# Patient Record
Sex: MALE | Race: WHITE | NOT HISPANIC OR LATINO | Employment: FULL TIME | URBAN - METROPOLITAN AREA
[De-identification: names, ages, dates, MRNs, and addresses within clinical notes are randomized per-mention and may not be internally consistent; named-entity substitution may affect disease eponyms.]

---

## 2019-04-25 ENCOUNTER — HOSPITAL ENCOUNTER (EMERGENCY)
Facility: CLINIC | Age: 54
Discharge: HOME | End: 2019-04-25
Attending: NURSE PRACTITIONER | Admitting: NURSE PRACTITIONER
Payer: COMMERCIAL

## 2019-04-25 VITALS
SYSTOLIC BLOOD PRESSURE: 154 MMHG | HEART RATE: 68 BPM | TEMPERATURE: 99.9 F | RESPIRATION RATE: 20 BRPM | OXYGEN SATURATION: 95 % | DIASTOLIC BLOOD PRESSURE: 82 MMHG

## 2019-04-25 DIAGNOSIS — J18.9 PNEUMONIA OF LEFT LOWER LOBE DUE TO INFECTIOUS ORGANISM: ICD-10-CM

## 2019-04-25 DIAGNOSIS — R11.0 NAUSEA: ICD-10-CM

## 2019-04-25 LAB
ANION GAP SERPL CALCULATED.3IONS-SCNC: 4 MMOL/L (ref 3–14)
BASOPHILS # BLD AUTO: 0 10E9/L (ref 0–0.2)
BASOPHILS NFR BLD AUTO: 0.2 %
BUN SERPL-MCNC: 18 MG/DL (ref 7–30)
CALCIUM SERPL-MCNC: 9.1 MG/DL (ref 8.5–10.1)
CHLORIDE SERPL-SCNC: 102 MMOL/L (ref 94–109)
CO2 BLDCOV-SCNC: 24 MMOL/L (ref 21–28)
CO2 SERPL-SCNC: 28 MMOL/L (ref 20–32)
CREAT SERPL-MCNC: 1.04 MG/DL (ref 0.66–1.25)
DIFFERENTIAL METHOD BLD: ABNORMAL
EOSINOPHIL # BLD AUTO: 0.1 10E9/L (ref 0–0.7)
EOSINOPHIL NFR BLD AUTO: 0.6 %
ERYTHROCYTE [DISTWIDTH] IN BLOOD BY AUTOMATED COUNT: 12.8 % (ref 10–15)
FLUAV+FLUBV AG SPEC QL: NEGATIVE
FLUAV+FLUBV AG SPEC QL: NEGATIVE
GFR SERPL CREATININE-BSD FRML MDRD: 82 ML/MIN/{1.73_M2}
GLUCOSE BLDC GLUCOMTR-MCNC: 124 MG/DL (ref 70–99)
GLUCOSE SERPL-MCNC: 125 MG/DL (ref 70–99)
HCT VFR BLD AUTO: 44.2 % (ref 40–53)
HGB BLD-MCNC: 15.1 G/DL (ref 13.3–17.7)
IMM GRANULOCYTES # BLD: 0 10E9/L (ref 0–0.4)
IMM GRANULOCYTES NFR BLD: 0.2 %
INTERPRETATION ECG - MUSE: NORMAL
LACTATE BLD-SCNC: 1.3 MMOL/L (ref 0.7–2.1)
LYMPHOCYTES # BLD AUTO: 1.2 10E9/L (ref 0.8–5.3)
LYMPHOCYTES NFR BLD AUTO: 9.9 %
MCH RBC QN AUTO: 31 PG (ref 26.5–33)
MCHC RBC AUTO-ENTMCNC: 34.2 G/DL (ref 31.5–36.5)
MCV RBC AUTO: 91 FL (ref 78–100)
MONOCYTES # BLD AUTO: 0.9 10E9/L (ref 0–1.3)
MONOCYTES NFR BLD AUTO: 7.2 %
NEUTROPHILS # BLD AUTO: 10.1 10E9/L (ref 1.6–8.3)
NEUTROPHILS NFR BLD AUTO: 81.9 %
NRBC # BLD AUTO: 0 10*3/UL
NRBC BLD AUTO-RTO: 0 /100
PCO2 BLDV: 40 MM HG (ref 40–50)
PH BLDV: 7.39 PH (ref 7.32–7.43)
PLATELET # BLD AUTO: 283 10E9/L (ref 150–450)
PO2 BLDV: 20 MM HG (ref 25–47)
POTASSIUM SERPL-SCNC: 4.7 MMOL/L (ref 3.4–5.3)
RBC # BLD AUTO: 4.87 10E12/L (ref 4.4–5.9)
SAO2 % BLDV FROM PO2: 32 %
SODIUM SERPL-SCNC: 134 MMOL/L (ref 133–144)
SPECIMEN SOURCE: NORMAL
TROPONIN I BLD-MCNC: 0.01 UG/L (ref 0–0.08)
WBC # BLD AUTO: 12.3 10E9/L (ref 4–11)

## 2019-04-25 PROCEDURE — 94640 AIRWAY INHALATION TREATMENT: CPT

## 2019-04-25 PROCEDURE — 87040 BLOOD CULTURE FOR BACTERIA: CPT | Performed by: EMERGENCY MEDICINE

## 2019-04-25 PROCEDURE — 85025 COMPLETE CBC W/AUTO DIFF WBC: CPT | Performed by: EMERGENCY MEDICINE

## 2019-04-25 PROCEDURE — 80048 BASIC METABOLIC PNL TOTAL CA: CPT | Performed by: EMERGENCY MEDICINE

## 2019-04-25 PROCEDURE — 25000132 ZZH RX MED GY IP 250 OP 250 PS 637: Performed by: NURSE PRACTITIONER

## 2019-04-25 PROCEDURE — 93005 ELECTROCARDIOGRAM TRACING: CPT

## 2019-04-25 PROCEDURE — 93005 ELECTROCARDIOGRAM TRACING: CPT | Mod: 76

## 2019-04-25 PROCEDURE — 25000125 ZZHC RX 250: Performed by: NURSE PRACTITIONER

## 2019-04-25 PROCEDURE — 82803 BLOOD GASES ANY COMBINATION: CPT

## 2019-04-25 PROCEDURE — 25000128 H RX IP 250 OP 636: Performed by: EMERGENCY MEDICINE

## 2019-04-25 PROCEDURE — 96361 HYDRATE IV INFUSION ADD-ON: CPT

## 2019-04-25 PROCEDURE — 00000146 ZZHCL STATISTIC GLUCOSE BY METER IP

## 2019-04-25 PROCEDURE — 25000128 H RX IP 250 OP 636: Performed by: NURSE PRACTITIONER

## 2019-04-25 PROCEDURE — 96376 TX/PRO/DX INJ SAME DRUG ADON: CPT

## 2019-04-25 PROCEDURE — 83605 ASSAY OF LACTIC ACID: CPT

## 2019-04-25 PROCEDURE — 99285 EMERGENCY DEPT VISIT HI MDM: CPT | Mod: 25

## 2019-04-25 PROCEDURE — 84484 ASSAY OF TROPONIN QUANT: CPT

## 2019-04-25 PROCEDURE — 96374 THER/PROPH/DIAG INJ IV PUSH: CPT

## 2019-04-25 PROCEDURE — 87804 INFLUENZA ASSAY W/OPTIC: CPT | Performed by: NURSE PRACTITIONER

## 2019-04-25 RX ORDER — ONDANSETRON 4 MG/1
4 TABLET, ORALLY DISINTEGRATING ORAL EVERY 8 HOURS PRN
Qty: 10 TABLET | Refills: 0 | Status: SHIPPED | OUTPATIENT
Start: 2019-04-25 | End: 2019-04-28

## 2019-04-25 RX ORDER — AZITHROMYCIN 250 MG/1
TABLET, FILM COATED ORAL
Qty: 6 TABLET | Refills: 0 | Status: SHIPPED | OUTPATIENT
Start: 2019-04-25 | End: 2024-05-25

## 2019-04-25 RX ORDER — PREDNISONE 20 MG/1
40 TABLET ORAL DAILY
Qty: 10 TABLET | Refills: 0 | Status: SHIPPED | OUTPATIENT
Start: 2019-04-25 | End: 2019-04-30

## 2019-04-25 RX ORDER — ALBUTEROL SULFATE 90 UG/1
2 AEROSOL, METERED RESPIRATORY (INHALATION) EVERY 4 HOURS PRN
Qty: 8 G | Refills: 0 | Status: SHIPPED | OUTPATIENT
Start: 2019-04-25 | End: 2024-05-25

## 2019-04-25 RX ORDER — AZITHROMYCIN 250 MG/1
500 TABLET, FILM COATED ORAL ONCE
Status: COMPLETED | OUTPATIENT
Start: 2019-04-25 | End: 2019-04-25

## 2019-04-25 RX ORDER — ONDANSETRON 2 MG/ML
4 INJECTION INTRAMUSCULAR; INTRAVENOUS ONCE
Status: COMPLETED | OUTPATIENT
Start: 2019-04-25 | End: 2019-04-25

## 2019-04-25 RX ORDER — IPRATROPIUM BROMIDE AND ALBUTEROL SULFATE 2.5; .5 MG/3ML; MG/3ML
3 SOLUTION RESPIRATORY (INHALATION) ONCE
Status: COMPLETED | OUTPATIENT
Start: 2019-04-25 | End: 2019-04-25

## 2019-04-25 RX ADMIN — IPRATROPIUM BROMIDE AND ALBUTEROL SULFATE 3 ML: .5; 3 SOLUTION RESPIRATORY (INHALATION) at 16:00

## 2019-04-25 RX ADMIN — IPRATROPIUM BROMIDE AND ALBUTEROL SULFATE 3 ML: .5; 3 SOLUTION RESPIRATORY (INHALATION) at 14:21

## 2019-04-25 RX ADMIN — ONDANSETRON 4 MG: 2 INJECTION INTRAMUSCULAR; INTRAVENOUS at 16:00

## 2019-04-25 RX ADMIN — SODIUM CHLORIDE 1000 ML: 9 INJECTION, SOLUTION INTRAVENOUS at 13:51

## 2019-04-25 RX ADMIN — ONDANSETRON 4 MG: 2 INJECTION INTRAMUSCULAR; INTRAVENOUS at 13:38

## 2019-04-25 RX ADMIN — AZITHROMYCIN 500 MG: 250 TABLET, FILM COATED ORAL at 14:21

## 2019-04-25 ASSESSMENT — ENCOUNTER SYMPTOMS
FEVER: 1
COUGH: 1
CONSTIPATION: 0
SHORTNESS OF BREATH: 1
DIARRHEA: 0
NAUSEA: 1
VOMITING: 1
ABDOMINAL PAIN: 1
DYSURIA: 0
WHEEZING: 0
STRIDOR: 0
FREQUENCY: 0

## 2019-04-25 NOTE — ED NOTES
"Pt c/o new \"heartburn\". NP notified. Repeat EKG obtained. Istat troponin run per provider's orders.   "

## 2019-04-25 NOTE — ED AVS SNAPSHOT
[facility] Emergency Department  [address]  [facility] [address]  Phone:  [phone]  Fax:  [phone]                                    Mark Milton Traaseth   MRN: [id number]    Department:  [facility] Emergency Department   Date of Visit:  4/25/2019           After Visit Summary Signature Page    I have received my discharge instructions, and my questions have been answered. I have discussed any challenges I see with this plan with the nurse or doctor.    ..........................................................................................................................................  Patient/Patient Representative Signature      ..........................................................................................................................................  Patient Representative Print Name and Relationship to Patient    ..................................................               ................................................  Date                                   Time    ..........................................................................................................................................  Reviewed by Signature/Title    ...................................................              ..............................................  Date                                               Time          22EPIC Rev 08/18

## 2019-04-25 NOTE — ED PROVIDER NOTES
History     Chief Complaint:  Shortness of Breath    HPI   Mark Milton Traaseth is a 54 year old male who presents with shortness of breath. The patient reports that he has had a cough and congestion for the past 4 days. This morning, he was driving to work when his symptoms acutely worsened. Thus, he presented to urgent care, where he had a measured fever of 102.6F, and had an episode of vomiting. He had a chest xray there, showing a possible bronchitis or left lower lobe pneumonia, as noted below. He was further referred to the ED for evaluation after he was given a dose of Tylenol and Zofran. Here in the ED, the patient ads he has no history of asthma. He does have some generalized abdominal pain, though denies any other symptoms or concerns.    XR Chest 2 views (4/25/19):   IMPRESSION:  1. Findings suspicious for left lower lobe bronchitis and/or infiltrate, as per radiology.     Allergies:  Codeine  Morphine    Medications:    The patient is currently on no regular medications.    Past Medical History:    The patient denies any significant past medical history.    Past Surgical History:    The patient does not have any pertinent past surgical history.    Family History:    No past pertinent family history.    Social History:  Negative for tobacco use.  Negative for alcohol use.  Negative for drug use.    Review of Systems   Constitutional: Positive for fever.   HENT: Positive for congestion.    Respiratory: Positive for cough and shortness of breath. Negative for wheezing and stridor.    Cardiovascular: Negative for chest pain.   Gastrointestinal: Positive for abdominal pain (generalized), nausea and vomiting. Negative for constipation and diarrhea.   Genitourinary: Negative for dysuria, frequency and urgency.   All other systems reviewed and are negative.      Physical Exam     Patient Vitals for the past 24 hrs:   BP Temp Pulse Heart Rate Resp SpO2   04/25/19 1530 154/82 -- 68 66 17 96 %   04/25/19 1515  156/73 -- 69 71 11 96 %   04/25/19 1500 160/86 -- 70 68 17 97 %   04/25/19 1445 158/83 -- 69 69 8 97 %   04/25/19 1430 155/80 -- 68 68 14 98 %   04/25/19 1415 156/79 -- 67 67 -- 96 %   04/25/19 1400 (!) 163/107 -- 69 68 17 93 %   04/25/19 1345 152/83 -- -- -- -- --   04/25/19 1318 (!) 140/92 99.9  F (37.7  C) 76 76 (!) 36 94 %     Physical Exam  General: Alert, Mild  discomfort, well kept  Eyes: PERRL, conjunctivae pink no scleral icterus or conjunctival injection  ENT:   Moist mucus membranes, posterior oropharynx clear without erythema or exudates, No lymphadenopathy, Normal voice  Resp:  Lungs clear to auscultation bilaterally, no crackles/rubs/wheezes. Tachypnea.  CV:  Normal rate and rhythm, no murmurs/rubs/gallops  GI:  Abdomen soft and non-distended.  Normoactive BS.  No tenderness, guarding or rebound, No masses  Skin:  Warm, dry.  No rashes or petechiae  Musculoskeletal: No peripheral edema or calf tenderness, Normal gross ROM   Neuro: Alert and oriented to person/place/time, normal sensation  Psychiatric: Normal affect, cooperative, good eye contact    Emergency Department Course   ECG:  Indication: Shortness of Breath  Time: 1350  Vent. Rate 72 bpm. VT interval 170. QRS duration 106. QT/QTc 398/435. P-R-T axis 41 -39 62.  Sinus rhythm with occasional PVCs. Left axis deviation. Abnormal ECG. Read time: 1350    Indication: Repeat  Time: 1615  Vent. Rate 70 bpm. VT interval 170. QRS duration 106. QT/QTc 414/447. P-R-T axis 40 -36 52.  Normal sinus rhythm. Left axis deviation. Minimal voltage criteria for LVH, may be normal variant. Abnormal ECG. Read time: 1619    Laboratory:  CBC: WBC: 12.3 (H), HGB: 15.1, PLT: 283  BMP: Glucose 125 (H), o/w WNL (Creatinine: 1.04)    1358 Glucose by meter: 124 (H)    1616 Troponin POCT: 0.01    Influenza A/B: negative    Venous Blood Gas  Lab 04/25/19  1404   PHV 7.39   PCO2V 40   Lactic Acid 1.3   PO2V 20*   HCO3V 24     Blood culture:  pending    Interventions:  1338 Zofran, 4 mg, IV injection  1351 NS 1L IV  1421 Azithromycin 500 mg PO   Duoneb 3 mL Nebulization   1600 Zofran, 4 mg, IV injection   Duoneb 3 mL Nebulization     Emergency Department Course:  Nursing notes and vitals reviewed. (1346) I performed an exam of the patient as documented above.     IV inserted. Medicine administered as documented above. Blood drawn. This was sent to the lab for further testing, results above.    EKG obtained in the ED, see results above.     (0350) I rechecked the patient and discussed the results of his workup thus far.     Findings and plan explained to the Patient. Patient discharged home with instructions regarding supportive care, medications, and reasons to return. The importance of close follow-up was reviewed. The patient was prescribed Azithromycin, albuterol, prednisone, and Zofran.    I personally reviewed the laboratory results with the Patient and answered all related questions prior to discharge.     Impression & Plan      Medical Decision Making:  Mark Milton Traaseth is a 54 year old male who presents today for evaluation of shortness of breath and upper respiratory symptoms.  He was seen at clinic prior to arrival where he had a chest x-ray that was concerning for bronchitis versus pneumonia.  Given that this is one-sided and patient has been febrile this most likely does not represent pneumonia.  His evaluation here shows a negative influenza, a negative troponin, nonacute EKG is, and minimally elevated white blood cell count.  Remainder of his laboratory studies are noncontributory.  He does appear to be improved and states that he feels somewhat improved after the above treatment.  He has not been hypoxic.  He does not require supplemental oxygen.  At this point there is no indication for further testing or imaging.  He is given his first dose of antibiotics here.  He was given a prescription for antibiotics and symptomatic medication  for at home.  At this point time he does appear to be safe and appropriate for outpatient management and follow-up and is discharged home.      Diagnosis:    ICD-10-CM    1. Pneumonia of left lower lobe due to infectious organism (H) J18.1 Blood culture ONE site   2. Nausea R11.0      Disposition:  discharged to home    Discharge Medications:     Medication List      Started    albuterol 108 (90 Base) MCG/ACT inhaler  Commonly known as:  PROAIR HFA  2 puffs, Inhalation, EVERY 4 HOURS PRN     azithromycin 250 MG tablet  Commonly known as:  ZITHROMAX  2 tabs day one, 1 tab days 2-5     ondansetron 4 MG ODT tab  Commonly known as:  ZOFRAN ODT  4 mg, Oral, EVERY 8 HOURS PRN     predniSONE 20 MG tablet  Commonly known as:  DELTASONE  40 mg, Oral, DAILY          Scribe Disclosure:  Winter SORIANO, am serving as a scribe on 4/25/2019 at 2:12 PM to personally document services performed by Jluis Haile APRN* based on my observations and the provider's statements to me.     Winter Ruth  4/25/2019   [facility] EMERGENCY DEPARTMENT       Jluis Haile APRN CNP  04/25/19 4775

## 2019-04-25 NOTE — ED TRIAGE NOTES
Pt presents with pneumonia from the clinic, pt states he began feeling ill today. Pt alert, oriented x3 ABCs intact

## 2019-04-26 LAB — INTERPRETATION ECG - MUSE: NORMAL

## 2019-05-01 LAB
BACTERIA SPEC CULT: NO GROWTH
Lab: NORMAL
SPECIMEN SOURCE: NORMAL

## 2021-12-08 ENCOUNTER — HOME INFUSION (PRE-WILLOW HOME INFUSION) (OUTPATIENT)
Dept: PHARMACY | Facility: CLINIC | Age: 56
End: 2021-12-08
Payer: COMMERCIAL

## 2021-12-10 NOTE — PROGRESS NOTES
This is a recent snapshot of the patient's [facility] medical record.  For current drug dose and complete information and questions, call [phone] or In Basket pool, fv home infusion ([id number])  CSN Number:  [id number]

## 2021-12-15 ENCOUNTER — HOME INFUSION (PRE-WILLOW HOME INFUSION) (OUTPATIENT)
Dept: PHARMACY | Facility: CLINIC | Age: 56
End: 2021-12-15
Payer: COMMERCIAL

## 2021-12-20 ENCOUNTER — HOME INFUSION (PRE-WILLOW HOME INFUSION) (OUTPATIENT)
Dept: PHARMACY | Facility: CLINIC | Age: 56
End: 2021-12-20
Payer: COMMERCIAL

## 2021-12-24 ENCOUNTER — HOME INFUSION (PRE-WILLOW HOME INFUSION) (OUTPATIENT)
Dept: PHARMACY | Facility: CLINIC | Age: 56
End: 2021-12-24
Payer: COMMERCIAL

## 2021-12-28 ENCOUNTER — HOME INFUSION (PRE-WILLOW HOME INFUSION) (OUTPATIENT)
Dept: PHARMACY | Facility: CLINIC | Age: 56
End: 2021-12-28
Payer: COMMERCIAL

## 2022-01-02 ENCOUNTER — HOME INFUSION (PRE-WILLOW HOME INFUSION) (OUTPATIENT)
Dept: PHARMACY | Facility: CLINIC | Age: 57
End: 2022-01-02
Payer: COMMERCIAL

## 2022-01-03 ENCOUNTER — HOME INFUSION (PRE-WILLOW HOME INFUSION) (OUTPATIENT)
Dept: PHARMACY | Facility: CLINIC | Age: 57
End: 2022-01-03
Payer: COMMERCIAL

## 2022-01-06 ENCOUNTER — HOME INFUSION (PRE-WILLOW HOME INFUSION) (OUTPATIENT)
Dept: PHARMACY | Facility: CLINIC | Age: 57
End: 2022-01-06
Payer: COMMERCIAL

## 2022-01-07 ENCOUNTER — HOME INFUSION (PRE-WILLOW HOME INFUSION) (OUTPATIENT)
Dept: PHARMACY | Facility: CLINIC | Age: 57
End: 2022-01-07
Payer: COMMERCIAL

## 2022-01-10 ENCOUNTER — HOME INFUSION (PRE-WILLOW HOME INFUSION) (OUTPATIENT)
Dept: PHARMACY | Facility: CLINIC | Age: 57
End: 2022-01-10
Payer: COMMERCIAL

## 2022-01-11 ENCOUNTER — HOME INFUSION (PRE-WILLOW HOME INFUSION) (OUTPATIENT)
Dept: PHARMACY | Facility: CLINIC | Age: 57
End: 2022-01-11
Payer: COMMERCIAL

## 2022-01-12 ENCOUNTER — HOME INFUSION (PRE-WILLOW HOME INFUSION) (OUTPATIENT)
Dept: PHARMACY | Facility: CLINIC | Age: 57
End: 2022-01-12
Payer: COMMERCIAL

## 2022-01-12 NOTE — PROGRESS NOTES
This is a recent snapshot of the patient's [facility] medical record.  For current drug dose and complete information and questions, call [phone] or In Basket pool, fv home infusion ([id number])  CSN Number:  [id number]

## 2022-02-10 NOTE — PROGRESS NOTES
This is a recent snapshot of the patient's [facility] medical record.  For current drug dose and complete information and questions, call [phone] or In Basket pool, fv home infusion ([id number])  CSN Number:  [id number]

## 2022-03-10 NOTE — PROGRESS NOTES
This is a recent snapshot of the patient's [facility] medical record.  For current drug dose and complete information and questions, call [phone] or In Basket pool, fv home infusion ([id number])  CSN Number:  [id number]

## 2022-03-22 NOTE — PROGRESS NOTES
This is a recent snapshot of the patient's [facility] medical record.  For current drug dose and complete information and questions, call [phone] or In Basket pool, fv home infusion ([id number])  CSN Number:  [id number]

## 2022-04-19 NOTE — PROGRESS NOTES
This is a recent snapshot of the patient's [facility] medical record.  For current drug dose and complete information and questions, call [phone] or In [facility] pool, fv home infusion ([id number])  CSN Number:  [id number]

## 2022-04-22 NOTE — PROGRESS NOTES
This is a recent snapshot of the patient's [facility] medical record.  For current drug dose and complete information and questions, call [phone] or In Basket pool, fv home infusion ([id number])  CSN Number:  [id number]

## 2022-04-29 NOTE — PROGRESS NOTES
This is a recent snapshot of the patient's [facility] medical record.  For current drug dose and complete information and questions, call [phone] or In Basket pool, fv home infusion ([phone])  CSN Number:  [id number]

## 2022-04-29 NOTE — PROGRESS NOTES
This is a recent snapshot of the patient's [facility] medical record.  For current drug dose and complete information and questions, call [phone] or In Basket pool, fv home infusion ([id number])  CSN Number:  [id number]

## 2022-05-05 NOTE — PROGRESS NOTES
This is a recent snapshot of the patient's [facility] medical record.  For current drug dose and complete information and questions, call [phone] or In Basket pool, fv home infusion ([id number])  CSN Number:  [id number]

## 2022-05-11 NOTE — PROGRESS NOTES
This is a recent snapshot of the patient's [facility] medical record.  For current drug dose and complete information and questions, call [phone] or In [facility] pool, fv home infusion ([phone])  CSN Number:  [id number]

## 2022-06-16 NOTE — PROGRESS NOTES
This is a recent snapshot of the patient's [facility] medical record.  For current drug dose and complete information and questions, call [phone] or In Basket pool, fv home infusion ([id number])  CSN Number:  [id number]

## 2022-06-21 NOTE — PROGRESS NOTES
This is a recent snapshot of the patient's [facility] medical record.  For current drug dose and complete information and questions, call [phone] or In Basket pool, fv home infusion ([id number])  CSN Number:  [id number]

## 2022-12-13 ENCOUNTER — HOME INFUSION (PRE-WILLOW HOME INFUSION) (OUTPATIENT)
Dept: PHARMACY | Facility: CLINIC | Age: 57
End: 2022-12-13
Payer: COMMERCIAL

## 2024-05-25 ENCOUNTER — HOSPITAL ENCOUNTER (INPATIENT)
Facility: CLINIC | Age: 59
LOS: 3 days | Discharge: HOME | DRG: 552 | End: 2024-05-30
Attending: EMERGENCY MEDICINE | Admitting: EMERGENCY MEDICINE
Payer: COMMERCIAL

## 2024-05-25 ENCOUNTER — APPOINTMENT (OUTPATIENT)
Dept: MRI IMAGING | Facility: CLINIC | Age: 59
DRG: 552 | End: 2024-05-25
Attending: EMERGENCY MEDICINE
Payer: COMMERCIAL

## 2024-05-25 DIAGNOSIS — N40.1 BENIGN PROSTATIC HYPERPLASIA WITH URINARY RETENTION: ICD-10-CM

## 2024-05-25 DIAGNOSIS — R33.8 BENIGN PROSTATIC HYPERPLASIA WITH URINARY RETENTION: ICD-10-CM

## 2024-05-25 DIAGNOSIS — E11.69 TYPE 2 DIABETES MELLITUS WITH OTHER SPECIFIED COMPLICATION, WITHOUT LONG-TERM CURRENT USE OF INSULIN (H): ICD-10-CM

## 2024-05-25 DIAGNOSIS — I10 BENIGN ESSENTIAL HYPERTENSION: Primary | ICD-10-CM

## 2024-05-25 DIAGNOSIS — M54.59 INTRACTABLE LOW BACK PAIN: ICD-10-CM

## 2024-05-25 LAB
ALBUMIN UR-MCNC: 70 MG/DL
ANION GAP SERPL CALCULATED.3IONS-SCNC: 10 MMOL/L (ref 7–15)
APPEARANCE UR: CLEAR
BASOPHILS # BLD AUTO: 0 10E3/UL (ref 0–0.2)
BASOPHILS NFR BLD AUTO: 0 %
BILIRUB UR QL STRIP: NEGATIVE
BUN SERPL-MCNC: 23.7 MG/DL (ref 6–20)
CALCIUM SERPL-MCNC: 9.2 MG/DL (ref 8.6–10)
CHLORIDE SERPL-SCNC: 106 MMOL/L (ref 98–107)
COLOR UR AUTO: ABNORMAL
CREAT SERPL-MCNC: 1.4 MG/DL (ref 0.67–1.17)
DEPRECATED HCO3 PLAS-SCNC: 23 MMOL/L (ref 22–29)
EGFRCR SERPLBLD CKD-EPI 2021: 59 ML/MIN/1.73M2
EOSINOPHIL # BLD AUTO: 0.2 10E3/UL (ref 0–0.7)
EOSINOPHIL NFR BLD AUTO: 2 %
ERYTHROCYTE [DISTWIDTH] IN BLOOD BY AUTOMATED COUNT: 14.3 % (ref 10–15)
GLUCOSE BLDC GLUCOMTR-MCNC: 205 MG/DL (ref 70–99)
GLUCOSE BLDC GLUCOMTR-MCNC: 215 MG/DL (ref 70–99)
GLUCOSE SERPL-MCNC: 182 MG/DL (ref 70–99)
GLUCOSE UR STRIP-MCNC: NEGATIVE MG/DL
HBA1C MFR BLD: 7.1 %
HCT VFR BLD AUTO: 43.8 % (ref 40–53)
HGB BLD-MCNC: 14.6 G/DL (ref 13.3–17.7)
HGB UR QL STRIP: NEGATIVE
IMM GRANULOCYTES # BLD: 0 10E3/UL
IMM GRANULOCYTES NFR BLD: 0 %
KETONES UR STRIP-MCNC: NEGATIVE MG/DL
LEUKOCYTE ESTERASE UR QL STRIP: NEGATIVE
LYMPHOCYTES # BLD AUTO: 2.1 10E3/UL (ref 0.8–5.3)
LYMPHOCYTES NFR BLD AUTO: 24 %
MCH RBC QN AUTO: 29 PG (ref 26.5–33)
MCHC RBC AUTO-ENTMCNC: 33.3 G/DL (ref 31.5–36.5)
MCV RBC AUTO: 87 FL (ref 78–100)
MONOCYTES # BLD AUTO: 0.6 10E3/UL (ref 0–1.3)
MONOCYTES NFR BLD AUTO: 7 %
NEUTROPHILS # BLD AUTO: 5.7 10E3/UL (ref 1.6–8.3)
NEUTROPHILS NFR BLD AUTO: 66 %
NITRATE UR QL: NEGATIVE
NRBC # BLD AUTO: 0 10E3/UL
NRBC BLD AUTO-RTO: 0 /100
PH UR STRIP: 7.5 [PH] (ref 5–7)
PLATELET # BLD AUTO: 349 10E3/UL (ref 150–450)
POTASSIUM SERPL-SCNC: 5.1 MMOL/L (ref 3.4–5.3)
RBC # BLD AUTO: 5.03 10E6/UL (ref 4.4–5.9)
RBC URINE: 1 /HPF
SODIUM SERPL-SCNC: 139 MMOL/L (ref 135–145)
SP GR UR STRIP: 1.01 (ref 1–1.03)
SQUAMOUS EPITHELIAL: 1 /HPF
UROBILINOGEN UR STRIP-MCNC: <2 MG/DL
WBC # BLD AUTO: 8.5 10E3/UL (ref 4–11)
WBC URINE: 1 /HPF

## 2024-05-25 PROCEDURE — 81001 URINALYSIS AUTO W/SCOPE: CPT | Performed by: EMERGENCY MEDICINE

## 2024-05-25 PROCEDURE — 99285 EMERGENCY DEPT VISIT HI MDM: CPT | Mod: 25

## 2024-05-25 PROCEDURE — 96376 TX/PRO/DX INJ SAME DRUG ADON: CPT

## 2024-05-25 PROCEDURE — 250N000012 HC RX MED GY IP 250 OP 636 PS 637: Performed by: EMERGENCY MEDICINE

## 2024-05-25 PROCEDURE — 250N000011 HC RX IP 250 OP 636: Performed by: EMERGENCY MEDICINE

## 2024-05-25 PROCEDURE — G0378 HOSPITAL OBSERVATION PER HR: HCPCS

## 2024-05-25 PROCEDURE — 72148 MRI LUMBAR SPINE W/O DYE: CPT

## 2024-05-25 PROCEDURE — 250N000013 HC RX MED GY IP 250 OP 250 PS 637: Performed by: INTERNAL MEDICINE

## 2024-05-25 PROCEDURE — 99207 PR NO BILLABLE SERVICE THIS VISIT: CPT | Performed by: EMERGENCY MEDICINE

## 2024-05-25 PROCEDURE — 250N000013 HC RX MED GY IP 250 OP 250 PS 637: Performed by: EMERGENCY MEDICINE

## 2024-05-25 PROCEDURE — 83036 HEMOGLOBIN GLYCOSYLATED A1C: CPT | Performed by: EMERGENCY MEDICINE

## 2024-05-25 PROCEDURE — 82962 GLUCOSE BLOOD TEST: CPT

## 2024-05-25 PROCEDURE — 85025 COMPLETE CBC W/AUTO DIFF WBC: CPT | Performed by: EMERGENCY MEDICINE

## 2024-05-25 PROCEDURE — 96375 TX/PRO/DX INJ NEW DRUG ADDON: CPT

## 2024-05-25 PROCEDURE — 80048 BASIC METABOLIC PNL TOTAL CA: CPT | Performed by: EMERGENCY MEDICINE

## 2024-05-25 PROCEDURE — 250N000012 HC RX MED GY IP 250 OP 636 PS 637: Performed by: INTERNAL MEDICINE

## 2024-05-25 PROCEDURE — 99222 1ST HOSP IP/OBS MODERATE 55: CPT | Performed by: EMERGENCY MEDICINE

## 2024-05-25 PROCEDURE — 96374 THER/PROPH/DIAG INJ IV PUSH: CPT

## 2024-05-25 PROCEDURE — 36415 COLL VENOUS BLD VENIPUNCTURE: CPT | Performed by: EMERGENCY MEDICINE

## 2024-05-25 RX ORDER — LOSARTAN POTASSIUM 100 MG/1
100 TABLET ORAL DAILY
Status: ON HOLD | COMMUNITY
End: 2024-05-30

## 2024-05-25 RX ORDER — HYDROXYZINE HYDROCHLORIDE 10 MG/1
10-20 TABLET, FILM COATED ORAL 2 TIMES DAILY PRN
Status: ON HOLD | COMMUNITY
End: 2024-07-11

## 2024-05-25 RX ORDER — HYDROXYZINE HYDROCHLORIDE 25 MG/1
25 TABLET, FILM COATED ORAL EVERY 4 HOURS PRN
Status: DISCONTINUED | OUTPATIENT
Start: 2024-05-25 | End: 2024-05-30 | Stop reason: HOSPADM

## 2024-05-25 RX ORDER — AMPICILLIN TRIHYDRATE 250 MG
600 CAPSULE ORAL 2 TIMES DAILY
COMMUNITY

## 2024-05-25 RX ORDER — ASPIRIN 81 MG/1
81 TABLET ORAL DAILY
Status: DISCONTINUED | OUTPATIENT
Start: 2024-05-26 | End: 2024-05-30 | Stop reason: HOSPADM

## 2024-05-25 RX ORDER — BUPROPION HYDROCHLORIDE 300 MG/1
300 TABLET ORAL EVERY MORNING
COMMUNITY

## 2024-05-25 RX ORDER — DEXTROSE MONOHYDRATE 25 G/50ML
25-50 INJECTION, SOLUTION INTRAVENOUS
Status: DISCONTINUED | OUTPATIENT
Start: 2024-05-25 | End: 2024-05-30 | Stop reason: HOSPADM

## 2024-05-25 RX ORDER — AMOXICILLIN 250 MG
1 CAPSULE ORAL 2 TIMES DAILY PRN
Status: DISCONTINUED | OUTPATIENT
Start: 2024-05-25 | End: 2024-05-30 | Stop reason: HOSPADM

## 2024-05-25 RX ORDER — LEVOTHYROXINE SODIUM 50 UG/1
50 TABLET ORAL DAILY
COMMUNITY

## 2024-05-25 RX ORDER — ONDANSETRON 2 MG/ML
4 INJECTION INTRAMUSCULAR; INTRAVENOUS EVERY 6 HOURS PRN
Status: DISCONTINUED | OUTPATIENT
Start: 2024-05-25 | End: 2024-05-30 | Stop reason: HOSPADM

## 2024-05-25 RX ORDER — NICOTINE POLACRILEX 4 MG
15-30 LOZENGE BUCCAL
Status: DISCONTINUED | OUTPATIENT
Start: 2024-05-25 | End: 2024-05-30 | Stop reason: HOSPADM

## 2024-05-25 RX ORDER — DIAZEPAM 10 MG/2ML
2.5 INJECTION, SOLUTION INTRAMUSCULAR; INTRAVENOUS 3 TIMES DAILY
Status: DISCONTINUED | OUTPATIENT
Start: 2024-05-25 | End: 2024-05-26

## 2024-05-25 RX ORDER — DIAZEPAM 10 MG/2ML
2.5 INJECTION, SOLUTION INTRAMUSCULAR; INTRAVENOUS ONCE
Status: COMPLETED | OUTPATIENT
Start: 2024-05-25 | End: 2024-05-25

## 2024-05-25 RX ORDER — HYDROMORPHONE HYDROCHLORIDE 1 MG/ML
0.5 INJECTION, SOLUTION INTRAMUSCULAR; INTRAVENOUS; SUBCUTANEOUS ONCE
Status: COMPLETED | OUTPATIENT
Start: 2024-05-25 | End: 2024-05-25

## 2024-05-25 RX ORDER — NALOXONE HYDROCHLORIDE 0.4 MG/ML
0.2 INJECTION, SOLUTION INTRAMUSCULAR; INTRAVENOUS; SUBCUTANEOUS
Status: DISCONTINUED | OUTPATIENT
Start: 2024-05-25 | End: 2024-05-30 | Stop reason: HOSPADM

## 2024-05-25 RX ORDER — ALLOPURINOL 100 MG/1
100 TABLET ORAL DAILY PRN
COMMUNITY

## 2024-05-25 RX ORDER — HYDRALAZINE HYDROCHLORIDE 100 MG/1
100 TABLET, FILM COATED ORAL 2 TIMES DAILY
Status: ON HOLD | COMMUNITY
End: 2024-05-30

## 2024-05-25 RX ORDER — ACETAMINOPHEN 325 MG/1
975 TABLET ORAL 3 TIMES DAILY
Status: DISCONTINUED | OUTPATIENT
Start: 2024-05-25 | End: 2024-05-27

## 2024-05-25 RX ORDER — VALACYCLOVIR HYDROCHLORIDE 1 G/1
1000 TABLET, FILM COATED ORAL 2 TIMES DAILY PRN
COMMUNITY

## 2024-05-25 RX ORDER — NITROGLYCERIN 0.4 MG/1
0.4 TABLET SUBLINGUAL EVERY 5 MIN PRN
COMMUNITY

## 2024-05-25 RX ORDER — NALOXONE HYDROCHLORIDE 0.4 MG/ML
0.4 INJECTION, SOLUTION INTRAMUSCULAR; INTRAVENOUS; SUBCUTANEOUS
Status: DISCONTINUED | OUTPATIENT
Start: 2024-05-25 | End: 2024-05-30 | Stop reason: HOSPADM

## 2024-05-25 RX ORDER — ASPIRIN 81 MG/1
81 TABLET ORAL DAILY
Status: ON HOLD | COMMUNITY
End: 2024-07-11

## 2024-05-25 RX ORDER — TORSEMIDE 20 MG/1
40 TABLET ORAL DAILY
COMMUNITY

## 2024-05-25 RX ORDER — SILDENAFIL CITRATE 20 MG/1
100 TABLET ORAL DAILY PRN
COMMUNITY

## 2024-05-25 RX ORDER — ONDANSETRON 4 MG/1
4 TABLET, ORALLY DISINTEGRATING ORAL EVERY 6 HOURS PRN
Status: DISCONTINUED | OUTPATIENT
Start: 2024-05-25 | End: 2024-05-30 | Stop reason: HOSPADM

## 2024-05-25 RX ORDER — ALLOPURINOL 100 MG/1
100 TABLET ORAL DAILY
Status: DISCONTINUED | OUTPATIENT
Start: 2024-05-25 | End: 2024-05-30 | Stop reason: HOSPADM

## 2024-05-25 RX ORDER — INSULIN LISPRO 100 [IU]/ML
0-12 INJECTION, SOLUTION INTRAVENOUS; SUBCUTANEOUS
COMMUNITY

## 2024-05-25 RX ORDER — PYRIDOXINE HCL (VITAMIN B6) 50 MG
50 TABLET ORAL DAILY
COMMUNITY

## 2024-05-25 RX ORDER — AMLODIPINE BESYLATE 10 MG/1
10 TABLET ORAL DAILY
Status: DISCONTINUED | OUTPATIENT
Start: 2024-05-26 | End: 2024-05-30 | Stop reason: HOSPADM

## 2024-05-25 RX ORDER — CARVEDILOL 25 MG/1
25 TABLET ORAL 2 TIMES DAILY WITH MEALS
Status: ON HOLD | COMMUNITY
End: 2024-05-30

## 2024-05-25 RX ORDER — ONDANSETRON 2 MG/ML
4 INJECTION INTRAMUSCULAR; INTRAVENOUS ONCE
Status: COMPLETED | OUTPATIENT
Start: 2024-05-25 | End: 2024-05-25

## 2024-05-25 RX ORDER — PANTOPRAZOLE SODIUM 40 MG/1
40 TABLET, DELAYED RELEASE ORAL DAILY
Status: DISCONTINUED | OUTPATIENT
Start: 2024-05-26 | End: 2024-05-30 | Stop reason: HOSPADM

## 2024-05-25 RX ORDER — HYDROMORPHONE HYDROCHLORIDE 1 MG/ML
.5-1 INJECTION, SOLUTION INTRAMUSCULAR; INTRAVENOUS; SUBCUTANEOUS
Status: DISCONTINUED | OUTPATIENT
Start: 2024-05-25 | End: 2024-05-28 | Stop reason: ALTCHOICE

## 2024-05-25 RX ORDER — DIPHENHYDRAMINE HCL 25 MG
25 CAPSULE ORAL EVERY 6 HOURS PRN
Status: DISCONTINUED | OUTPATIENT
Start: 2024-05-25 | End: 2024-05-30 | Stop reason: HOSPADM

## 2024-05-25 RX ORDER — DIPHENHYDRAMINE HYDROCHLORIDE 50 MG/ML
25 INJECTION INTRAMUSCULAR; INTRAVENOUS EVERY 6 HOURS PRN
Status: DISCONTINUED | OUTPATIENT
Start: 2024-05-25 | End: 2024-05-30 | Stop reason: HOSPADM

## 2024-05-25 RX ORDER — AMLODIPINE BESYLATE 10 MG/1
10 TABLET ORAL DAILY
COMMUNITY

## 2024-05-25 RX ORDER — DEXAMETHASONE SODIUM PHOSPHATE 4 MG/ML
4 INJECTION, SOLUTION INTRA-ARTICULAR; INTRALESIONAL; INTRAMUSCULAR; INTRAVENOUS; SOFT TISSUE EVERY 6 HOURS
Status: DISCONTINUED | OUTPATIENT
Start: 2024-05-25 | End: 2024-05-28

## 2024-05-25 RX ORDER — AMOXICILLIN 250 MG
2 CAPSULE ORAL 2 TIMES DAILY PRN
Status: DISCONTINUED | OUTPATIENT
Start: 2024-05-25 | End: 2024-05-30 | Stop reason: HOSPADM

## 2024-05-25 RX ORDER — DEXAMETHASONE SODIUM PHOSPHATE 10 MG/ML
8 INJECTION, SOLUTION INTRAMUSCULAR; INTRAVENOUS ONCE
Status: COMPLETED | OUTPATIENT
Start: 2024-05-25 | End: 2024-05-25

## 2024-05-25 RX ORDER — BUPROPION HYDROCHLORIDE 150 MG/1
300 TABLET ORAL EVERY MORNING
Status: DISCONTINUED | OUTPATIENT
Start: 2024-05-26 | End: 2024-05-30 | Stop reason: HOSPADM

## 2024-05-25 RX ORDER — CARVEDILOL 25 MG/1
25 TABLET ORAL DAILY
Status: DISCONTINUED | OUTPATIENT
Start: 2024-05-26 | End: 2024-05-26

## 2024-05-25 RX ORDER — LEVOTHYROXINE SODIUM 50 UG/1
50 TABLET ORAL DAILY
Status: DISCONTINUED | OUTPATIENT
Start: 2024-05-26 | End: 2024-05-30 | Stop reason: HOSPADM

## 2024-05-25 RX ORDER — HYDRALAZINE HYDROCHLORIDE 50 MG/1
100 TABLET, FILM COATED ORAL 2 TIMES DAILY
Status: DISCONTINUED | OUTPATIENT
Start: 2024-05-25 | End: 2024-05-27

## 2024-05-25 RX ORDER — LOSARTAN POTASSIUM 50 MG/1
50 TABLET ORAL DAILY
Status: DISCONTINUED | OUTPATIENT
Start: 2024-05-26 | End: 2024-05-30 | Stop reason: HOSPADM

## 2024-05-25 RX ORDER — CARVEDILOL 25 MG/1
25 TABLET ORAL 2 TIMES DAILY WITH MEALS
Status: DISCONTINUED | OUTPATIENT
Start: 2024-05-26 | End: 2024-05-25

## 2024-05-25 RX ADMIN — ALLOPURINOL 100 MG: 100 TABLET ORAL at 14:54

## 2024-05-25 RX ADMIN — DIAZEPAM 2.5 MG: 10 INJECTION, SOLUTION INTRAMUSCULAR; INTRAVENOUS at 13:39

## 2024-05-25 RX ADMIN — INSULIN GLARGINE 100 UNITS: 100 INJECTION, SOLUTION SUBCUTANEOUS at 23:48

## 2024-05-25 RX ADMIN — DIAZEPAM 2.5 MG: 5 INJECTION INTRAMUSCULAR; INTRAVENOUS at 09:17

## 2024-05-25 RX ADMIN — SENNOSIDES AND DOCUSATE SODIUM 1 TABLET: 50; 8.6 TABLET ORAL at 14:54

## 2024-05-25 RX ADMIN — ACETAMINOPHEN 975 MG: 325 TABLET ORAL at 13:40

## 2024-05-25 RX ADMIN — HYDROMORPHONE HYDROCHLORIDE 1 MG: 1 INJECTION, SOLUTION INTRAMUSCULAR; INTRAVENOUS; SUBCUTANEOUS at 09:15

## 2024-05-25 RX ADMIN — HYDROMORPHONE HYDROCHLORIDE 1 MG: 1 INJECTION, SOLUTION INTRAMUSCULAR; INTRAVENOUS; SUBCUTANEOUS at 10:51

## 2024-05-25 RX ADMIN — DEXAMETHASONE SODIUM PHOSPHATE 4 MG: 4 INJECTION, SOLUTION INTRAMUSCULAR; INTRAVENOUS at 23:48

## 2024-05-25 RX ADMIN — HYDROMORPHONE HYDROCHLORIDE 0.5 MG: 1 INJECTION, SOLUTION INTRAMUSCULAR; INTRAVENOUS; SUBCUTANEOUS at 13:39

## 2024-05-25 RX ADMIN — HYDROMORPHONE HYDROCHLORIDE 1 MG: 1 INJECTION, SOLUTION INTRAMUSCULAR; INTRAVENOUS; SUBCUTANEOUS at 15:04

## 2024-05-25 RX ADMIN — HYDRALAZINE HYDROCHLORIDE 100 MG: 50 TABLET, FILM COATED ORAL at 23:48

## 2024-05-25 RX ADMIN — INSULIN ASPART 2 UNITS: 100 INJECTION, SOLUTION INTRAVENOUS; SUBCUTANEOUS at 16:25

## 2024-05-25 RX ADMIN — DIPHENHYDRAMINE HYDROCHLORIDE 25 MG: 25 CAPSULE ORAL at 14:54

## 2024-05-25 RX ADMIN — ACETAMINOPHEN 975 MG: 325 TABLET ORAL at 20:24

## 2024-05-25 RX ADMIN — DIAZEPAM 2.5 MG: 10 INJECTION, SOLUTION INTRAMUSCULAR; INTRAVENOUS at 20:24

## 2024-05-25 RX ADMIN — DIPHENHYDRAMINE HYDROCHLORIDE 25 MG: 25 CAPSULE ORAL at 20:42

## 2024-05-25 RX ADMIN — HYDROMORPHONE HYDROCHLORIDE 1 MG: 1 INJECTION, SOLUTION INTRAMUSCULAR; INTRAVENOUS; SUBCUTANEOUS at 22:14

## 2024-05-25 RX ADMIN — DEXAMETHASONE SODIUM PHOSPHATE 4 MG: 4 INJECTION, SOLUTION INTRAMUSCULAR; INTRAVENOUS at 17:06

## 2024-05-25 RX ADMIN — DEXAMETHASONE SODIUM PHOSPHATE 8 MG: 10 INJECTION INTRAMUSCULAR; INTRAVENOUS at 12:11

## 2024-05-25 RX ADMIN — HYDROMORPHONE HYDROCHLORIDE 1 MG: 1 INJECTION, SOLUTION INTRAMUSCULAR; INTRAVENOUS; SUBCUTANEOUS at 19:10

## 2024-05-25 RX ADMIN — HYDROXYZINE HYDROCHLORIDE 25 MG: 25 TABLET ORAL at 17:51

## 2024-05-25 RX ADMIN — HYDROMORPHONE HYDROCHLORIDE 1 MG: 1 INJECTION, SOLUTION INTRAMUSCULAR; INTRAVENOUS; SUBCUTANEOUS at 17:06

## 2024-05-25 RX ADMIN — ONDANSETRON 4 MG: 2 INJECTION INTRAMUSCULAR; INTRAVENOUS at 09:12

## 2024-05-25 ASSESSMENT — ACTIVITIES OF DAILY LIVING (ADL)
ADLS_ACUITY_SCORE: 36
ADLS_ACUITY_SCORE: 37
ADLS_ACUITY_SCORE: 36
ADLS_ACUITY_SCORE: 37
ADLS_ACUITY_SCORE: 36
ADLS_ACUITY_SCORE: 35
ADLS_ACUITY_SCORE: 35
ADLS_ACUITY_SCORE: 36
ADLS_ACUITY_SCORE: 36
ADLS_ACUITY_SCORE: 35
ADLS_ACUITY_SCORE: 36
ADLS_ACUITY_SCORE: 36
ADLS_ACUITY_SCORE: 35

## 2024-05-25 ASSESSMENT — COLUMBIA-SUICIDE SEVERITY RATING SCALE - C-SSRS
6. HAVE YOU EVER DONE ANYTHING, STARTED TO DO ANYTHING, OR PREPARED TO DO ANYTHING TO END YOUR LIFE?: NO
2. HAVE YOU ACTUALLY HAD ANY THOUGHTS OF KILLING YOURSELF IN THE PAST MONTH?: NO
1. IN THE PAST MONTH, HAVE YOU WISHED YOU WERE DEAD OR WISHED YOU COULD GO TO SLEEP AND NOT WAKE UP?: NO

## 2024-05-25 NOTE — ED TRIAGE NOTES
"Pt presents to the ER via EMS from private home. Pt here with complaints of low back pain, L hip and leg pain. Pt unable to straighten his L leg. States feels like \"it might pop\". Pt unable to walk. Denies any trauma. Worsening numbness down L leg. Hx of L4 discectomy. Report similar pain to when that happened. EMS gave 100 mcg of Fentanyl with minimal relief.     Triage Assessment (Adult)       Row Name 05/25/24 0834          Triage Assessment    Airway WDL WDL        Respiratory WDL    Respiratory WDL WDL        Skin Circulation/Temperature WDL    Skin Circulation/Temperature WDL WDL        Cardiac WDL    Cardiac WDL WDL        Peripheral/Neurovascular WDL    Peripheral Neurovascular WDL WDL        Cognitive/Neuro/Behavioral WDL    Cognitive/Neuro/Behavioral WDL WDL                     "

## 2024-05-25 NOTE — ED PROVIDER NOTES
EMERGENCY DEPARTMENT ENCOUNTER      NAME: Mark Milton Traaseth  AGE: 59 year old male  YOB: 1965  MRN: [id number]  EVALUATION DATE & TIME: 2024  8:27 AM    PCP: True Nogueira    ED PROVIDER: Kevan Vivar M.D.      Chief Complaint   Patient presents with    Back Pain         FINAL IMPRESSION:  1.  Acute lumbar spine pain.  2.  Acute urinary retention.  3.  Intractable pain.  4.  Inability to stand or ambulate.    ED COURSE & MEDICAL DECISION MAKIN:50 AM.  I met with the patient to gather history and to perform my initial exam. We discussed plans for the ED course, including diagnostic testing and treatment. PPE worn: cloth mask.  Patient status post L4 discectomy a number of years ago.  Notes onset of pain since yesterday in the low back with primarily going down the left leg and some posterior right leg.  Does not feel like he is emptying his bladder appropriately.  Denies any other neurological deficits.  9:19 AM.  Bladder scan showed 882 cc.  Patient voided approximately 100 mL of urine.  Postvoid residual 776.  A Springer catheter was ordered.  11:10 AM Rechecked and updated the patient on lab and imaging results. Discussed further plan of care.  Pain improved,.  Unfortunately patient notes continued inability to stand or ambulate secondary to pain.  Plan to admit to [facility] inpatient for pain control.  [facility]less[facility] page admitting service.  Patient in agreement with this plan.  11:50 AM.  Hospitalist called back and agrees with the [facility] observation admission.  Decadron ordered.    Pertinent Labs & Imaging studies reviewed. (See chart for details)  59 year old male presents to the Emergency Department for evaluation of low back pain.    At the conclusion of the encounter I discussed the results of all of the tests and the disposition. The questions were answered. The patient or family acknowledged understanding and was agreeable with the care plan.              Medical Decision  Making  Obtained supplemental history:Supplemental history obtained?: No  Reviewed external records: External records reviewed?: Outpatient Record:  Both inpatient and outpatient computer records were reviewed  Care impacted by chronic illness:Diabetes and Hypertension  Care significantly affected by social determinants of health:N/A  Did you consider but not order tests?: Work up considered but not performed and documented in chart, if applicable  Did you interpret images independently?: Independent interpretation of ECG and images noted in documentation, when applicable.  Consultation discussion with other provider:Did you involve another provider (consultant, , pharmacy, etc.)?: I discussed the care with another health care provider, see documentation for details.  Admit.      MEDICATIONS GIVEN IN THE EMERGENCY:  Medications   diazepam (VALIUM) injection 2.5 mg (2.5 mg Intravenous $Given 5/25/24 0917)   ondansetron (ZOFRAN) injection 4 mg (4 mg Intravenous $Given 5/25/24 0912)   HYDROmorphone (DILAUDID) injection 1 mg (1 mg Intravenous $Given 5/25/24 1051)       NEW PRESCRIPTIONS STARTED AT TODAY'S ER VISIT  New Prescriptions    No medications on file          =================================================================    HPI    Patient information was obtained from: Patient     Use of : N/A      Mark Milton Traaseth is a 59 year old male with a pertinent history of T2DM, ht, and chronic kidney disease stage 2 presents to this ED  for evaluation of low back pain. Patient underwent L4 discectomy 20 years ago.  Patient notes onset yesterday of low back pain primarily going down the left leg.  Does not feel like he is emptying the bladder completely.    He does not identify any waxing or waning symptoms otherwise, exacerbating or alleviating features, associated symptoms except as mentioned. He denies any pain related complaints.    REVIEW OF SYSTEMS   Review of Systems patient complaining of low  back pain and neurological symptoms as discussed.    PAST MEDICAL HISTORY:  History reviewed. No pertinent past medical history.    PAST SURGICAL HISTORY:  History reviewed. No pertinent surgical history.        CURRENT MEDICATIONS:    albuterol (PROAIR HFA) 108 (90 Base) MCG/ACT inhaler  azithromycin (ZITHROMAX) 250 MG tablet        ALLERGIES:  Allergies   Allergen Reactions    Codeine Itching    Morphine Itching       FAMILY HISTORY:  History reviewed. No pertinent family history.    SOCIAL HISTORY:   Social History     Socioeconomic History    Marital status:      Spouse name: None    Number of children: None    Years of education: None    Highest education level: None     Social Determinants of Health     Financial Resource Strain: Low Risk  (6/7/2019)    Received from [facility]    Overall Financial Resource Strain (CARDIA)     Difficulty of Paying Living Expenses: Not very hard   Food Insecurity: No Food Insecurity (6/7/2019)    Received from [facility]    Hunger Vital Sign     Worried About Running Out of Food in the Last Year: Never true     Ran Out of Food in the Last Year: Never true   Transportation Needs: No Transportation Needs (6/7/2019)    Received from [facility]    PRAPARE - Transportation     Lack of Transportation (Medical): No     Lack of Transportation (Non-Medical): No   Physical Activity: Inactive (6/7/2019)    Received from [facility]    Exercise Vital Sign     Days of Exercise per Week: 0 days     Minutes of Exercise per Session: 0 min   Stress: Stress Concern Present (7/22/2019)    Received from [facility]    [employer] - Occupational Stress Questionnaire     Feeling of Stress : To some extent   Social Connections: Socially Integrated (7/22/2019)    Received from [facility]    Social Connection and Isolation Panel [NHANES]     Frequency of Communication with Friends and Family: Twice a week     Frequency of Social Gatherings with Friends and  "Family: Once a week     Attends Orthodox Services: More than 4 times per year     Active Member of Clubs or Organizations: Yes     Attends Club or Organization Meetings: More than 4 times per year     Marital Status:        VITALS:  BP (!) 155/70   Pulse 65   Temp 97.4  F (36.3  C) (Oral)   Resp 18   Ht 1.88 m (6' 2\")   Wt 134.7 kg (297 lb)   SpO2 95%   BMI 38.13 kg/m      PHYSICAL EXAM    Vital Signs:  BP (!) 155/70   Pulse 65   Temp 97.4  F (36.3  C) (Oral)   Resp 18   Ht 1.88 m (6' 2\")   Wt 134.7 kg (297 lb)   SpO2 95%   BMI 38.13 kg/m    General:  On entering the room he Peers to be in significant pain and discomfort.  Neck:  Neck supple with full range of motion and nontender.    Back:  Back and spine are nontender except tender in the lumbar spine and pain lateral to the left lumbar area and down the left leg.  Strength and sensation intact and normal in both legs.  No costovertebral angle tenderness.    HEENT:  Oropharynx clear with moist mucous membranes.  HEENT unremarkable.    Pulmonary:  Chest clear to auscultation without rhonchi rales or wheezing.    Cardiovascular:  Cardiac regular rate and rhythm without murmurs rubs or gallops.    Abdomen:  Abdomen soft nontender.  There is no rebound or guarding.    Muskuloskeletal:  He moves all 4 without any difficulty and has normal neurovascular exams.  Extremities without clubbing, cyanosis, or edema.  Legs and calves are nontender.    Neuro:  He is alert and oriented ×3 and moves all extremities symmetrically.    Psych:  Normal affect.    Skin:  Unremarkable and warm and dry.       LAB:  All pertinent labs reviewed and interpreted.  Labs Ordered and Resulted from Time of ED Arrival to Time of ED Departure   ROUTINE UA WITH MICROSCOPIC REFLEX TO CULTURE - Abnormal       Result Value    Color Urine Light Yellow      Appearance Urine Clear      Glucose Urine Negative      Bilirubin Urine Negative      Ketones Urine Negative      Specific " Gravity Urine 1.013      Blood Urine Negative      pH Urine 7.5 (*)     Protein Albumin Urine 70 (*)     Urobilinogen Urine <2.0      Nitrite Urine Negative      Leukocyte Esterase Urine Negative      RBC Urine 1      WBC Urine 1      Squamous Epithelials Urine 1     BASIC METABOLIC PANEL - Abnormal    Sodium 139      Potassium 5.1      Chloride 106      Carbon Dioxide (CO2) 23      Anion Gap 10      Urea Nitrogen 23.7 (*)     Creatinine 1.40 (*)     GFR Estimate 59 (*)     Calcium 9.2      Glucose 182 (*)    CBC WITH PLATELETS AND DIFFERENTIAL    WBC Count 8.5      RBC Count 5.03      Hemoglobin 14.6      Hematocrit 43.8      MCV 87      MCH 29.0      MCHC 33.3      RDW 14.3      Platelet Count 349      % Neutrophils 66      % Lymphocytes 24      % Monocytes 7      % Eosinophils 2      % Basophils 0      % Immature Granulocytes 0      NRBCs per 100 WBC 0      Absolute Neutrophils 5.7      Absolute Lymphocytes 2.1      Absolute Monocytes 0.6      Absolute Eosinophils 0.2      Absolute Basophils 0.0      Absolute Immature Granulocytes 0.0      Absolute NRBCs 0.0         RADIOLOGY:  Reviewed all pertinent imaging. Please see official radiology report.  Lumbar spine MRI w/o contrast   Preliminary Result   IMPRESSION:   1.  Unremarkable cord and cauda equina nerve roots.   2.  No high-grade spinal canal stenosis.   3.  At L3-L4, there is a left-sided extrusion which contributes to left lateral recess stenosis and impingement upon the descending left L4 nerve.   4.  At L4-L5, there is a central disc herniation without high-grade stenosis of the spinal canal or neural foramina. Prior right hemilaminotomy.   5.  At L5-S1, there is moderate bilateral neural foraminal stenosis.                       EKG:          PROCEDURES:         I,Mark Peraza, am serving as a scribe to document services personally performed by Dr. Vivar based on my observation and the provider's statements to me. I, Kevan Vivar MD attest that is  acting in a scribe capacity, has observed my performance of the services and has documented them in accordance with my direction.    Kevan Vivar M.D.  Emergency Medicine  [facility] EMERGENCY ROOM  [address] [facility] [id number] [phone]  Dept: [phone]       Kevan Vivar MD  05/25/24 1116       Kevan Vivar MD  05/25/24 1158

## 2024-05-25 NOTE — PROGRESS NOTES
Briefly assumed care of pt. Blood glucose was 146, pt declined sliding scale coverage.     Report given to LILLIANA Franco.    Kellie Gonzalez RN

## 2024-05-25 NOTE — PHARMACY-ADMISSION MEDICATION HISTORY
Pharmacist Admission Medication History    Admission medication history is complete. The information provided in this note is only as accurate as the sources available at the time of the update.    Information Source(s): Patient and CareEverywhere/SureScripts via in-person    Pertinent Information:     Patient only sometimes takes allopurinol depending on if his uric acid levels are elevated    Patient supposed to take carvedilol 25mg bid but the last week he's only been taking in the morning.  He was worried about side effects.    Hydralazine - patient supposed to take tid but only takes bid    Changes made to PTA medication list:  Added: All medications  Deleted: azithromycin, albuterol  Changed: None    Allergies reviewed with patient and updates made in EHR: yes    Medication History Completed By: Garima Black RPH 5/25/2024 12:59 PM    PTA Med List   Medication Sig Note Last Dose    allopurinol (ZYLOPRIM) 100 MG tablet Take 100 mg by mouth daily 5/25/2024: Only has been taking when uric acid comes back elevated ~ 1 weeks ago    amLODIPine (NORVASC) 10 MG tablet Take 10 mg by mouth daily  5/25/2024 at Am    aspirin 81 MG EC tablet Take 81 mg by mouth daily  5/25/2024 at AM    buPROPion (WELLBUTRIN XL) 300 MG 24 hr tablet Take 300 mg by mouth every morning  5/25/2024 at AM    carvedilol (COREG) 25 MG tablet Take 25 mg by mouth 2 times daily (with meals) 5/25/2024: Patient has only been taking in the morning the last week because he's concerned about side effects 5/25/2024 at AM    CHERRY CONCENTRATE PO Take 2 tablets by mouth 3 times daily  5/25/2024 at AM    CHROMIUM PICOLINATE PO Take 1 tablet by mouth 2 times daily (before meals)  5/25/2024 at AM    DANDELION PO Take 1 tablet by mouth daily  5/25/2024 at AM    hydrALAZINE (APRESOLINE) 100 MG tablet Take 100 mg by mouth 2 times daily  5/25/2024 at AM    hydrOXYzine HCl (ATARAX) 10 MG tablet Take 10-20 mg by mouth 2 times daily as needed for itching  Unknown     insulin glargine (LANTUS PEN) 100 UNIT/ML pen Inject 100 Units Subcutaneous at bedtime  5/24/2024    insulin lispro (HUMALOG KWIKPEN) 100 UNIT/ML (1 unit dial) KWIKPEN Inject 0-12 Units Subcutaneous 3 times daily (before meals) Hasn't been using much, but if BG>190 he takes 12 units 5/25/2024: Hasn't been using much since starting Tirzepatide  Past Month    L-GLUTAMINE PO Take 5 g by mouth 2 times daily  5/25/2024 at AM    Levomefolic Acid (5-MTHF) 1 MG CAPS Take 1-7 mg by mouth daily  5/25/2024 at AM    levothyroxine (SYNTHROID/LEVOTHROID) 50 MCG tablet Take 50 mcg by mouth daily  5/25/2024 at AM    losartan (COZAAR) 100 MG tablet Take 100 mg by mouth daily  5/25/2024 at AM    MAGNESIUM GLYCINATE PO Take 2 capsules by mouth 2 times daily  5/25/2024 at AM    nitroGLYcerin (NITROSTAT) 0.4 MG sublingual tablet Place 0.4 mg under the tongue every 5 minutes as needed for chest pain For chest pain place 1 tablet under the tongue every 5 minutes for 3 doses. If symptoms persist 5 minutes after 1st dose call 911.  Unknown    omeprazole (PRILOSEC) 20 MG DR capsule Take 20 mg by mouth daily  5/25/2024 at AM    pyridOXINE (VITAMIN B-6) 50 MG tablet Take 50 mg by mouth daily  5/25/2024 at AM    red yeast rice 600 MG CAPS Take 600 mg by mouth 2 times daily  5/25/2024 at AM    sildenafil (REVATIO) 20 MG tablet Take 100 mg by mouth daily as needed  Unknown    tirzepatide (MOUNJARO) 10 MG/0.5ML pen Inject 10 mg Subcutaneous every 7 days  5/24/2024    torsemide (DEMADEX) 20 MG tablet Take 40 mg by mouth daily  5/25/2024 at AM    valACYclovir (VALTREX) 1000 mg tablet Take 1,000 mg by mouth 2 times daily as needed X 1 day for cold sores      vitamin B complex with vitamin C (VITAMIN  B COMPLEX) tablet Take 1 tablet by mouth daily  5/25/2024 at AM

## 2024-05-25 NOTE — PROGRESS NOTES
Brief Neurosurgery Note    Consult received. Per chart review, patient is a 59M with an acute onset back pain with left leg pain which begun 1 day ago. No injuries or trauma noted. Per documentation, no overt weakness noted on exam. Is experiencing urinary retention requiring Springer catheter placement.     No significant pathology noted on lumbar MRI to explain urinary retention.    Lumbar MRI:  IMPRESSION:  1.  Unremarkable cord and cauda equina nerve roots.  2.  No high-grade spinal canal stenosis.  3.  At L3-L4, there is a left-sided extrusion which contributes to left lateral recess stenosis and impingement upon the descending left L4 nerve.  4.  At L4-L5, there is a central disc herniation without high-grade stenosis of the spinal canal or neural foramina. Prior right hemilaminotomy.  5.  At L5-S1, there is moderate bilateral neural foraminal stenosis.    Discussed with Dr. Lind    Recommendations:  -Agree with pain management as per primary service  -No plans for neurosurgical intervention at this time  -Consider urology consult for urinary retention.  -Could consider left L3-4 transformainal JUAN if symptoms persist  -Neurosurgery will see in consultation tomorrow    Ana Paula Melton [facility] Neurosurgery  [address] [place] [id number]  Tel [phone]  Fax [phone]  Text page via [facility] Paging/Directory

## 2024-05-25 NOTE — ED NOTES
Bladder scanned pt for 882 mLs. Pt felt the urge to urinate. Stood and used urinal but only able to void 100 mLs. Post void bladder scan was 776 mLs. Updated MD and anderson ordered for acute retention.

## 2024-05-25 NOTE — PROGRESS NOTES
"PRIMARY DIAGNOSIS: ACUTE PAIN  OUTPATIENT/OBSERVATION GOALS TO BE MET BEFORE DISCHARGE:  1. Pain Status: Improved but still requiring IV narcotics. Continues to report intermittent severe left hip to groin \"stabbing\" pain. Utilizing IV Dilaudid to manage. Also on scheduled Decadron and Valium.     2. Return to near baseline physical activity: No    3. Cleared for discharge by consultants (if involved): No - Neurosurgery eval tomorrow (5/26).     Discharge Planner Nurse   Safe discharge environment identified: No  Barriers to discharge: Yes       Entered by: Contreras Alfaro RN 05/25/2024 6:27 PM     Please review provider order for any additional goals.   Nurse to notify provider when observation goals have been met and patient is ready for discharge.  "

## 2024-05-25 NOTE — H&P
[facility] MEDICINE ADMISSION HISTORY AND PHYSICAL     Assessment & Plan        59 year old into [facility] on 5/25/2024 after presenting to the ER with acute back pain and left leg pain.   Pt states symptoms began 1 day ago.  He denies  Bowel, bladder issues or trauma. He travels a lot.  He has been on his feet all day for the last 6 days.    PMHx includes CAD, HTN, diabetes mellitus type 2, remote history of lumbar surgery.  He works in the food industry.    Diagnostics in the ER included a lumbar MRI.  Results show significant  Djd in the lumbar spine along iwht a L3-4 left sided herniated disc.  He has left  Lateral recess stenosis and nerve impingement.    He states he had lumbar surgery many years ago in [place].    On my interview in the ER at 1230 he is lying on his right side on the  Stretcher in pain.  He has received dilaudid, dexamethasone along with  Valium.  He feels mild relief.      Pt will be admitted for refractury acute low back pain and left leg radiculopathy.  Clinical goals are pain relief, tolerating ambulation and a NS consult for further  Input.  Pt understands diagnosis and agrees with admission.          Problem list:     Acute low back pain with left leg radiculopathy: dilaudid (iv and oral ), valium   Scheduled today; dexamethasone burst; NS consult; PT evaluation   Tomorrow if pain is controlled    2.  HTN: losartan at 1/2 dose, continue norvasc , hydralazine  3.  CAD: history of stents; on baby aspirin daily  4.  DM2, insulin dependent: resume home insulin dosing of lantus at night and bolus   Insulin in daytime; will review need for additional adjunctive meds  5.  Dvt prevention:  scd      Chief Complaint  Back and left leg pain       Past Medical History     HTN, CAD, DM2,     Surgical History   History reviewed. No pertinent surgical history.  Family History    Reviewed, and History reviewed. No pertinent family history.     Social History         , works in food industry; no tobacco    Allergies     Allergies   Allergen Reactions     Codeine Itching     Morphine Itching     Prior to Admission Medications      Prior to Admission Medications   Prescriptions Last Dose Informant Patient Reported? Taking?   albuterol (PROAIR HFA) 108 (90 Base) MCG/ACT inhaler   No No   Sig: Inhale 2 puffs into the lungs every 4 hours as needed for shortness of breath / dyspnea   azithromycin (ZITHROMAX) 250 MG tablet   No No   Si tabs day one, 1 tab days 2-5      Facility-Administered Medications: None      Review of Systems     A 12 point comprehensive review of systems was negative except as noted above in HPI.    PHYSICAL EXAMINATION       Vitals      Temp:  [97.4  F (36.3  C)] 97.4  F (36.3  C)  Pulse:  [65-66] 65  Resp:  [18] 18  BP: (155-213)/(70-96) 155/70  SpO2:  [88 %-97 %] 95 %    Examination     GENERAL:  Alert, no moderate distress   HEAD:  Normocephalic, without obvious abnormality, atraumatic   EYES:  PERRL, conjunctiva/corneas clear, no scleral icterus, EOM's intact   NOSE: Nares normal, septum midline, mucosa normal, no drainage   THROAT: Lips, mucosa, and tongue normal; teeth and gums normal, mouth moist   NECK: Supple, symmetrical, trachea midline   BACK:   Symmetric, no curvature, ROM normal   LUNGS:   Clear to auscultation bilaterally, no rales, rhonchi, or wheezing, symmetric chest rise on inhalation, respirations unlabored   CHEST WALL:  No tenderness or deformity   HEART:  Regular rate and rhythm, S1 and S2 normal, no murmur, rub, or gallop    ABDOMEN:   Soft, non-tender, bowel sounds active all four quadrants, no masses, no organomegaly, no rebound or guarding   EXTREMITIES: No edema   SKIN: Dry to touch, no exanthems in the visualized areas   NEURO: Alert, nonfocal; not gaited   PSYCH: Cooperative, behavior is appropriate      Pertinent Lab         Pertinent Radiology     Radiology Results:   Recent Results (from the past 24 hour(s))   Lumbar  spine MRI w/o contrast    Narrative    EXAM: MR LUMBAR SPINE WITHOUT CONTRAST  LOCATION: [facility]  DATE: 05/25/2024    INDICATION: Low back pain, going down legs, incomplete bladder emptying.  COMPARISON: CT 07/22/2023.  TECHNIQUE: Routine Lumbar Spine MRI without IV contrast.    FINDINGS:   NOMENCLATURE: Five lumbar-type vertebral bodies.    ALIGNMENT: Straightening of the normal lumbar lordosis. Mild L5-S1 retrolisthesis.    BONES: There is no evidence of a marrow-replacing process. There is no evidence of abnormal bony edema. Vertebral body heights are unremarkable. The visualized portions of the sacrum and amos appear intact.    DISCS: Moderate lower lumbar spondylosis described in further detail below.    SPINAL CORD: The conus terminates at L1. No signal abnormalities of the imaged cord or cauda equina nerve roots are demonstrated.    SPINAL CANAL: No high-grade stenosis.    PARASPINAL SOFT TISSUES: Unremarkable.    ABDOMINAL CAVITY: No acute abnormality demonstrated within technique limitations.    SIGNIFICANT FINDINGS BY LEVEL:    T12-L1: Normal disc height and signal. No herniation. Normal facets. No spinal canal or neural foraminal stenosis.     L1-L2: Normal disc height and signal. No herniation. Normal facets. No spinal canal or neural foraminal stenosis.    L2-L3: Normal disc height and signal. No herniation. Normal facets. No spinal canal or neural foraminal stenosis.     L3-L4: Mild intervertebral disc height loss. Disc desiccation. Shallow bulge. Inferiorly directed central/left paracentral extrusion. Mild facet arthropathy. Mild spinal canal stenosis. Moderate left lateral recess stenosis with impingement upon the   descending left L4 nerve root. Mild bilateral neural foraminal stenosis.    L4-L5: Moderate-to-advanced intervertebral disc height loss. Disc desiccation. Broad-based bulge and interbody spurring. Central disc protrusion. Mild facet arthropathy. Mild spinal  canal stenosis. Mild right and mild left neural foraminal stenosis. Mild   facet arthropathy. Prior right hemilaminotomy.    L5-S1: Moderate-to-advanced intervertebral disc height loss. Disc desiccation. Broad-based disc-osteophyte complex. Mild right-sided facet arthropathy. No significant spinal canal stenosis. Mild right lateral recess stenosis. Moderate bilateral neural   foraminal stenosis.      Impression    IMPRESSION:  1.  Unremarkable cord and cauda equina nerve roots.  2.  No high-grade spinal canal stenosis.  3.  At L3-L4, there is a left-sided extrusion which contributes to left lateral recess stenosis and impingement upon the descending left L4 nerve.  4.  At L4-L5, there is a central disc herniation without high-grade stenosis of the spinal canal or neural foramina. Prior right hemilaminotomy.  5.  At L5-S1, there is moderate bilateral neural foraminal stenosis.         Advance Care Planning        Shyann Elizondo MD  [facility]   Phone: #[phone]

## 2024-05-26 ENCOUNTER — APPOINTMENT (OUTPATIENT)
Dept: PHYSICAL THERAPY | Facility: CLINIC | Age: 59
DRG: 552 | End: 2024-05-26
Attending: EMERGENCY MEDICINE
Payer: COMMERCIAL

## 2024-05-26 LAB
ANION GAP SERPL CALCULATED.3IONS-SCNC: 16 MMOL/L (ref 7–15)
BUN SERPL-MCNC: 33.8 MG/DL (ref 6–20)
CALCIUM SERPL-MCNC: 9.6 MG/DL (ref 8.6–10)
CHLORIDE SERPL-SCNC: 100 MMOL/L (ref 98–107)
CREAT SERPL-MCNC: 1.45 MG/DL (ref 0.67–1.17)
DEPRECATED HCO3 PLAS-SCNC: 17 MMOL/L (ref 22–29)
EGFRCR SERPLBLD CKD-EPI 2021: 56 ML/MIN/1.73M2
GLUCOSE BLDC GLUCOMTR-MCNC: 146 MG/DL (ref 70–99)
GLUCOSE BLDC GLUCOMTR-MCNC: 207 MG/DL (ref 70–99)
GLUCOSE BLDC GLUCOMTR-MCNC: 220 MG/DL (ref 70–99)
GLUCOSE BLDC GLUCOMTR-MCNC: 250 MG/DL (ref 70–99)
GLUCOSE BLDC GLUCOMTR-MCNC: 290 MG/DL (ref 70–99)
GLUCOSE BLDC GLUCOMTR-MCNC: 358 MG/DL (ref 70–99)
GLUCOSE SERPL-MCNC: 269 MG/DL (ref 70–99)
POTASSIUM SERPL-SCNC: 5.4 MMOL/L (ref 3.4–5.3)
SODIUM SERPL-SCNC: 133 MMOL/L (ref 135–145)

## 2024-05-26 PROCEDURE — 99203 OFFICE O/P NEW LOW 30 MIN: CPT | Performed by: NURSE PRACTITIONER

## 2024-05-26 PROCEDURE — 96375 TX/PRO/DX INJ NEW DRUG ADDON: CPT

## 2024-05-26 PROCEDURE — 250N000011 HC RX IP 250 OP 636: Performed by: EMERGENCY MEDICINE

## 2024-05-26 PROCEDURE — G0378 HOSPITAL OBSERVATION PER HR: HCPCS

## 2024-05-26 PROCEDURE — 258N000003 HC RX IP 258 OP 636: Performed by: EMERGENCY MEDICINE

## 2024-05-26 PROCEDURE — 82962 GLUCOSE BLOOD TEST: CPT

## 2024-05-26 PROCEDURE — 250N000013 HC RX MED GY IP 250 OP 250 PS 637: Performed by: EMERGENCY MEDICINE

## 2024-05-26 PROCEDURE — 97110 THERAPEUTIC EXERCISES: CPT | Mod: GP

## 2024-05-26 PROCEDURE — 99233 SBSQ HOSP IP/OBS HIGH 50: CPT | Performed by: EMERGENCY MEDICINE

## 2024-05-26 PROCEDURE — 80048 BASIC METABOLIC PNL TOTAL CA: CPT | Performed by: EMERGENCY MEDICINE

## 2024-05-26 PROCEDURE — 96376 TX/PRO/DX INJ SAME DRUG ADON: CPT

## 2024-05-26 PROCEDURE — 97530 THERAPEUTIC ACTIVITIES: CPT | Mod: GP

## 2024-05-26 PROCEDURE — 36415 COLL VENOUS BLD VENIPUNCTURE: CPT | Performed by: EMERGENCY MEDICINE

## 2024-05-26 PROCEDURE — 97161 PT EVAL LOW COMPLEX 20 MIN: CPT | Mod: GP

## 2024-05-26 PROCEDURE — 250N000013 HC RX MED GY IP 250 OP 250 PS 637: Performed by: INTERNAL MEDICINE

## 2024-05-26 RX ORDER — CARVEDILOL 25 MG/1
25 TABLET ORAL 2 TIMES DAILY WITH MEALS
Status: DISCONTINUED | OUTPATIENT
Start: 2024-05-26 | End: 2024-05-30 | Stop reason: HOSPADM

## 2024-05-26 RX ORDER — HYDRALAZINE HYDROCHLORIDE 20 MG/ML
10 INJECTION INTRAMUSCULAR; INTRAVENOUS EVERY 6 HOURS PRN
Status: DISCONTINUED | OUTPATIENT
Start: 2024-05-26 | End: 2024-05-30 | Stop reason: HOSPADM

## 2024-05-26 RX ORDER — SODIUM CHLORIDE 9 MG/ML
INJECTION, SOLUTION INTRAVENOUS CONTINUOUS
Status: DISCONTINUED | OUTPATIENT
Start: 2024-05-26 | End: 2024-05-27

## 2024-05-26 RX ORDER — TAMSULOSIN HYDROCHLORIDE 0.4 MG/1
0.4 CAPSULE ORAL DAILY
Status: DISCONTINUED | OUTPATIENT
Start: 2024-05-26 | End: 2024-05-30 | Stop reason: HOSPADM

## 2024-05-26 RX ORDER — GABAPENTIN 300 MG/1
300 CAPSULE ORAL AT BEDTIME
Status: DISCONTINUED | OUTPATIENT
Start: 2024-05-26 | End: 2024-05-27

## 2024-05-26 RX ADMIN — HYDROMORPHONE HYDROCHLORIDE 1 MG: 1 INJECTION, SOLUTION INTRAMUSCULAR; INTRAVENOUS; SUBCUTANEOUS at 03:02

## 2024-05-26 RX ADMIN — DIAZEPAM 2.5 MG: 5 TABLET ORAL at 13:41

## 2024-05-26 RX ADMIN — DIAZEPAM 2.5 MG: 5 TABLET ORAL at 20:46

## 2024-05-26 RX ADMIN — LEVOTHYROXINE SODIUM 50 MCG: 0.05 TABLET ORAL at 07:36

## 2024-05-26 RX ADMIN — HYDROMORPHONE HYDROCHLORIDE 1 MG: 1 INJECTION, SOLUTION INTRAMUSCULAR; INTRAVENOUS; SUBCUTANEOUS at 21:52

## 2024-05-26 RX ADMIN — CARVEDILOL 25 MG: 25 TABLET, FILM COATED ORAL at 17:14

## 2024-05-26 RX ADMIN — DIAZEPAM 2.5 MG: 10 INJECTION, SOLUTION INTRAMUSCULAR; INTRAVENOUS at 08:36

## 2024-05-26 RX ADMIN — HYDROMORPHONE HYDROCHLORIDE 1 MG: 2 TABLET ORAL at 02:04

## 2024-05-26 RX ADMIN — HYDROMORPHONE HYDROCHLORIDE 1 MG: 1 INJECTION, SOLUTION INTRAMUSCULAR; INTRAVENOUS; SUBCUTANEOUS at 12:43

## 2024-05-26 RX ADMIN — HYDROMORPHONE HYDROCHLORIDE 3 MG: 2 TABLET ORAL at 09:41

## 2024-05-26 RX ADMIN — SENNOSIDES AND DOCUSATE SODIUM 2 TABLET: 50; 8.6 TABLET ORAL at 03:25

## 2024-05-26 RX ADMIN — HYDROMORPHONE HYDROCHLORIDE 1 MG: 1 INJECTION, SOLUTION INTRAMUSCULAR; INTRAVENOUS; SUBCUTANEOUS at 07:36

## 2024-05-26 RX ADMIN — DEXAMETHASONE SODIUM PHOSPHATE 4 MG: 4 INJECTION, SOLUTION INTRAMUSCULAR; INTRAVENOUS at 05:37

## 2024-05-26 RX ADMIN — INSULIN GLARGINE 100 UNITS: 100 INJECTION, SOLUTION SUBCUTANEOUS at 20:55

## 2024-05-26 RX ADMIN — LOSARTAN POTASSIUM 50 MG: 50 TABLET, FILM COATED ORAL at 08:36

## 2024-05-26 RX ADMIN — TAMSULOSIN HYDROCHLORIDE 0.4 MG: 0.4 CAPSULE ORAL at 13:41

## 2024-05-26 RX ADMIN — SODIUM CHLORIDE: 9 INJECTION, SOLUTION INTRAVENOUS at 20:50

## 2024-05-26 RX ADMIN — HYDROMORPHONE HYDROCHLORIDE 1 MG: 1 INJECTION, SOLUTION INTRAMUSCULAR; INTRAVENOUS; SUBCUTANEOUS at 17:12

## 2024-05-26 RX ADMIN — HYDROMORPHONE HYDROCHLORIDE 3 MG: 2 TABLET ORAL at 15:07

## 2024-05-26 RX ADMIN — HYDRALAZINE HYDROCHLORIDE 100 MG: 50 TABLET, FILM COATED ORAL at 22:18

## 2024-05-26 RX ADMIN — HYDRALAZINE HYDROCHLORIDE 10 MG: 20 INJECTION INTRAMUSCULAR; INTRAVENOUS at 15:14

## 2024-05-26 RX ADMIN — HYDROXYZINE HYDROCHLORIDE 25 MG: 25 TABLET ORAL at 15:07

## 2024-05-26 RX ADMIN — ACETAMINOPHEN 975 MG: 325 TABLET ORAL at 20:46

## 2024-05-26 RX ADMIN — PANTOPRAZOLE SODIUM 40 MG: 40 TABLET, DELAYED RELEASE ORAL at 07:36

## 2024-05-26 RX ADMIN — DIPHENHYDRAMINE HYDROCHLORIDE 25 MG: 50 INJECTION, SOLUTION INTRAMUSCULAR; INTRAVENOUS at 09:41

## 2024-05-26 RX ADMIN — AMLODIPINE BESYLATE 10 MG: 10 TABLET ORAL at 08:36

## 2024-05-26 RX ADMIN — ASPIRIN 81 MG: 81 TABLET, COATED ORAL at 12:25

## 2024-05-26 RX ADMIN — DIPHENHYDRAMINE HYDROCHLORIDE 25 MG: 50 INJECTION, SOLUTION INTRAMUSCULAR; INTRAVENOUS at 03:22

## 2024-05-26 RX ADMIN — DIPHENHYDRAMINE HYDROCHLORIDE 25 MG: 50 INJECTION, SOLUTION INTRAMUSCULAR; INTRAVENOUS at 21:52

## 2024-05-26 RX ADMIN — BUPROPION HYDROCHLORIDE 300 MG: 150 TABLET, EXTENDED RELEASE ORAL at 07:36

## 2024-05-26 RX ADMIN — GABAPENTIN 300 MG: 300 CAPSULE ORAL at 20:46

## 2024-05-26 RX ADMIN — ACETAMINOPHEN 975 MG: 325 TABLET ORAL at 13:41

## 2024-05-26 RX ADMIN — CARVEDILOL 25 MG: 25 TABLET, FILM COATED ORAL at 08:36

## 2024-05-26 RX ADMIN — DEXAMETHASONE SODIUM PHOSPHATE 4 MG: 4 INJECTION, SOLUTION INTRAMUSCULAR; INTRAVENOUS at 17:12

## 2024-05-26 RX ADMIN — ACETAMINOPHEN 975 MG: 325 TABLET ORAL at 07:35

## 2024-05-26 RX ADMIN — SODIUM CHLORIDE: 9 INJECTION, SOLUTION INTRAVENOUS at 11:25

## 2024-05-26 RX ADMIN — HYDRALAZINE HYDROCHLORIDE 100 MG: 50 TABLET, FILM COATED ORAL at 08:44

## 2024-05-26 RX ADMIN — ALLOPURINOL 100 MG: 100 TABLET ORAL at 07:36

## 2024-05-26 RX ADMIN — HYDROMORPHONE HYDROCHLORIDE 3 MG: 2 TABLET ORAL at 20:46

## 2024-05-26 RX ADMIN — HYDROMORPHONE HYDROCHLORIDE 1 MG: 1 INJECTION, SOLUTION INTRAMUSCULAR; INTRAVENOUS; SUBCUTANEOUS at 10:41

## 2024-05-26 RX ADMIN — DEXAMETHASONE SODIUM PHOSPHATE 4 MG: 4 INJECTION, SOLUTION INTRAMUSCULAR; INTRAVENOUS at 11:27

## 2024-05-26 RX ADMIN — HYDROXYZINE HYDROCHLORIDE 25 MG: 25 TABLET ORAL at 08:36

## 2024-05-26 RX ADMIN — HYDROXYZINE HYDROCHLORIDE 25 MG: 25 TABLET ORAL at 04:37

## 2024-05-26 ASSESSMENT — ACTIVITIES OF DAILY LIVING (ADL)
ADLS_ACUITY_SCORE: 36
ADLS_ACUITY_SCORE: 36
ADLS_ACUITY_SCORE: 35
ADLS_ACUITY_SCORE: 35
ADLS_ACUITY_SCORE: 36
ADLS_ACUITY_SCORE: 35
ADLS_ACUITY_SCORE: 36
ADLS_ACUITY_SCORE: 36
ADLS_ACUITY_SCORE: 35
ADLS_ACUITY_SCORE: 36
ADLS_ACUITY_SCORE: 35
ADLS_ACUITY_SCORE: 36
ADLS_ACUITY_SCORE: 35
ADLS_ACUITY_SCORE: 35
ADLS_ACUITY_SCORE: 36
ADLS_ACUITY_SCORE: 35
ADLS_ACUITY_SCORE: 35
ADLS_ACUITY_SCORE: 36

## 2024-05-26 NOTE — PROGRESS NOTES
PRIMARY DIAGNOSIS: ACUTE PAIN  OUTPATIENT/OBSERVATION GOALS TO BE MET BEFORE DISCHARGE:  1. Pain Status: No improvement noted. Consider adjustment in pain regimen.    2. Return to near baseline physical activity: No    3. Cleared for discharge by consultants (if involved): No    Discharge Planner Nurse   Safe discharge environment identified: No  Barriers to discharge: Yes       Entered by: Contreras Alfaro RN 05/26/2024 2:40 PM     Please review provider order for any additional goals.   Nurse to notify provider when observation goals have been met and patient is ready for discharge.

## 2024-05-26 NOTE — PROGRESS NOTES
PRIMARY DIAGNOSIS: ACUTE PAIN  OUTPATIENT/OBSERVATION GOALS TO BE MET BEFORE DISCHARGE:  1. Pain Status: Improved but still requiring IV narcotics.    2. Return to near baseline physical activity: No    3. Cleared for discharge by consultants (if involved): No    Discharge Planner Nurse   Safe discharge environment identified: Yes  Barriers to discharge: Yes        Entered by: Contreras Alfaro RN 05/26/2024 5:29 PM     Please review provider order for any additional goals.   Nurse to notify provider when observation goals have been met and patient is ready for discharge.

## 2024-05-26 NOTE — CONSULTS
"[facility]    Neurosurgery Consultation     Date of Admission:  5/25/2024  Date of Consult (When I saw the patient): 05/26/24    Assessment & Plan   Mark Milton Traaseth is a 59 year old male who was admitted on 5/25/2024. I was asked to see the patient for \"acute severe back pain with left leg radiculopathy, nerve impingement.\"    Active Problems:    Intractable low back pain    Assessment: left-sided disc extrusion which contributes to left lateral recess stenosis and impingement upon the descending left L4 nerve     Plan:   -No plans for neurosurgical intervention at this time  -Left L3-4 transforamainal JUAN ordered with IR, likely not able to complete until Tuesday at the earliest  -Contact IR regarding ASA  -Continue pain management per primary service  -Appreciate assistance from hospitalist regarding blood sugars on steroids  -Therapy as tolerated  -Urinary retention likely related to inability to stand vs structural issue, consider voiding trial when able to stand  -Neurosurgery will continue to chart check and re-evaluate after JUAN  -Please contact neurosurgery with questions or concerns    I have discussed the following assessment and plan with Dr Lind who is in agreement with initial plan and will follow up with further consultation recommendations.    Ana Paula Melton, JENA  [facility] Neurosurgery  [place] [address]  Tel [phone]  Text page via [employer] Paging/Directory    Code Status    Full Code    Reason for Consult   Reason for consult: I was asked by Dr. Elizondo to evaluate this patient for \"acute severe back pain with left leg radiculopathy, nerve impingement.\"    Primary Care Physician   True Nogueira    Chief Complaint   Back and leg pain    History is obtained from the patient, family, and EMR.     History of Present Illness   Mark Milton Traaseth is a 59 year old male with a history of " prior L4-5 decompression 20 years ago who presents with a 2 day history of back and leg pain. He states he was doing a lot of walking/standing in the 6 days prior to admission. No acute injury noted. He describes midline low back pain which radiates to the left lateral hip and left anterolateral thigh to the knee and intermittently to the ankle/foot. He has baseline peripheral neuropathy with worsened numbness in the left foot/ankle. No overt weakness. Unable to put weight on his leg due to severe pain.     Past Medical History   I have reviewed this patient's medical history and updated it with pertinent information if needed.   History reviewed. No pertinent past medical history.    Past Surgical History   I have reviewed this patient's surgical history and updated it with pertinent information if needed.  History reviewed. No pertinent surgical history.    Prior to Admission Medications   Prior to Admission Medications   Prescriptions Last Dose Informant Patient Reported? Taking?   CHERRY CONCENTRATE PO 5/25/2024 at AM  Yes Yes   Sig: Take 2 tablets by mouth 3 times daily   CHROMIUM PICOLINATE PO 5/25/2024 at AM  Yes Yes   Sig: Take 1 tablet by mouth 2 times daily (before meals)   DANDELION PO 5/25/2024 at AM  Yes Yes   Sig: Take 1 tablet by mouth daily   L-GLUTAMINE PO 5/25/2024 at AM  Yes Yes   Sig: Take 5 g by mouth 2 times daily   Levomefolic Acid (5-MTHF) 1 MG CAPS 5/25/2024 at AM  Yes Yes   Sig: Take 1-7 mg by mouth daily   MAGNESIUM GLYCINATE PO 5/25/2024 at AM  Yes Yes   Sig: Take 2 capsules by mouth 2 times daily   allopurinol (ZYLOPRIM) 100 MG tablet ~ 1 weeks ago  Yes Yes   Sig: Take 100 mg by mouth daily   amLODIPine (NORVASC) 10 MG tablet 5/25/2024 at Am  Yes Yes   Sig: Take 10 mg by mouth daily   aspirin 81 MG EC tablet 5/25/2024 at AM  Yes Yes   Sig: Take 81 mg by mouth daily   buPROPion (WELLBUTRIN XL) 300 MG 24 hr tablet 5/25/2024 at AM  Yes Yes   Sig: Take 300 mg by mouth every morning    carvedilol (COREG) 25 MG tablet 5/25/2024 at AM  Yes Yes   Sig: Take 25 mg by mouth 2 times daily (with meals)   hydrALAZINE (APRESOLINE) 100 MG tablet 5/25/2024 at AM  Yes Yes   Sig: Take 100 mg by mouth 2 times daily   hydrOXYzine HCl (ATARAX) 10 MG tablet Unknown  Yes Yes   Sig: Take 10-20 mg by mouth 2 times daily as needed for itching   insulin glargine (LANTUS PEN) 100 UNIT/ML pen 5/24/2024  Yes Yes   Sig: Inject 100 Units Subcutaneous at bedtime   insulin lispro (HUMALOG KWIKPEN) 100 UNIT/ML (1 unit dial) KWIKPEN Past Month  Yes Yes   Sig: Inject 0-12 Units Subcutaneous 3 times daily (before meals) Hasn't been using much, but if BG>190 he takes 12 units   levothyroxine (SYNTHROID/LEVOTHROID) 50 MCG tablet 5/25/2024 at AM  Yes Yes   Sig: Take 50 mcg by mouth daily   losartan (COZAAR) 100 MG tablet 5/25/2024 at AM  Yes Yes   Sig: Take 100 mg by mouth daily   nitroGLYcerin (NITROSTAT) 0.4 MG sublingual tablet Unknown  Yes Yes   Sig: Place 0.4 mg under the tongue every 5 minutes as needed for chest pain For chest pain place 1 tablet under the tongue every 5 minutes for 3 doses. If symptoms persist 5 minutes after 1st dose call 911.   omeprazole (PRILOSEC) 20 MG DR capsule 5/25/2024 at AM  Yes Yes   Sig: Take 20 mg by mouth daily   pyridOXINE (VITAMIN B-6) 50 MG tablet 5/25/2024 at AM  Yes Yes   Sig: Take 50 mg by mouth daily   red yeast rice 600 MG CAPS 5/25/2024 at AM  Yes Yes   Sig: Take 600 mg by mouth 2 times daily   sildenafil (REVATIO) 20 MG tablet Unknown  Yes Yes   Sig: Take 100 mg by mouth daily as needed   tirzepatide (MOUNJARO) 10 MG/0.5ML pen 5/24/2024  Yes Yes   Sig: Inject 10 mg Subcutaneous every 7 days   torsemide (DEMADEX) 20 MG tablet 5/25/2024 at AM  Yes Yes   Sig: Take 40 mg by mouth daily   valACYclovir (VALTREX) 1000 mg tablet   Yes Yes   Sig: Take 1,000 mg by mouth 2 times daily as needed X 1 day for cold sores   vitamin B complex with vitamin C (VITAMIN  B COMPLEX) tablet 5/25/2024 at AM  " Yes Yes   Sig: Take 1 tablet by mouth daily      Facility-Administered Medications: None     Allergies   Allergies   Allergen Reactions    Codeine Itching    Hydromorphone Itching     Can take with benadryl    Morphine Itching     Can take with benadryl       Social History   I have reviewed this patient's social history and updated it with pertinent information if needed. Mark Milton Traaseth      Family History   I have reviewed this patient's family history and updated it with pertinent information if needed.   History reviewed. No pertinent family history.    Review of Systems   10 point ROS negative except noted above.    Physical Exam   Temp: 97.6  F (36.4  C) Temp src: Oral BP: (!) 175/81 Pulse: 77   Resp: 20 SpO2: 93 % O2 Device: None (Room air)    Vital Signs with Ranges  Temp:  [97.6  F (36.4  C)-97.7  F (36.5  C)] 97.6  F (36.4  C)  Pulse:  [68-87] 77  Resp:  [18-20] 20  BP: (118-189)/(72-91) 175/81  SpO2:  [93 %-94 %] 93 %  288 lbs 4.8 oz     , Blood pressure (!) 175/81, pulse 77, temperature 97.6  F (36.4  C), temperature source Oral, resp. rate 20, height 1.88 m (6' 2\"), weight 130.8 kg (288 lb 4.8 oz), SpO2 93%.  288 lbs 4.8 oz    NEUROLOGICAL EXAMINATION:   Mental status:  Alert and Oriented x 3, speech is fluent.  Motor:     Hip Flexor:                Right: 5/5  Left:  5/5  Hip Adductor:             Right:  5/5  Left:  5/5  Hip Abductor:             Right:  5/5  Left:  5/5  Gastroc Soleus:        Right:  5/5  Left:  5/5  Tib/Ant:                      Right:  5/5  Left:  5/5  EHL:                     Right:  5/5  Left:  5/5  Sensation:  decreased LLE  Reflexes:   Negative Babinski.  Negative Clonus.      Lumbar examination reveals no tenderness of the spine or paraspinous muscles.  Straight leg raise is minimally positive on the left.       Data     CBC RESULTS:   Recent Labs   Lab Test 05/25/24  0940   WBC 8.5   RBC 5.03   HGB 14.6   HCT 43.8   MCV 87   MCH 29.0   MCHC 33.3   RDW 14.3    " "    Basic Metabolic Panel:  Lab Results   Component Value Date     05/26/2024     04/25/2019      Lab Results   Component Value Date    POTASSIUM 5.4 05/26/2024    POTASSIUM 4.7 04/25/2019     Lab Results   Component Value Date    CHLORIDE 100 05/26/2024    CHLORIDE 102 04/25/2019     Lab Results   Component Value Date    BELA 9.6 05/26/2024    BELA 9.1 04/25/2019     Lab Results   Component Value Date    CO2 17 05/26/2024    CO2 28 04/25/2019     Lab Results   Component Value Date    BUN 33.8 05/26/2024    BUN 18 04/25/2019     Lab Results   Component Value Date    CR 1.45 05/26/2024    CR 1.04 04/25/2019     Lab Results   Component Value Date     05/26/2024     04/25/2019     INR:  No results found for: \"INR\"      "

## 2024-05-26 NOTE — PROGRESS NOTES
[facility] MEDICINE PROGRESS NOTE      Summary: 59 year old male into [facility] on 5/25/2024 after presenting for acute  Low back pain with left leg radiculopathy.  MRI imaging on admission showed a L3-4  Disc pathology causing recess stenosis and impingement on the left L4 nerve root.  Pt had no loss of function though was in a severe amount of pain causing admission.    Hospital course is marked by clinical support for his acute back and left leg pain.  He  Was given dexamethasone, iv and oral narcotics and valium. He has some urinary  Retention though has a history of BPH.  (No evidence on imaging of cauda equina).  A anderson was placed.    On interview this morning he looks more comfortable.  The blood pressure elevated.  He has an NUSRAT.  I dont have pain to consult on the weekend. Per my discussion with  Pharmacy I will schedule the dilaudid and add gabapentin tonight in hopes the iv  Dosing decreases.  If he does not achieve sufficient relief an epidural steroid  Injection will be considered.        Hospital day number 1    Problem list:     Acute low back pain with left leg radiculopathy: due to left L4 nerve compression from  Recess stenosis; continue with narcotics, no nsaids due to NUSRAT; scheduled tylenol,  Valium; no additional steroids today due to long half life; discussed with NS   HTN: norvasc 10 mg, hydralazine 100 mg twice daily, losartan 100 mg daily  DM2, insulin dependent:  continue home insulin (LA and SA)  CAD, history of stents: coreg at proper dosing of twice daily; baby aspirin for life  JANAK: pt does not use cpap  Urinary retention: anderson in place; add flomax  NUSRAT:  baseline 1.25 in 2023; now at 1.40; hold losartan, ivf, CTM  Dvt prevention:  scd        Shyann Elizondo MD  [facility] Medicine  [facility]  Phone: #[phone]  Securely message with the Vocera Web Console (learn more here)  Text page via [employer]      Interval History/Subjective:  my pain is not well controlled       Physical Exam/Objective:  Temp:  [97.4  F (36.3  C)-97.7  F (36.5  C)] 97.6  F (36.4  C)  Pulse:  [64-87] 87  Resp:  [18-20] 20  BP: (118-213)/(58-96) 118/91  SpO2:  [88 %-97 %] 93 %  Body mass index is 37.02 kg/m .    GENERAL:  Appears more comfortable than yesterday   HEAD:  Normocephalic, without obvious abnormality, atraumatic   EYES:  PERRL, conjunctiva/corneas clear, no scleral icterus, EOM's intact   NOSE: Nares normal, septum midline, mucosa normal, no drainage   THROAT: Lips, mucosa, and tongue normal; teeth and gums normal, mouth moist   NECK: Supple, symmetrical, trachea midline   BACK:   Symmetric, no curvature, ROM normal   LUNGS:   Clear to auscultation bilaterally, no rales, rhonchi, or wheezing, symmetric chest rise on inhalation, respirations unlabored   CHEST WALL:  No tenderness or deformity   HEART:  Regular rate and rhythm, S1 and S2 normal, no murmur, rub, or gallop    ABDOMEN:   Soft, non-tender, bowel sounds active all four quadrants, no masses, no organomegaly, no rebound or guarding   EXTREMITIES: Extremities normal, atraumatic, no cyanosis or edema    SKIN: Dry to touch, no exanthems in the visualized areas   NEURO: Alert, oriented x3, moves all four extremities freely; not gaited   PSYCH: Cooperative, behavior is appropriate      Data reviewed today: I personally reviewed all new medications, labs, imaging/diagnostics reports over the past 24 hours. Pertinent findings include:    Imaging:   Recent Results (from the past 24 hour(s))   Lumbar spine MRI w/o contrast    Narrative    EXAM: MR LUMBAR SPINE WITHOUT CONTRAST  LOCATION: [facility]  DATE: 05/25/2024    INDICATION: Low back pain, going down legs, incomplete bladder emptying.  COMPARISON: CT 07/22/2023.  TECHNIQUE: Routine Lumbar Spine MRI without IV contrast.    FINDINGS:   NOMENCLATURE: Five lumbar-type vertebral bodies.    ALIGNMENT:  Straightening of the normal lumbar lordosis. Mild L5-S1 retrolisthesis.    BONES: There is no evidence of a marrow-replacing process. There is no evidence of abnormal bony edema. Vertebral body heights are unremarkable. The visualized portions of the sacrum and amos appear intact.    DISCS: Moderate lower lumbar spondylosis described in further detail below.    SPINAL CORD: The conus terminates at L1. No signal abnormalities of the imaged cord or cauda equina nerve roots are demonstrated.    SPINAL CANAL: No high-grade stenosis.    PARASPINAL SOFT TISSUES: Unremarkable.    ABDOMINAL CAVITY: No acute abnormality demonstrated within technique limitations.    SIGNIFICANT FINDINGS BY LEVEL:    T12-L1: Normal disc height and signal. No herniation. Normal facets. No spinal canal or neural foraminal stenosis.     L1-L2: Normal disc height and signal. No herniation. Normal facets. No spinal canal or neural foraminal stenosis.    L2-L3: Normal disc height and signal. No herniation. Normal facets. No spinal canal or neural foraminal stenosis.     L3-L4: Mild intervertebral disc height loss. Disc desiccation. Shallow bulge. Inferiorly directed central/left paracentral extrusion. Mild facet arthropathy. Mild spinal canal stenosis. Moderate left lateral recess stenosis with impingement upon the   descending left L4 nerve root. Mild bilateral neural foraminal stenosis.    L4-L5: Moderate-to-advanced intervertebral disc height loss. Disc desiccation. Broad-based bulge and interbody spurring. Central disc protrusion. Mild facet arthropathy. Mild spinal canal stenosis. Mild right and mild left neural foraminal stenosis. Mild   facet arthropathy. Prior right hemilaminotomy.    L5-S1: Moderate-to-advanced intervertebral disc height loss. Disc desiccation. Broad-based disc-osteophyte complex. Mild right-sided facet arthropathy. No significant spinal canal stenosis. Mild right lateral recess stenosis. Moderate bilateral neural    foraminal stenosis.      Impression    IMPRESSION:  1.  Unremarkable cord and cauda equina nerve roots.  2.  No high-grade spinal canal stenosis.  3.  At L3-L4, there is a left-sided extrusion which contributes to left lateral recess stenosis and impingement upon the descending left L4 nerve.  4.  At L4-L5, there is a central disc herniation without high-grade stenosis of the spinal canal or neural foramina. Prior right hemilaminotomy.  5.  At L5-S1, there is moderate bilateral neural foraminal stenosis.           Labs:  Most Recent 3 BMP's:  Recent Labs   Lab Test 05/26/24  1142 05/26/24  0830 05/26/24  0554 05/25/24  1424 05/25/24  0940 04/25/19  1339   0000   NA  --   --  133*  --  139 134  --    POTASSIUM  --   --  5.4*  --  5.1 4.7  --    CHLORIDE  --   --  100  --  106 102  --    CO2  --   --  17*  --  23 28  --    BUN  --   --  33.8*  --  23.7* 18  --    CR  --   --  1.45*  --  1.40* 1.04  --    ANIONGAP  --   --  16*  --  10 4  --    BELA  --   --  9.6  --  9.2 9.1  --    * 207* 269*   < > 182* 125*   < >    < > = values in this interval not displayed.       Medications:   Personally Reviewed.  Medications   Current Facility-Administered Medications   Medication Dose Route Frequency Provider Last Rate Last Admin     Current Facility-Administered Medications   Medication Dose Route Frequency Provider Last Rate Last Admin    acetaminophen (TYLENOL) tablet 975 mg  975 mg Oral TID Shyann Elizondo MD   975 mg at 05/25/24 2024    allopurinol (ZYLOPRIM) tablet 100 mg  100 mg Oral Daily Shyann Elizondo MD   100 mg at 05/25/24 1454    amLODIPine (NORVASC) tablet 10 mg  10 mg Oral Daily Abran Gaspar MD        aspirin EC tablet 81 mg  81 mg Oral Daily Shyann Elizondo MD        buPROPion (WELLBUTRIN XL) 24 hr tablet 300 mg  300 mg Oral QAM Abran Gaspar MD        carvedilol (COREG) tablet 25 mg  25 mg Oral BID Shyann Elizondo MD        dexAMETHasone (DECADRON) injection 4 mg  4 mg  Intravenous Q6H Shyann Elizondo MD   4 mg at 05/26/24 0537    diazepam (VALIUM) injection 2.5 mg  2.5 mg Intravenous TID Shyann Elizondo MD   2.5 mg at 05/25/24 2024    hydrALAZINE (APRESOLINE) tablet 100 mg  100 mg Oral BID Abran Gaspar MD   100 mg at 05/25/24 2348    insulin aspart (NovoLOG) injection (RAPID ACTING)  1-10 Units Subcutaneous TID AC Shyann Elizondo MD        insulin aspart (NovoLOG) injection (RAPID ACTING)  1-7 Units Subcutaneous At Bedtime Shyann Elizondo MD        insulin glargine (LANTUS PEN) injection 100 Units  100 Units Subcutaneous At Bedtime Abran Gaspar MD   100 Units at 05/25/24 2348    levothyroxine (SYNTHROID/LEVOTHROID) tablet 50 mcg  50 mcg Oral Daily Shyann Elizondo MD        losartan (COZAAR) tablet 50 mg  50 mg Oral Daily Shyann Elizondo MD        pantoprazole (PROTONIX) EC tablet 40 mg  40 mg Oral Daily Shyann Elizondo MD

## 2024-05-26 NOTE — PROGRESS NOTES
05/26/24 1100   Appointment Info   Signing Clinician's Name / Credentials (PT) Uziel Vela PT   Living Environment   People in Home spouse   Current Living Arrangements house   Home Accessibility stairs within home;stairs to enter home  (4 stairs to enter, no railing, has pillars on both sides to allow for bracing)   Number of Stairs, Main Entrance greater than 10 stairs   Stair Railings, Main Entrance none   Number of Stairs, Within Home, Primary greater than 10 stairs   Transportation Anticipated family or friend will provide   Living Environment Comments 16 steps up and down; can live on 1st floor if needed. grandkids 3 (7,5,3)  (has two walkers, two w/c's, foot orthotic,)   Self-Care   Usual Activity Tolerance good   Current Activity Tolerance poor   Regular Exercise No   Equipment Currently Used at Home none   Fall history within last six months no   Activity/Exercise/Self-Care Comment wears orthotic for R LE; has bruising on RLE, seems healed, nursing aware. encouraged daily skin checks.   General Information   Onset of Illness/Injury or Date of Surgery 05/25/24   Referring Physician Shyann Elizondo MD   Patient/Family Therapy Goals Statement (PT) To move without pain   Pertinent History of Current Problem (include personal factors and/or comorbidities that impact the POC) acute low back pain   Existing Precautions/Restrictions no known precautions/restrictions   Cognition   Affect/Mental Status (Cognition) WFL   Pain Assessment   Patient Currently in Pain Yes, see Vital Sign flowsheet   Integumentary/Edema   Integumentary/Edema other (describe)  (R foot has bruising on plantar aspect)   Range of Motion (ROM)   Range of Motion ROM deficits secondary to pain   Strength (Manual Muscle Testing)   Strength (Manual Muscle Testing) Deficits observed during functional mobility   Bed Mobility   Bed Mobility rolling left;rolling right;sidelying-sit   Bed Mobility Limitations decreased ability to use legs for  bridging/pushing   Impairments Contributing to Impaired Bed Mobility pain   Assistive Device (Bed Mobility) bed rails   Transfers   Transfers sit-stand transfer   Maintains Weight-bearing Status (Transfers) able to maintain   Transfer Safety Concerns Noted decreased sequencing ability;other (see comments)  (prefers bent forward posture to accomodate back pain)   Impairments Contributing to Impaired Transfers pain   Sit-Stand Transfer   Sit-Stand [place] (Transfers) contact guard;verbal cues;1 person assist   Assistive Device (Sit-Stand Transfers) walker, front-wheeled   Comment, (Sit-Stand Transfer) verbal cues for hand placement   Gait/Stairs (Locomotion)   [place] Level (Gait) unable to assess  (only able to transfer from bed to chair)   Assistive Device (Gait) walker, front-wheeled   Balance   Balance other (describe)   Balance Comments R transmetatarsal amputation  (pt wears orthotic)   Sensory Examination   Sensory Perception other (describe)   Sensory Perception Comments BLE neuropathy   Clinical Impression   Criteria for Skilled Therapeutic Intervention Yes, treatment indicated   PT Diagnosis (PT) impaired functional mobility   Influenced by the following impairments pain, decreased ROM   Functional limitations due to impairments transfers, stairs, gait   Clinical Presentation (PT Evaluation Complexity) stable   Clinical Presentation Rationale patient presents as medically diagnosed   Clinical Decision Making (Complexity) low complexity   Planned Therapy Interventions (PT) gait training;home exercise program;ROM (range of motion);stair training;transfer training   Risk & Benefits of therapy have been explained patient;spouse/significant other   PT Total Evaluation Time   PT Eval, Low Complexity Minutes (59934) 15   Physical Therapy Goals   PT Frequency Daily   PT Predicted Duration/Target Date for Goal Attainment 05/29/24   PT Goals Transfers;Gait;Stairs   PT: Transfers Modified independent;Sit  to/from stand;Assistive device   PT: Gait Modified independent;Assistive device;Greater than 200 feet   PT: Stairs Modified independent;Assistive device;Rail on right;Greater than 10 stairs   Interventions   Interventions Quick Adds Therapeutic Activity;Therapeutic Procedure   Therapeutic Procedure/Exercise   Ther. Procedure: strength, endurance, ROM, flexibillity Minutes (96535) 15   Symptoms Noted During/After Treatment fatigue   Treatment Detail/Skilled Intervention supine ther ex: glute squeeze x 10, quad set x 10, heel slides x 5 BLE (LLE weaker), RLE SLR x 5, unable to SLR on L, hip ABD/ADD BLE (shorter ROM for LLE). Verbal cues for breathing throughout, matching breathing with movement. Tactile cues for technique. Education on acute low back pain and staying within pain free ROM until able to move further; encouraged to keep mobilizing with staff as much as possible.   Therapeutic Activity   Therapeutic Activities: dynamic activities to improve functional performance Minutes (12705) 10   Symptoms Noted During/After Treatment Increased pain   Treatment Detail/Skilled Intervention log roll to left side of bed, supervision A, verbal cues for hand placement and breathing as needed, min A to complete trunk upright. sit to stand from EOB x 2, bed raised to limit pain, using FWW, CGA, verbal cues for hand placement. Stand pivot transfer with FWW, verbal cues throughout for AD mgmt and foot placement. Patient able to tolerate sitting up in chair for ~5 min before requesting to transfer back to bed. sit to stand from bedside chair using FWW, CGA, verbal cues for hand and foot placement. Pt positioned to comfort using pillows.   PT Discharge Planning   PT Plan LE ROM, supine/sitting ther ex as able, progress transfers, progress gait, stairs   PT Discharge Recommendation (DC Rec) home with outpatient physical therapy   PT Rationale for DC Rec patient below baseline mobility, has supportive spouse and family to assist as  needed.   PT Brief overview of current status patient unable to ambulate safely due to high pain, transfers CGA to min A using FWW.  (patient can live on first floor for short period if needed.)   Total Session Time   Timed Code Treatment Minutes 25   Total Session Time (sum of timed and untimed services) 40

## 2024-05-26 NOTE — PROVIDER NOTIFICATION
Discussed with Dr. Correa (Interventional Radiology) via phone:     Ok for patient to continue scheduled Aspirin even on day of scheduled JUAN.

## 2024-05-26 NOTE — PROGRESS NOTES
Remote Xcover ---  RN relays patients concerns of needing his lantus 100 units at bedtime, given steroid use    Plans --  Will resume lantus. No surgical plans noted. Given BP is high -- I resumed his Novasc and Hydralazine with Holding parameters. I did resume bupropion.     I did resume his coreg as once a day for now, defer to AM doc to change to BID dosing -- he reports to Pharm, he takes only once a day given adverse effects, though supposed to be BID)     Also there is a torsemide --- I am not able to resume this given elev creatinine and we have no recent to compare ---- defer to AM doc as well. Not sure of this is chronic or acute     He also takes several vitamins/supplements  -- defer to AM doc if appropriate to resume     There is no PRN po pain meds, will put one as well x2 doses.

## 2024-05-26 NOTE — PROGRESS NOTES
PRIMARY DIAGNOSIS: ACUTE PAIN  OUTPATIENT/OBSERVATION GOALS TO BE MET BEFORE DISCHARGE:  1. Pain Status: Improved but still requiring IV narcotics.    2. Return to near baseline physical activity: No    3. Cleared for discharge by consultants (if involved): No, Neurosurgery consult (5/26)    Discharge Planner Nurse   Safe discharge environment identified: No  Barriers to discharge: Yes       Entered by: Catrina White RN 05/26/2024 12:40 AM     Please review provider order for any additional goals.   Nurse to notify provider when observation goals have been met and patient is ready for discharge.

## 2024-05-26 NOTE — PROGRESS NOTES
PRIMARY DIAGNOSIS: ACUTE PAIN  OUTPATIENT/OBSERVATION GOALS TO BE MET BEFORE DISCHARGE:  1. Pain Status: Improved but still requiring IV narcotics.    2. Return to near baseline physical activity: No - PT consulted for exercises/mobility. Nursing staff to continue/encourage mobility.     3. Cleared for discharge by consultants (if involved): No    Discharge Planner Nurse   Safe discharge environment identified:  Patient wants to discharge home.   Barriers to discharge: Yes - Possible JUAN on Tuesday 5/28.        Entered by: Contreras Alfaro RN 05/26/2024 2:41 PM     Please review provider order for any additional goals.   Nurse to notify provider when observation goals have been met and patient is ready for discharge.

## 2024-05-26 NOTE — PROGRESS NOTES
PRIMARY DIAGNOSIS: ACUTE PAIN  OUTPATIENT/OBSERVATION GOALS TO BE MET BEFORE DISCHARGE:  1. Pain Status: Improved but still requiring IV narcotics.    2. Return to near baseline physical activity: No    3. Cleared for discharge by consultants (if involved): No, Neurosurgery consult    Discharge Planner Nurse   Safe discharge environment identified: No  Barriers to discharge: Yes       Entered by: Catrina White RN 05/26/2024 4:47 AM  Patient BP elevated given Hydralazine, slightly improved. Patient very itchy, given benedryl, some relief, given hydroxyzine with good effect, appears asleep, c/o back pain. Pain controlled with IV/PO dilaudid.  Please review provider order for any additional goals.   Nurse to notify provider when observation goals have been met and patient is ready for discharge.

## 2024-05-27 LAB
ANION GAP SERPL CALCULATED.3IONS-SCNC: 10 MMOL/L (ref 7–15)
BUN SERPL-MCNC: 47.7 MG/DL (ref 6–20)
CALCIUM SERPL-MCNC: 9.4 MG/DL (ref 8.6–10)
CHLORIDE SERPL-SCNC: 106 MMOL/L (ref 98–107)
CREAT SERPL-MCNC: 1.4 MG/DL (ref 0.67–1.17)
DEPRECATED HCO3 PLAS-SCNC: 21 MMOL/L (ref 22–29)
EGFRCR SERPLBLD CKD-EPI 2021: 59 ML/MIN/1.73M2
GLUCOSE BLDC GLUCOMTR-MCNC: 195 MG/DL (ref 70–99)
GLUCOSE BLDC GLUCOMTR-MCNC: 211 MG/DL (ref 70–99)
GLUCOSE BLDC GLUCOMTR-MCNC: 216 MG/DL (ref 70–99)
GLUCOSE BLDC GLUCOMTR-MCNC: 340 MG/DL (ref 70–99)
GLUCOSE SERPL-MCNC: 232 MG/DL (ref 70–99)
POTASSIUM SERPL-SCNC: 5.5 MMOL/L (ref 3.4–5.3)
SODIUM SERPL-SCNC: 137 MMOL/L (ref 135–145)

## 2024-05-27 PROCEDURE — 99233 SBSQ HOSP IP/OBS HIGH 50: CPT | Performed by: EMERGENCY MEDICINE

## 2024-05-27 PROCEDURE — 120N000001 HC R&B MED SURG/OB

## 2024-05-27 PROCEDURE — 99207 PR CDG-CUT & PASTE-POTENTIAL IMPACT ON LEVEL: CPT | Performed by: EMERGENCY MEDICINE

## 2024-05-27 PROCEDURE — 96376 TX/PRO/DX INJ SAME DRUG ADON: CPT

## 2024-05-27 PROCEDURE — 36415 COLL VENOUS BLD VENIPUNCTURE: CPT | Performed by: EMERGENCY MEDICINE

## 2024-05-27 PROCEDURE — G0378 HOSPITAL OBSERVATION PER HR: HCPCS

## 2024-05-27 PROCEDURE — 250N000011 HC RX IP 250 OP 636: Performed by: EMERGENCY MEDICINE

## 2024-05-27 PROCEDURE — 258N000003 HC RX IP 258 OP 636: Performed by: EMERGENCY MEDICINE

## 2024-05-27 PROCEDURE — 82962 GLUCOSE BLOOD TEST: CPT

## 2024-05-27 PROCEDURE — 250N000013 HC RX MED GY IP 250 OP 250 PS 637: Performed by: INTERNAL MEDICINE

## 2024-05-27 PROCEDURE — 250N000013 HC RX MED GY IP 250 OP 250 PS 637: Performed by: EMERGENCY MEDICINE

## 2024-05-27 PROCEDURE — 80048 BASIC METABOLIC PNL TOTAL CA: CPT | Performed by: EMERGENCY MEDICINE

## 2024-05-27 RX ORDER — KETOROLAC TROMETHAMINE 15 MG/ML
15 INJECTION, SOLUTION INTRAMUSCULAR; INTRAVENOUS 4 TIMES DAILY
Status: DISCONTINUED | OUTPATIENT
Start: 2024-05-27 | End: 2024-05-28 | Stop reason: ALTCHOICE

## 2024-05-27 RX ORDER — GABAPENTIN 300 MG/1
300 CAPSULE ORAL 3 TIMES DAILY
Status: DISCONTINUED | OUTPATIENT
Start: 2024-05-27 | End: 2024-05-28 | Stop reason: ALTCHOICE

## 2024-05-27 RX ORDER — HYDROMORPHONE HYDROCHLORIDE 2 MG/1
2 TABLET ORAL EVERY 4 HOURS PRN
Status: DISCONTINUED | OUTPATIENT
Start: 2024-05-27 | End: 2024-05-30 | Stop reason: HOSPADM

## 2024-05-27 RX ORDER — ACETAMINOPHEN 325 MG/1
975 TABLET ORAL 4 TIMES DAILY
Status: DISCONTINUED | OUTPATIENT
Start: 2024-05-27 | End: 2024-05-30 | Stop reason: HOSPADM

## 2024-05-27 RX ORDER — HYDRALAZINE HYDROCHLORIDE 25 MG/1
100 TABLET, FILM COATED ORAL EVERY 8 HOURS SCHEDULED
Status: DISCONTINUED | OUTPATIENT
Start: 2024-05-27 | End: 2024-05-30 | Stop reason: HOSPADM

## 2024-05-27 RX ORDER — GABAPENTIN 300 MG/1
300 CAPSULE ORAL 2 TIMES DAILY
Status: DISCONTINUED | OUTPATIENT
Start: 2024-05-27 | End: 2024-05-27

## 2024-05-27 RX ADMIN — DIPHENHYDRAMINE HYDROCHLORIDE 25 MG: 50 INJECTION, SOLUTION INTRAMUSCULAR; INTRAVENOUS at 05:06

## 2024-05-27 RX ADMIN — HYDRALAZINE HYDROCHLORIDE 100 MG: 50 TABLET, FILM COATED ORAL at 07:24

## 2024-05-27 RX ADMIN — PANTOPRAZOLE SODIUM 40 MG: 40 TABLET, DELAYED RELEASE ORAL at 07:20

## 2024-05-27 RX ADMIN — INSULIN GLARGINE 100 UNITS: 100 INJECTION, SOLUTION SUBCUTANEOUS at 21:42

## 2024-05-27 RX ADMIN — DEXAMETHASONE SODIUM PHOSPHATE 4 MG: 4 INJECTION, SOLUTION INTRAMUSCULAR; INTRAVENOUS at 06:19

## 2024-05-27 RX ADMIN — LEVOTHYROXINE SODIUM 50 MCG: 0.05 TABLET ORAL at 07:21

## 2024-05-27 RX ADMIN — HYDROMORPHONE HYDROCHLORIDE 3 MG: 2 TABLET ORAL at 03:06

## 2024-05-27 RX ADMIN — ACETAMINOPHEN 975 MG: 325 TABLET ORAL at 21:19

## 2024-05-27 RX ADMIN — CARVEDILOL 25 MG: 25 TABLET, FILM COATED ORAL at 17:21

## 2024-05-27 RX ADMIN — DEXAMETHASONE SODIUM PHOSPHATE 4 MG: 4 INJECTION, SOLUTION INTRAMUSCULAR; INTRAVENOUS at 11:52

## 2024-05-27 RX ADMIN — GABAPENTIN 300 MG: 300 CAPSULE ORAL at 14:32

## 2024-05-27 RX ADMIN — HYDROMORPHONE HYDROCHLORIDE 1 MG: 1 INJECTION, SOLUTION INTRAMUSCULAR; INTRAVENOUS; SUBCUTANEOUS at 09:49

## 2024-05-27 RX ADMIN — HYDRALAZINE HYDROCHLORIDE 100 MG: 50 TABLET, FILM COATED ORAL at 21:19

## 2024-05-27 RX ADMIN — BUPROPION HYDROCHLORIDE 300 MG: 150 TABLET, EXTENDED RELEASE ORAL at 07:20

## 2024-05-27 RX ADMIN — ACETAMINOPHEN 975 MG: 325 TABLET ORAL at 12:06

## 2024-05-27 RX ADMIN — HYDROMORPHONE HYDROCHLORIDE 3 MG: 2 TABLET ORAL at 14:31

## 2024-05-27 RX ADMIN — AMLODIPINE BESYLATE 10 MG: 10 TABLET ORAL at 07:20

## 2024-05-27 RX ADMIN — HYDROMORPHONE HYDROCHLORIDE 3 MG: 2 TABLET ORAL at 21:19

## 2024-05-27 RX ADMIN — SODIUM CHLORIDE: 9 INJECTION, SOLUTION INTRAVENOUS at 06:19

## 2024-05-27 RX ADMIN — DIAZEPAM 2.5 MG: 5 TABLET ORAL at 21:19

## 2024-05-27 RX ADMIN — SENNOSIDES AND DOCUSATE SODIUM 2 TABLET: 50; 8.6 TABLET ORAL at 07:21

## 2024-05-27 RX ADMIN — DIPHENHYDRAMINE HYDROCHLORIDE 25 MG: 25 CAPSULE ORAL at 09:49

## 2024-05-27 RX ADMIN — DIAZEPAM 2.5 MG: 5 TABLET ORAL at 14:32

## 2024-05-27 RX ADMIN — DEXAMETHASONE SODIUM PHOSPHATE 4 MG: 4 INJECTION, SOLUTION INTRAMUSCULAR; INTRAVENOUS at 23:56

## 2024-05-27 RX ADMIN — HYDROXYZINE HYDROCHLORIDE 25 MG: 25 TABLET ORAL at 08:41

## 2024-05-27 RX ADMIN — HYDROMORPHONE HYDROCHLORIDE 1 MG: 1 INJECTION, SOLUTION INTRAMUSCULAR; INTRAVENOUS; SUBCUTANEOUS at 07:21

## 2024-05-27 RX ADMIN — KETOROLAC TROMETHAMINE 15 MG: 15 INJECTION, SOLUTION INTRAMUSCULAR; INTRAVENOUS at 12:05

## 2024-05-27 RX ADMIN — HYDROMORPHONE HYDROCHLORIDE 3 MG: 2 TABLET ORAL at 08:41

## 2024-05-27 RX ADMIN — HYDROMORPHONE HYDROCHLORIDE 0.5 MG: 1 INJECTION, SOLUTION INTRAMUSCULAR; INTRAVENOUS; SUBCUTANEOUS at 05:06

## 2024-05-27 RX ADMIN — HYDROMORPHONE HYDROCHLORIDE 1 MG: 1 INJECTION, SOLUTION INTRAMUSCULAR; INTRAVENOUS; SUBCUTANEOUS at 11:52

## 2024-05-27 RX ADMIN — KETOROLAC TROMETHAMINE 15 MG: 15 INJECTION, SOLUTION INTRAMUSCULAR; INTRAVENOUS at 21:18

## 2024-05-27 RX ADMIN — DEXAMETHASONE SODIUM PHOSPHATE 4 MG: 4 INJECTION, SOLUTION INTRAMUSCULAR; INTRAVENOUS at 17:21

## 2024-05-27 RX ADMIN — GABAPENTIN 300 MG: 300 CAPSULE ORAL at 09:49

## 2024-05-27 RX ADMIN — ALLOPURINOL 100 MG: 100 TABLET ORAL at 07:21

## 2024-05-27 RX ADMIN — TAMSULOSIN HYDROCHLORIDE 0.4 MG: 0.4 CAPSULE ORAL at 08:41

## 2024-05-27 RX ADMIN — CARVEDILOL 25 MG: 25 TABLET, FILM COATED ORAL at 07:20

## 2024-05-27 RX ADMIN — ACETAMINOPHEN 975 MG: 325 TABLET ORAL at 07:19

## 2024-05-27 RX ADMIN — DEXAMETHASONE SODIUM PHOSPHATE 4 MG: 4 INJECTION, SOLUTION INTRAMUSCULAR; INTRAVENOUS at 01:00

## 2024-05-27 RX ADMIN — GABAPENTIN 300 MG: 300 CAPSULE ORAL at 21:19

## 2024-05-27 RX ADMIN — ASPIRIN 81 MG: 81 TABLET, COATED ORAL at 07:20

## 2024-05-27 RX ADMIN — DIAZEPAM 2.5 MG: 5 TABLET ORAL at 08:41

## 2024-05-27 ASSESSMENT — ACTIVITIES OF DAILY LIVING (ADL)
ADLS_ACUITY_SCORE: 35
ADLS_ACUITY_SCORE: 23
TOILETING_ISSUES: NO
ADLS_ACUITY_SCORE: 35
ADLS_ACUITY_SCORE: 35
ADLS_ACUITY_SCORE: 36
ADLS_ACUITY_SCORE: 36
ADLS_ACUITY_SCORE: 22
ADLS_ACUITY_SCORE: 35
WALKING_OR_CLIMBING_STAIRS_DIFFICULTY: NO
ADLS_ACUITY_SCORE: 22
ADLS_ACUITY_SCORE: 35
ADLS_ACUITY_SCORE: 22
ADLS_ACUITY_SCORE: 22
CONCENTRATING,_REMEMBERING_OR_MAKING_DECISIONS_DIFFICULTY: NO
ADLS_ACUITY_SCORE: 36
ADLS_ACUITY_SCORE: 35
ADLS_ACUITY_SCORE: 23
DOING_ERRANDS_INDEPENDENTLY_DIFFICULTY: NO
ADLS_ACUITY_SCORE: 36
FALL_HISTORY_WITHIN_LAST_SIX_MONTHS: NO
ADLS_ACUITY_SCORE: 36
WEAR_GLASSES_OR_BLIND: NO
ADLS_ACUITY_SCORE: 35
ADLS_ACUITY_SCORE: 23
ADLS_ACUITY_SCORE: 36
ADLS_ACUITY_SCORE: 35
DRESSING/BATHING_DIFFICULTY: NO
CHANGE_IN_FUNCTIONAL_STATUS_SINCE_ONSET_OF_CURRENT_ILLNESS/INJURY: NO
DIFFICULTY_EATING/SWALLOWING: NO
ADLS_ACUITY_SCORE: 22
DIFFICULTY_COMMUNICATING: NO
ADLS_ACUITY_SCORE: 36

## 2024-05-27 NOTE — PROGRESS NOTES
[facility]    Neurosurgery Progress Note  Chart Check    Assessment & Plan   Mark Milton Traaseth is a 59 year old male with a history of prior L4-5 decompression 20 years ago who presented to the ED with a 2 day history of back and leg pain. Imaging revealed a left-sided disc extrusion contributing to left lateral recess stenosis and impingement upon the descending left L4 nerve.     Plan:  -Left L3-4 transforamainal JUAN ordered with IR, likely not able to complete until Tuesday at the earliest  -NPO at midnight due to possible JUAN tomorrow, timing as determined by IR  -Continue pain management per primary service  -Appreciate assistance from hospitalist regarding blood sugars on steroids  -Therapy as tolerated  -No imaging findings to correlate with urinary retention  -Neurosurgery will continue to chart check and re-evaluate in person after JUAN  -Please contact neurosurgery with any new/worsening symptoms and/or with questions or concerns.    I have discussed the following assessment and plan Dr. Lind who is in agreement with initial plan and will follow up with further consultation recommendations.    Ana Paula Melton CNP  [facility] Neurosurgery  [address] [address]    Tel [phone]  Text page via [facility] [employer] [facility] [employer] Paging/Directory

## 2024-05-27 NOTE — PLAN OF CARE
Problem: Pain Acute  Goal: Optimal Pain Control and Function  Outcome: Progressing   Goal Outcome Evaluation:             Patient frequently complaining of pain 7/10 lower back radiating to left leg. Pain aged with scheduled and PRN dilaudid, Toradol, dexamethasone, gabapentin, and Tylenol.  Patient will be NPO at midnight for planned JUAN tomorrow.  Jerod Eden RN  5/27/2024  5:15 PM

## 2024-05-27 NOTE — PROGRESS NOTES
Pt still in pain especially with movement while in bed.  PRIMARY DIAGNOSIS: ACUTE PAIN  OUTPATIENT/OBSERVATION GOALS TO BE MET BEFORE DISCHARGE:  1. Pain Status: Improved but still requiring IV narcotics.    2. Return to near baseline physical activity: No    3. Cleared for discharge by consultants (if involved): No    Discharge Planner Nurse   Safe discharge environment identified: Yes  Barriers to discharge: Yes        Entered by: Timothy Rubio RN 05/27/2024 6:43 AM

## 2024-05-27 NOTE — PROVIDER NOTIFICATION
Spoke with Dr. Lanier (Interventional Radiology) 10:22 AM 05/27/24:     Ok for patient to receive IV Toradol today until midnight. Patient should not receive Toradol on (5/28) before potential JUAN.     Updated Dr. Elizondo via Vocera messaging.

## 2024-05-27 NOTE — PROGRESS NOTES
[facility] MEDICINE PROGRESS NOTE      Summary: 59 year old male into [facility] on 5/25/2024 after presenting for acute  Low back pain with left leg radiculopathy.  MRI imaging on admission showed a L3-4  Disc pathology causing recess stenosis and impingement on the left L4 nerve root.  Pt had no loss of function though was in a severe amount of pain causing admission.    Hospital course is marked by clinical support for his acute back and left leg pain.  He  Was given dexamethasone, iv and oral narcotics and valium. He has some urinary  Retention though has a history of BPH.  (No evidence on imaging of cauda equina).  A anderson was placed.      5/26:  difficult to control pain; will change from as needed to   Scheduled dilaudid; glucose running high with steroids    5/27:  at 0830, he just got up and now has miserable pain in the   Left leg again;  case discussed with pain (on the side)   Will add as needed oral dilaudid; up the tylenol dosing   Increase the gabapentin (temporarily) until JUAN and   Pain improves; insulin changed again (will add AM   Lantus ) in hopes of decreasing the short acting   Insulin; hope to have JUAN tomorrow      Hospital day number 2    Problem list:     Acute low back pain with left leg radiculopathy: due to left L4 nerve compression from  Recess stenosis; continue with narcotics,   HTN: norvasc 10 mg, hydralazine 100 mg twice daily, losartan  Now on hold; increase the hydralazine to 3 times daily  DM2, insulin dependent:  continue home insulin (LA and SA)  CAD, history of stents: coreg at proper dosing of twice daily; baby aspirin for life  JANAK: pt does not use cpap  Urinary retention: anderson in place; add flomax; (no cauda )  NUSRAT:  baseline 1.25 in 2023; now at 1.40; hold losartan, ivf, CTM  Dvt prevention:  scd        Shyann Elizondo MD  [facility]  [facility]  Phone: #[phone]  Securely message with the  [employer] Web Console (learn more here)  Text page via [employer] Paging/Directory     Interval History/Subjective: lying on right hip;       Physical Exam/Objective:  Temp:  [97.3  F (36.3  C)-98.1  F (36.7  C)] 97.4  F (36.3  C)  Pulse:  [76-82] 77  Resp:  [18-22] 20  BP: (162-188)/(67-91) 166/72  SpO2:  [94 %-97 %] 96 %  Body mass index is 37.66 kg/m .    GENERAL:  +++ pain on left leg   HEAD:  Normocephalic, without obvious abnormality, atraumatic   EYES:  PERRL, conjunctiva/corneas clear, no scleral icterus, EOM's intact   NOSE: Nares normal, septum midline, mucosa normal, no drainage   THROAT: Lips, mucosa, and tongue normal; teeth and gums normal, mouth moist   NECK: Supple, symmetrical, trachea midline   BACK:   Symmetric, no curvature, ROM normal   LUNGS:   Clear to auscultation bilaterally, no rales, rhonchi, or wheezing, symmetric chest rise on inhalation, respirations unlabored   CHEST WALL:  No tenderness or deformity   HEART:  Regular rate and rhythm, S1 and S2 normal, no murmur, rub, or gallop    ABDOMEN:   Soft, non-tender, bowel sounds active all four quadrants, no masses, no organomegaly, no rebound or guarding   EXTREMITIES: Extremities normal, atraumatic, no cyanosis or edema    SKIN: Dry to touch, no exanthems in the visualized areas   NEURO: Alert, oriented x3, moves all four extremities freely; not gaited   PSYCH: Cooperative, behavior is appropriate      Data reviewed today: I personally reviewed all new medications, labs, imaging/diagnostics reports over the past 24 hours. Pertinent findings include:    Imaging:   No results found for this or any previous visit (from the past 24 hour(s)).      Labs:  Most Recent 3 BMP's:  Recent Labs   Lab Test 05/27/24  1144 05/27/24  1022 05/27/24  0628 05/26/24  0830 05/26/24  0554 05/25/24  1424 05/25/24  0940   NA  --  137  --   --  133*  --  139   POTASSIUM  --  5.5*  --   --  5.4*  --  5.1   CHLORIDE  --  106  --   --  100  --  106   CO2  --  21*  --   --   17*  --  23   BUN  --  47.7*  --   --  33.8*  --  23.7*   CR  --  1.40*  --   --  1.45*  --  1.40*   ANIONGAP  --  10  --   --  16*  --  10   BELA  --  9.4  --   --  9.6  --  9.2   * 232* 340*   < > 269*   < > 182*    < > = values in this interval not displayed.       Medications:   Personally Reviewed.  Medications   Current Facility-Administered Medications   Medication Dose Route Frequency Provider Last Rate Last Admin     Current Facility-Administered Medications   Medication Dose Route Frequency Provider Last Rate Last Admin    acetaminophen (TYLENOL) tablet 975 mg  975 mg Oral 4x Daily Shyann Elizondo MD   975 mg at 05/27/24 1206    allopurinol (ZYLOPRIM) tablet 100 mg  100 mg Oral Daily Shyann Elizondo MD   100 mg at 05/27/24 0721    amLODIPine (NORVASC) tablet 10 mg  10 mg Oral Daily Abran Gaspar MD   10 mg at 05/27/24 0720    aspirin EC tablet 81 mg  81 mg Oral Daily Shyann Elizondo MD   81 mg at 05/27/24 0720    buPROPion (WELLBUTRIN XL) 24 hr tablet 300 mg  300 mg Oral QAM Abran Gaspar MD   300 mg at 05/27/24 0720    carvedilol (COREG) tablet 25 mg  25 mg Oral BID w/meals Shyann Elizondo MD   25 mg at 05/27/24 0720    dexAMETHasone (DECADRON) injection 4 mg  4 mg Intravenous Q6H Shyann Elizondo MD   4 mg at 05/27/24 1152    diazepam (VALIUM) half-tab 2.5 mg  2.5 mg Oral TID Shyann Elizondo MD   2.5 mg at 05/27/24 1432    gabapentin (NEURONTIN) capsule 300 mg  300 mg Oral TID Shyann Elizondo MD   300 mg at 05/27/24 1432    hydrALAZINE (APRESOLINE) tablet 100 mg  100 mg Oral Q8H [facility] Shyann Elizondo MD        HYDROmorphone (DILAUDID) half-tab 3 mg  3 mg Oral Q6H Shyann Elizondo MD   3 mg at 05/27/24 1431    [START ON 5/28/2024] insulin aspart (NovoLOG) injection (RAPID ACTING)   Subcutaneous Daily with breakfast Shyann Elizondo MD        insulin aspart (NovoLOG) injection (RAPID ACTING)   Subcutaneous Daily with lunch Shyann Elizondo MD   20 Units at  05/27/24 1427    insulin aspart (NovoLOG) injection (RAPID ACTING)   Subcutaneous Daily with supper Shyann Elizondo MD        insulin glargine (LANTUS PEN) injection 100 Units  100 Units Subcutaneous At Bedtime Abran Gaspar MD   100 Units at 05/26/24 2055    insulin glargine (LANTUS PEN) injection 20 Units  20 Units Subcutaneous QAM Shyann Elizondo MD   20 Units at 05/27/24 0949    ketorolac (TORADOL) injection 15 mg  15 mg Intravenous 4x Daily Shyann Elizondo MD   15 mg at 05/27/24 1205    levothyroxine (SYNTHROID/LEVOTHROID) tablet 50 mcg  50 mcg Oral Daily Shyann Elizondo MD   50 mcg at 05/27/24 0721    [Held by provider] losartan (COZAAR) tablet 50 mg  50 mg Oral Daily Shyann Elizondo MD   50 mg at 05/26/24 0836    pantoprazole (PROTONIX) EC tablet 40 mg  40 mg Oral Daily Shyann Elizondo MD   40 mg at 05/27/24 0720    tamsulosin (FLOMAX) capsule 0.4 mg  0.4 mg Oral Daily Shyann Elizondo MD   0.4 mg at 05/27/24 0841

## 2024-05-27 NOTE — PROGRESS NOTES
PRIMARY DIAGNOSIS: ACUTE PAIN  OUTPATIENT/OBSERVATION GOALS TO BE MET BEFORE DISCHARGE:  1. Pain Status: Improved but still requiring IV narcotics.    2. Return to near baseline physical activity: No    3. Cleared for discharge by consultants (if involved): No    Discharge Planner Nurse   Safe discharge environment identified: Yes  Barriers to discharge: Yes        Entered by: Timothy Rubio RN 05/26/2024 10:27 PM

## 2024-05-28 ENCOUNTER — APPOINTMENT (OUTPATIENT)
Dept: INTERVENTIONAL RADIOLOGY/VASCULAR | Facility: CLINIC | Age: 59
DRG: 552 | End: 2024-05-28
Attending: NURSE PRACTITIONER
Payer: COMMERCIAL

## 2024-05-28 LAB
ANION GAP SERPL CALCULATED.3IONS-SCNC: 8 MMOL/L (ref 7–15)
BUN SERPL-MCNC: 52.7 MG/DL (ref 6–20)
CALCIUM SERPL-MCNC: 9.7 MG/DL (ref 8.6–10)
CHLORIDE SERPL-SCNC: 106 MMOL/L (ref 98–107)
CREAT SERPL-MCNC: 1.34 MG/DL (ref 0.67–1.17)
DEPRECATED HCO3 PLAS-SCNC: 23 MMOL/L (ref 22–29)
EGFRCR SERPLBLD CKD-EPI 2021: 62 ML/MIN/1.73M2
GLUCOSE BLDC GLUCOMTR-MCNC: 189 MG/DL (ref 70–99)
GLUCOSE BLDC GLUCOMTR-MCNC: 197 MG/DL (ref 70–99)
GLUCOSE BLDC GLUCOMTR-MCNC: 212 MG/DL (ref 70–99)
GLUCOSE BLDC GLUCOMTR-MCNC: 212 MG/DL (ref 70–99)
GLUCOSE BLDC GLUCOMTR-MCNC: 237 MG/DL (ref 70–99)
GLUCOSE SERPL-MCNC: 228 MG/DL (ref 70–99)
POTASSIUM SERPL-SCNC: 5.1 MMOL/L (ref 3.4–5.3)
SODIUM SERPL-SCNC: 137 MMOL/L (ref 135–145)

## 2024-05-28 PROCEDURE — 64483 NJX AA&/STRD TFRM EPI L/S 1: CPT | Mod: 50

## 2024-05-28 PROCEDURE — 250N000011 HC RX IP 250 OP 636: Performed by: RADIOLOGY

## 2024-05-28 PROCEDURE — 80048 BASIC METABOLIC PNL TOTAL CA: CPT | Performed by: EMERGENCY MEDICINE

## 2024-05-28 PROCEDURE — 120N000001 HC R&B MED SURG/OB

## 2024-05-28 PROCEDURE — 99233 SBSQ HOSP IP/OBS HIGH 50: CPT | Performed by: EMERGENCY MEDICINE

## 2024-05-28 PROCEDURE — 250N000013 HC RX MED GY IP 250 OP 250 PS 637: Performed by: INTERNAL MEDICINE

## 2024-05-28 PROCEDURE — 250N000009 HC RX 250: Performed by: RADIOLOGY

## 2024-05-28 PROCEDURE — 250N000013 HC RX MED GY IP 250 OP 250 PS 637: Performed by: EMERGENCY MEDICINE

## 2024-05-28 PROCEDURE — 3E0R33Z INTRODUCTION OF ANTI-INFLAMMATORY INTO SPINAL CANAL, PERCUTANEOUS APPROACH: ICD-10-PCS | Performed by: RADIOLOGY

## 2024-05-28 PROCEDURE — 250N000011 HC RX IP 250 OP 636: Performed by: EMERGENCY MEDICINE

## 2024-05-28 PROCEDURE — 36415 COLL VENOUS BLD VENIPUNCTURE: CPT | Performed by: EMERGENCY MEDICINE

## 2024-05-28 PROCEDURE — BR191ZZ FLUOROSCOPY OF LUMBAR SPINE USING LOW OSMOLAR CONTRAST: ICD-10-PCS | Performed by: RADIOLOGY

## 2024-05-28 PROCEDURE — 250N000012 HC RX MED GY IP 250 OP 636 PS 637: Performed by: INTERNAL MEDICINE

## 2024-05-28 RX ORDER — DEXAMETHASONE SODIUM PHOSPHATE 10 MG/ML
10 INJECTION, SOLUTION INTRAMUSCULAR; INTRAVENOUS ONCE
Status: COMPLETED | OUTPATIENT
Start: 2024-05-28 | End: 2024-05-28

## 2024-05-28 RX ORDER — POLYETHYLENE GLYCOL 3350 17 G/17G
17 POWDER, FOR SOLUTION ORAL 2 TIMES DAILY
Status: DISCONTINUED | OUTPATIENT
Start: 2024-05-28 | End: 2024-05-30 | Stop reason: HOSPADM

## 2024-05-28 RX ORDER — PREGABALIN 50 MG/1
50 CAPSULE ORAL 3 TIMES DAILY
Status: DISCONTINUED | OUTPATIENT
Start: 2024-05-28 | End: 2024-05-30 | Stop reason: HOSPADM

## 2024-05-28 RX ADMIN — DIAZEPAM 2.5 MG: 5 TABLET ORAL at 09:31

## 2024-05-28 RX ADMIN — DEXAMETHASONE SODIUM PHOSPHATE 10 MG: 10 INJECTION INTRAMUSCULAR; INTRAVENOUS at 15:33

## 2024-05-28 RX ADMIN — LEVOTHYROXINE SODIUM 50 MCG: 0.05 TABLET ORAL at 09:32

## 2024-05-28 RX ADMIN — ACETAMINOPHEN 975 MG: 325 TABLET ORAL at 09:30

## 2024-05-28 RX ADMIN — HYDRALAZINE HYDROCHLORIDE 100 MG: 50 TABLET, FILM COATED ORAL at 06:40

## 2024-05-28 RX ADMIN — GABAPENTIN 300 MG: 300 CAPSULE ORAL at 09:32

## 2024-05-28 RX ADMIN — ASPIRIN 81 MG: 81 TABLET, COATED ORAL at 09:32

## 2024-05-28 RX ADMIN — TAMSULOSIN HYDROCHLORIDE 0.4 MG: 0.4 CAPSULE ORAL at 09:31

## 2024-05-28 RX ADMIN — INSULIN GLARGINE 100 UNITS: 100 INJECTION, SOLUTION SUBCUTANEOUS at 21:17

## 2024-05-28 RX ADMIN — ACETAMINOPHEN 975 MG: 325 TABLET ORAL at 14:07

## 2024-05-28 RX ADMIN — ALLOPURINOL 100 MG: 100 TABLET ORAL at 09:32

## 2024-05-28 RX ADMIN — HYDROMORPHONE HYDROCHLORIDE 3 MG: 2 TABLET ORAL at 09:31

## 2024-05-28 RX ADMIN — HYDROMORPHONE HYDROCHLORIDE 3 MG: 2 TABLET ORAL at 16:32

## 2024-05-28 RX ADMIN — CARVEDILOL 25 MG: 25 TABLET, FILM COATED ORAL at 09:32

## 2024-05-28 RX ADMIN — HYDROMORPHONE HYDROCHLORIDE 3 MG: 2 TABLET ORAL at 21:19

## 2024-05-28 RX ADMIN — HYDRALAZINE HYDROCHLORIDE 100 MG: 50 TABLET, FILM COATED ORAL at 14:12

## 2024-05-28 RX ADMIN — BUPROPION HYDROCHLORIDE 300 MG: 150 TABLET, EXTENDED RELEASE ORAL at 09:31

## 2024-05-28 RX ADMIN — CARVEDILOL 25 MG: 25 TABLET, FILM COATED ORAL at 16:32

## 2024-05-28 RX ADMIN — HYDROMORPHONE HYDROCHLORIDE 3 MG: 2 TABLET ORAL at 04:23

## 2024-05-28 RX ADMIN — DEXAMETHASONE SODIUM PHOSPHATE 4 MG: 4 INJECTION, SOLUTION INTRAMUSCULAR; INTRAVENOUS at 06:40

## 2024-05-28 RX ADMIN — AMLODIPINE BESYLATE 10 MG: 10 TABLET ORAL at 09:32

## 2024-05-28 RX ADMIN — PREGABALIN 50 MG: 50 CAPSULE ORAL at 21:20

## 2024-05-28 RX ADMIN — PANTOPRAZOLE SODIUM 40 MG: 40 TABLET, DELAYED RELEASE ORAL at 09:31

## 2024-05-28 RX ADMIN — LIDOCAINE HYDROCHLORIDE 10 ML: 10 INJECTION, SOLUTION EPIDURAL; INFILTRATION; INTRACAUDAL; PERINEURAL at 15:30

## 2024-05-28 RX ADMIN — ACETAMINOPHEN 975 MG: 325 TABLET ORAL at 18:12

## 2024-05-28 RX ADMIN — HYDRALAZINE HYDROCHLORIDE 100 MG: 50 TABLET, FILM COATED ORAL at 21:19

## 2024-05-28 RX ADMIN — POLYETHYLENE GLYCOL 3350 17 G: 17 POWDER, FOR SOLUTION ORAL at 18:19

## 2024-05-28 RX ADMIN — ACETAMINOPHEN 975 MG: 325 TABLET ORAL at 21:19

## 2024-05-28 RX ADMIN — PREGABALIN 50 MG: 50 CAPSULE ORAL at 14:07

## 2024-05-28 ASSESSMENT — ACTIVITIES OF DAILY LIVING (ADL)
ADLS_ACUITY_SCORE: 23

## 2024-05-28 NOTE — PROGRESS NOTES
[facility] MEDICINE PROGRESS NOTE      Summary: 59 year old male into [facility] on 5/25/2024 after presenting for acute  Low back pain with left leg radiculopathy.  MRI imaging on admission showed a L3-4  Disc pathology causing recess stenosis and impingement on the left L4 nerve root.  Pt had no loss of function though was in a severe amount of pain causing admission.    Hospital course is marked by clinical support for his acute back and left leg pain.  He  Was given dexamethasone, iv and oral narcotics and valium. He has some urinary  Retention though has a history of BPH.  (No evidence on imaging of cauda equina).  A anderson was placed.      5/26:  difficult to control pain; will change from as needed to   Scheduled dilaudid; glucose running high with steroids    5/27:  at 0830, he just got up and now has miserable pain in the   Left leg again;  case discussed with pain (on the side)   Will add as needed oral dilaudid; up the tylenol dosing   Increase the gabapentin (temporarily) until JUAN and   Pain improves; insulin changed again (will add AM   Lantus ) in hopes of decreasing the short acting   Insulin; hope to have JUAN tomorrow    5/28:  improved; iv dilaudid use is less; blood pressure is finally   Improved; no BM since last week; case discussed with   Pain team who will make some changes; pt scheduled   For IR injection at some point today; glucose levels   Reviewed; much improved since admission; will   Discontinue the dexamethasone today     Hospital day number 3    Problem list:     Acute low back pain with left leg radiculopathy: due to left L4 nerve compression from recess stenosis; making slow improvements        With pain; he feels the right leg gives way when he stands; the         (Has been lying on it for days )    HTN: norvasc 10 mg, hydralazine 100 mg 3 times daily; losartan  On hold; pending metabolic panel for today    DM2, insulin dependent:  pt changed to twice  daily long acting   Insulin yesterday (after much education) along with carb   Counting insulin;   CAD, history of stents: coreg at proper dosing of twice daily; baby aspirin for life  JANAK: pt does not use cpap  Urinary retention: anderson in place; add flomax; (no cauda )  NUSRAT:  baseline 1.25 in 2023; now at 1.40; hold losartan, ivf, CTM  Dvt prevention:  scd        Shyann Elizondo MD  [facility]  Phone: #[phone]  Securely message with the [employer]oc[employer] Web Console (learn more here)  Text page via [employer] Paging/Directory     Interval History/Subjective: much more comfortable today      Physical Exam/Objective:  Temp:  [97.3  F (36.3  C)-98.3  F (36.8  C)] 97.4  F (36.3  C)  Pulse:  [66-73] 69  Resp:  [18-20] 18  BP: (136-170)/(64-85) 154/64  SpO2:  [93 %-96 %] 96 %  Body mass index is 37.66 kg/m .    GENERAL:  Left leg feels improved; now the right leg hurts from lying on it   HEAD:  Normocephalic, without obvious abnormality, atraumatic   EYES:  PERRL, conjunctiva/corneas clear, no scleral icterus, EOM's intact   NOSE: Nares normal, septum midline, mucosa normal, no drainage   THROAT: Lips, mucosa, and tongue normal; teeth and gums normal, mouth moist   NECK: Supple, symmetrical, trachea midline   BACK:   Symmetric, no curvature, ROM normal   LUNGS:   Clear to auscultation bilaterally, no rales, rhonchi, or wheezing, symmetric chest rise on inhalation, respirations unlabored   CHEST WALL:  No tenderness or deformity   HEART:  Regular rate and rhythm, S1 and S2 normal, no murmur, rub, or gallop    ABDOMEN:   Soft, non-tender, bowel sounds active all four quadrants, no masses, no organomegaly, no rebound or guarding   EXTREMITIES: Extremities normal, atraumatic, no cyanosis or edema    SKIN: Dry to touch, no exanthems in the visualized areas   NEURO: Alert, oriented x3, moves all four extremities freely; not gaited   PSYCH: Cooperative, behavior is appropriate       Data reviewed today: I personally reviewed all new medications, labs, imaging/diagnostics reports over the past 24 hours. Pertinent findings include:    Imaging:   No results found for this or any previous visit (from the past 24 hour(s)).      Labs:  Most Recent 3 BMP's:  Recent Labs   Lab Test 05/28/24  1124 05/28/24  0838 05/28/24  0627 05/27/24  1144 05/27/24  1022 05/26/24  0830 05/26/24  0554 05/25/24  1424 05/25/24  0940   NA  --   --   --   --  137  --  133*  --  139   POTASSIUM  --   --   --   --  5.5*  --  5.4*  --  5.1   CHLORIDE  --   --   --   --  106  --  100  --  106   CO2  --   --   --   --  21*  --  17*  --  23   BUN  --   --   --   --  47.7*  --  33.8*  --  23.7*   CR  --   --   --   --  1.40*  --  1.45*  --  1.40*   ANIONGAP  --   --   --   --  10  --  16*  --  10   BELA  --   --   --   --  9.4  --  9.6  --  9.2   * 212* 197*   < > 232*   < > 269*   < > 182*    < > = values in this interval not displayed.       Medications:   Personally Reviewed.  Medications   Current Facility-Administered Medications   Medication Dose Route Frequency Provider Last Rate Last Admin     Current Facility-Administered Medications   Medication Dose Route Frequency Provider Last Rate Last Admin    acetaminophen (TYLENOL) tablet 975 mg  975 mg Oral 4x Daily Shyann Elizondo MD   975 mg at 05/28/24 0930    allopurinol (ZYLOPRIM) tablet 100 mg  100 mg Oral Daily Shyann Elizondo MD   100 mg at 05/28/24 0932    amLODIPine (NORVASC) tablet 10 mg  10 mg Oral Daily Abran Gaspar MD   10 mg at 05/28/24 0932    aspirin EC tablet 81 mg  81 mg Oral Daily Shyann Elizondo MD   81 mg at 05/28/24 0932    buPROPion (WELLBUTRIN XL) 24 hr tablet 300 mg  300 mg Oral QAM Abran Gaspar MD   300 mg at 05/28/24 0931    carvedilol (COREG) tablet 25 mg  25 mg Oral BID w/meals Shyann Elizondo MD   25 mg at 05/28/24 0932    dexAMETHasone (DECADRON) injection 4 mg  4 mg Intravenous Q6H Shyann Elizondo MD   4 mg at  05/28/24 0640    diazepam (VALIUM) half-tab 2.5 mg  2.5 mg Oral TID Shynan Elizondo MD   2.5 mg at 05/28/24 0931    gabapentin (NEURONTIN) capsule 300 mg  300 mg Oral TID Shyann Elizondo MD   300 mg at 05/28/24 0932    hydrALAZINE (APRESOLINE) tablet 100 mg  100 mg Oral Q8H [facility] Shyann Elizondo MD   100 mg at 05/28/24 0640    HYDROmorphone (DILAUDID) half-tab 3 mg  3 mg Oral Q6H Shyann Elizondo MD   3 mg at 05/28/24 0931    insulin aspart (NovoLOG) injection (RAPID ACTING)   Subcutaneous Daily with breakfast Shyann Elizondo MD        insulin aspart (NovoLOG) injection (RAPID ACTING)   Subcutaneous Daily with lunch Shyann Elizondo MD   20 Units at 05/27/24 1427    insulin aspart (NovoLOG) injection (RAPID ACTING)   Subcutaneous Daily with supper Shyann Elizondo MD   24 Units at 05/27/24 1848    insulin glargine (LANTUS PEN) injection 100 Units  100 Units Subcutaneous At Bedtime Abran Gaspar MD   100 Units at 05/27/24 2142    insulin glargine (LANTUS PEN) injection 20 Units  20 Units Subcutaneous QAM Shyann Elizondo MD   20 Units at 05/27/24 0949    ketorolac (TORADOL) injection 15 mg  15 mg Intravenous 4x Daily Shyann Elizondo MD   15 mg at 05/27/24 2118    levothyroxine (SYNTHROID/LEVOTHROID) tablet 50 mcg  50 mcg Oral Daily Shyann Elizondo MD   50 mcg at 05/28/24 0932    [Held by provider] losartan (COZAAR) tablet 50 mg  50 mg Oral Daily Shyann Elizondo MD   50 mg at 05/26/24 0836    pantoprazole (PROTONIX) EC tablet 40 mg  40 mg Oral Daily Shyann Elizondo MD   40 mg at 05/28/24 0931    polyethylene glycol (MIRALAX) Packet 17 g  17 g Oral BID Shyann Elizondo MD        tamsulosin (FLOMAX) capsule 0.4 mg  0.4 mg Oral Daily Shyann Elizondo MD   0.4 mg at 05/28/24 0931

## 2024-05-28 NOTE — PLAN OF CARE
Problem: Pain Acute  Goal: Optimal Pain Control and Function  Outcome: Progressing  Intervention: Prevent or Manage Pain  Recent Flowsheet Documentation  Taken 5/28/2024 0900 by Valeria Marks RN  Sleep/Rest Enhancement:   awakenings minimized   family presence promoted   natural light exposure provided   reading promoted   relaxation techniques promoted   regular sleep/rest pattern promoted   visualization  Bowel Elimination Promotion:   adequate fluid intake promoted   ambulation promoted   diet adjusted  Medication Review/Management:   medications reviewed   high-risk medications identified  Intervention: Optimize Psychosocial Wellbeing  Recent Flowsheet Documentation  Taken 5/28/2024 0900 by Valeria Marks RN  Supportive Measures:   active listening utilized   goal-setting facilitated   guided imagery facilitated   positive reinforcement provided   journaling promoted   problem-solving facilitated   relaxation techniques promoted   self-care encouraged   self-reflection promoted   self-responsibility promoted   verbalization of feelings encouraged   counseling provided   decision-making supported   Goal Outcome Evaluation:  Pt has been able to ambulate assist of one with walker and gait belt.     Pt has IR apt at 3pm today for procedure, neurosurg updated.     Pt has been unable to have a BM, anderson remains in place until pt is able to ambulate more.     Pt family has been updated.     Pt refused miralax prior to procedure. Pm RN aware.

## 2024-05-28 NOTE — IR NOTE
Patient Name: Mark Milton Traaseth  Medical Record Number: [id number]  Today's Date: 5/28/2024    Procedure: left L3-4 transforaminal epidural steroid injection  Proceduralist: Dr. Correa      Sedation medications administered: none    Report given to: LILLIANA Osuna  : n/a    Other Notes: Pt arrived to IR room 1 from Pre/post bay 1. Consent reviewed. Pt denies any questions or concerns regarding procedure. Pt positioned prone and monitored per protocol. Pt tolerated procedure without any noted complications. Pt transferred back to  [facility] .    Injection site is covered with bandaid and is clean, dry, and intact at time of transfer.

## 2024-05-28 NOTE — CONSULTS
Remote note     Consult received for consideration for left L3-4 transforaminal JUAN.     Will plan for left L3-4 TFESI later today.       Balbina Mcdonald, CNP  [phone]

## 2024-05-28 NOTE — PROGRESS NOTES
[facility] ACUTE INPATIENT PAIN SERVICE    [facility], [facility], [facility], [facility], [place]   PAIN Progress Note:    Assessment/Plan:  Mark Milton Traaseth is a 59 year old male who was admitted on 5/25/2024.  Acute Pain Management Team was asked by Dr. Elizondo to see the patient for low back pain, radiculopathy. Admitted for acute back pain and left leg pain.. History including HTN, CAD, DM2, remote Hx of lumbar surgery. Lumbar MRI shows L3-4 left sided herniated disc, with left lateral recess stenosis and nerve impingement.  Neurosurgery consulted, left L3-4 transforaiminal JUAN scheduled today. will h.    shows no opioids. Describes left sided hip pain as 2/10 and minimal pain in left leg.  Patient does not report muscle spasms.  Patient has Dilaudid 3 mg scheduled q 6 h, and no PRN 2 mg doses given in last 24 hours. Patient did receive 2 doses of IV Dilaudid 1 mg yesterday.  Patient reporting swelling with gabapentin, but has tolerated Lyrica in the past.   Patient should be going to IR for JUAN soon.    Discussed plan with Dr. Elizondo    PLAN:  Acute on chronic low back pain, finding herniated disc, and will have JUAN today.    Multimodal Medication Therapy:   Adjuvants:  Tylenol 975 mg qid, discontinue Toradol NSAIDs, was with NUSRAT, also on dexamethasone, discontinue diazepam 2.5 mg tid, methocarbamol 500 mg tid prn muscle spasms,  discontinue gabapentin 300 mg tid, trial Lyrica 50 mg tid, Atarax 25 mg q 4 h prn itching, Wellbutrin  mg q am, dexamethasone 4 mg IV q 6 h-defer to Neurosurgery as to when to stop this dexamethasone.  Opioids: Dilaudid 3 mg q 6 h, Dilaudid 2 mg q 4h prn  IV: Dilaudid-  Non-medication interventions- Ice, PT  Constipation Prophylaxis- Add Miralax daily, senna/doscuate is prn  Follow up /Discharge Recommendations - We recommend prescribing the following at the time of   discharge:  Dilaudid 2 mg q 4 h prn, possibly Lyrica        History   Drug Use Not on file          Tobacco Use      Smoking status: None      Smokeless tobacco: None      Imaging:    Results for orders placed or performed during the hospital encounter of 05/25/24   Lumbar spine MRI w/o contrast    Impression    IMPRESSION:  1.  Unremarkable cord and cauda equina nerve roots.  2.  No high-grade spinal canal stenosis.  3.  At L3-L4, there is a left-sided extrusion which contributes to left lateral recess stenosis and impingement upon the descending left L4 nerve.  4.  At L4-L5, there is a central disc herniation without high-grade stenosis of the spinal canal or neural foramina. Prior right hemilaminotomy.  5.  At L5-S1, there is moderate bilateral neural foraminal stenosis.                Shyann Welch [facility]  Acute Care Pain Management Program   Hours of pain coverage Mon-Fri 8-1600, afterhours please call the house officer   DEEP Amato ([place], ZEEs, [place], [place])   Page via [employer]

## 2024-05-28 NOTE — PROGRESS NOTES
[facility]    Neurosurgery  Daily Note    Assessment & Plan   Mark Milton Traaseth is a 59 year old male with a history of prior L4-5 decompression 20 years ago who presented to the ED with a 2 day history of back and leg pain. Imaging revealed a left-sided disc extrusion contributing to left lateral recess stenosis and impingement upon the descending left L4 nerve.     24 hour events: No events overnight    AM Rounds: Improvement/resolution of low back and left lower extremity pain with pain regimen/steroids. Baseline peripheral neuropathy in bilateral feet.  Mild weakness with left knee extension, remaining LLE strength 5/5.  Springer catheter in place, placed in ER for retention.  IR consulted for lumbar JUAN, pending time.      Plan:  -Left L3-4 transforamainal JUAN ordered with IR, pending time  -Currently n.p.o. if JUAN possible today, if unable to do JUAN today we will plan to be n.p.o. at midnight   -Continue pain management per primary service  -Appreciate assistance from hospitalist regarding blood sugars on steroids  -Therapy as tolerated  -No imaging findings to correlate with urinary retention  -Neurosurgery will continue to chart check and re-evaluate in person after JUAN  -Please contact neurosurgery with any new/worsening symptoms and/or with questions or concerns.    Plans discussed with Dr. Lind who was in agreement with plans.    María Guardado PA-C  [facility] Neurosurgery  [address] [id number]      Physical Exam   Temp: 97.4  F (36.3  C) Temp src: Oral BP: (!) 154/64 Pulse: 69   Resp: 18 SpO2: 96 % O2 Device: None (Room air)    Vitals:    05/25/24 0832 05/26/24 0432 05/27/24 0345   Weight: 297 lb (134.7 kg) 288 lb 4.8 oz (130.8 kg) 293 lb 4.8 oz (133 kg)     Vital Signs with Ranges  Temp:  [97.3  F (36.3  C)-98.3  F (36.8  C)] 97.4  F (36.3  C)  Pulse:  [66-73] 69  Resp:  [18-20] 18  BP: (136-170)/(64-85) 154/64  SpO2:  [93 %-96 %]  96 %  I/O last 3 completed shifts:  In: -   Out: 3250 [Urine:3250]    Awake, alert.  NAD.  Baseline bilateral paresthesias in feet  History of amputation of right foot digits  RLE strength 5/5   LLE strength 5/5, except knee extension 4+/5  Springer catheter in place      Medications   Current Facility-Administered Medications   Medication Dose Route Frequency Provider Last Rate Last Admin      Current Facility-Administered Medications   Medication Dose Route Frequency Provider Last Rate Last Admin    acetaminophen (TYLENOL) tablet 975 mg  975 mg Oral 4x Daily Syhann Elizondo MD   975 mg at 05/28/24 0930    allopurinol (ZYLOPRIM) tablet 100 mg  100 mg Oral Daily Shyann Elizondo MD   100 mg at 05/28/24 0932    amLODIPine (NORVASC) tablet 10 mg  10 mg Oral Daily Abran Gaspar MD   10 mg at 05/28/24 0932    aspirin EC tablet 81 mg  81 mg Oral Daily Shyann Elizondo MD   81 mg at 05/28/24 0932    buPROPion (WELLBUTRIN XL) 24 hr tablet 300 mg  300 mg Oral QAM Abran Gaspar MD   300 mg at 05/28/24 0931    carvedilol (COREG) tablet 25 mg  25 mg Oral BID w/meals Shyann Elizondo MD   25 mg at 05/28/24 0932    dexAMETHasone (DECADRON) injection 4 mg  4 mg Intravenous Q6H Shyann Elizondo MD   4 mg at 05/28/24 0640    diazepam (VALIUM) half-tab 2.5 mg  2.5 mg Oral TID Shyann Elizondo MD   2.5 mg at 05/28/24 0931    gabapentin (NEURONTIN) capsule 300 mg  300 mg Oral TID Shyann Elizondo MD   300 mg at 05/28/24 0932    hydrALAZINE (APRESOLINE) tablet 100 mg  100 mg Oral Q8H [facility] Shyann Elizondo MD   100 mg at 05/28/24 0640    HYDROmorphone (DILAUDID) half-tab 3 mg  3 mg Oral Q6H Shyann Elizondo MD   3 mg at 05/28/24 0931    insulin aspart (NovoLOG) injection (RAPID ACTING)   Subcutaneous Daily with breakfast Shyann Elizondo MD        insulin aspart (NovoLOG) injection (RAPID ACTING)   Subcutaneous Daily with lunch Shyann Elizondo MD   20 Units at 05/27/24 1427    insulin aspart (NovoLOG)  injection (RAPID ACTING)   Subcutaneous Daily with supper Shyann Elizondo MD   24 Units at 05/27/24 1848    insulin glargine (LANTUS PEN) injection 100 Units  100 Units Subcutaneous At Bedtime Abran Gaspar MD   100 Units at 05/27/24 2142    insulin glargine (LANTUS PEN) injection 20 Units  20 Units Subcutaneous QAM Shyann Elizondo MD   20 Units at 05/27/24 0949    ketorolac (TORADOL) injection 15 mg  15 mg Intravenous 4x Daily Shyann Elizondo MD   15 mg at 05/27/24 2118    levothyroxine (SYNTHROID/LEVOTHROID) tablet 50 mcg  50 mcg Oral Daily Shyann Elizondo MD   50 mcg at 05/28/24 0932    [Held by provider] losartan (COZAAR) tablet 50 mg  50 mg Oral Daily Shyann Elizondo MD   50 mg at 05/26/24 0836    pantoprazole (PROTONIX) EC tablet 40 mg  40 mg Oral Daily Shyann Elizondo MD   40 mg at 05/28/24 0931    polyethylene glycol (MIRALAX) Packet 17 g  17 g Oral BID Shyann Elizondo MD        tamsulosin (FLOMAX) capsule 0.4 mg  0.4 mg Oral Daily Shyann Elizondo MD   0.4 mg at 05/28/24 0931

## 2024-05-28 NOTE — PROCEDURES
Neurointerventional Surgery  Post-procedure     Patient Name: Mark Milton Traaseth  MRN: [id number]  Date of Procedure: May 28, 2024    Procedure: Left L3-4 TFESI  Radiologist: Ronald Correa MD    Contrast: 2 ml Isovue-180  Fluoro Time: 1.0 minutes  Air Kerma: 85 mGy    Medications: Lidocaine 1% 3 ml epidural, 10 mg dexamethasone epidural  Sedation Time: n/a    EBL: min   Complications: none    Patient reevaluated immediately prior to sedation and prior to procedure.    Preliminary Report:   (See dictation for full detail)  Technically successful left L3-4 TFESI  Pre-procedure pain = 2/10, post-procedure = 0/10    Assess/Plan:  Bedrest x 1 hr  Report to ordering    Ronald Correa MD   Emergency pager: [phone]  Office: [phone]

## 2024-05-28 NOTE — PLAN OF CARE
Problem: Adult Inpatient Plan of Care  Goal: Optimal Comfort and Wellbeing  Outcome: Progressing     Problem: Pain Acute  Goal: Optimal Pain Control and Function  Outcome: Progressing   Goal Outcome Evaluation:    Patient is alert and oriented. Rated his pain a 3/10 tonight. Pain has been managed with scheduled tylenol, gabapentin, dilaudid, and toradol. NPO and no toradol after midnight for JUAN tomorrow.

## 2024-05-29 LAB
ANION GAP SERPL CALCULATED.3IONS-SCNC: 10 MMOL/L (ref 7–15)
BUN SERPL-MCNC: 46.6 MG/DL (ref 6–20)
CALCIUM SERPL-MCNC: 9.4 MG/DL (ref 8.6–10)
CHLORIDE SERPL-SCNC: 106 MMOL/L (ref 98–107)
CREAT SERPL-MCNC: 1.24 MG/DL (ref 0.67–1.17)
DEPRECATED HCO3 PLAS-SCNC: 21 MMOL/L (ref 22–29)
EGFRCR SERPLBLD CKD-EPI 2021: 68 ML/MIN/1.73M2
GLUCOSE BLDC GLUCOMTR-MCNC: 144 MG/DL (ref 70–99)
GLUCOSE BLDC GLUCOMTR-MCNC: 161 MG/DL (ref 70–99)
GLUCOSE BLDC GLUCOMTR-MCNC: 189 MG/DL (ref 70–99)
GLUCOSE BLDC GLUCOMTR-MCNC: 228 MG/DL (ref 70–99)
GLUCOSE BLDC GLUCOMTR-MCNC: 230 MG/DL (ref 70–99)
GLUCOSE SERPL-MCNC: 197 MG/DL (ref 70–99)
HOLD SPECIMEN: NORMAL
POTASSIUM SERPL-SCNC: 5.1 MMOL/L (ref 3.4–5.3)
SODIUM SERPL-SCNC: 137 MMOL/L (ref 135–145)

## 2024-05-29 PROCEDURE — 250N000013 HC RX MED GY IP 250 OP 250 PS 637: Performed by: NURSE PRACTITIONER

## 2024-05-29 PROCEDURE — 99222 1ST HOSP IP/OBS MODERATE 55: CPT | Performed by: NURSE PRACTITIONER

## 2024-05-29 PROCEDURE — 120N000001 HC R&B MED SURG/OB

## 2024-05-29 PROCEDURE — 99233 SBSQ HOSP IP/OBS HIGH 50: CPT | Performed by: EMERGENCY MEDICINE

## 2024-05-29 PROCEDURE — 250N000013 HC RX MED GY IP 250 OP 250 PS 637: Performed by: INTERNAL MEDICINE

## 2024-05-29 PROCEDURE — 250N000013 HC RX MED GY IP 250 OP 250 PS 637: Performed by: EMERGENCY MEDICINE

## 2024-05-29 PROCEDURE — 99207 PR CDG-CUT & PASTE-POTENTIAL IMPACT ON LEVEL: CPT | Performed by: EMERGENCY MEDICINE

## 2024-05-29 PROCEDURE — 80048 BASIC METABOLIC PNL TOTAL CA: CPT | Performed by: EMERGENCY MEDICINE

## 2024-05-29 PROCEDURE — 36415 COLL VENOUS BLD VENIPUNCTURE: CPT | Performed by: EMERGENCY MEDICINE

## 2024-05-29 RX ORDER — METHOCARBAMOL 500 MG/1
500 TABLET, FILM COATED ORAL EVERY 6 HOURS PRN
Status: DISCONTINUED | OUTPATIENT
Start: 2024-05-29 | End: 2024-05-30 | Stop reason: HOSPADM

## 2024-05-29 RX ADMIN — BUPROPION HYDROCHLORIDE 300 MG: 150 TABLET, EXTENDED RELEASE ORAL at 06:50

## 2024-05-29 RX ADMIN — HYDRALAZINE HYDROCHLORIDE 100 MG: 50 TABLET, FILM COATED ORAL at 06:49

## 2024-05-29 RX ADMIN — ACETAMINOPHEN 975 MG: 325 TABLET ORAL at 20:52

## 2024-05-29 RX ADMIN — INSULIN GLARGINE 100 UNITS: 100 INJECTION, SOLUTION SUBCUTANEOUS at 20:52

## 2024-05-29 RX ADMIN — HYDRALAZINE HYDROCHLORIDE 100 MG: 50 TABLET, FILM COATED ORAL at 13:18

## 2024-05-29 RX ADMIN — ACETAMINOPHEN 975 MG: 325 TABLET ORAL at 09:25

## 2024-05-29 RX ADMIN — CARVEDILOL 25 MG: 25 TABLET, FILM COATED ORAL at 08:10

## 2024-05-29 RX ADMIN — ALLOPURINOL 100 MG: 100 TABLET ORAL at 08:11

## 2024-05-29 RX ADMIN — POLYETHYLENE GLYCOL 3350 17 G: 17 POWDER, FOR SOLUTION ORAL at 20:52

## 2024-05-29 RX ADMIN — HYDROMORPHONE HYDROCHLORIDE 2 MG: 2 TABLET ORAL at 06:55

## 2024-05-29 RX ADMIN — LEVOTHYROXINE SODIUM 50 MCG: 0.05 TABLET ORAL at 08:10

## 2024-05-29 RX ADMIN — ASPIRIN 81 MG: 81 TABLET, COATED ORAL at 08:10

## 2024-05-29 RX ADMIN — METHOCARBAMOL 500 MG: 500 TABLET ORAL at 19:10

## 2024-05-29 RX ADMIN — ACETAMINOPHEN 975 MG: 325 TABLET ORAL at 17:13

## 2024-05-29 RX ADMIN — AMLODIPINE BESYLATE 10 MG: 10 TABLET ORAL at 08:11

## 2024-05-29 RX ADMIN — POLYETHYLENE GLYCOL 3350 17 G: 17 POWDER, FOR SOLUTION ORAL at 08:12

## 2024-05-29 RX ADMIN — HYDRALAZINE HYDROCHLORIDE 100 MG: 50 TABLET, FILM COATED ORAL at 20:52

## 2024-05-29 RX ADMIN — PREGABALIN 50 MG: 50 CAPSULE ORAL at 13:18

## 2024-05-29 RX ADMIN — HYDROMORPHONE HYDROCHLORIDE 3 MG: 2 TABLET ORAL at 02:54

## 2024-05-29 RX ADMIN — PANTOPRAZOLE SODIUM 40 MG: 40 TABLET, DELAYED RELEASE ORAL at 08:11

## 2024-05-29 RX ADMIN — HYDROMORPHONE HYDROCHLORIDE 3 MG: 2 TABLET ORAL at 14:32

## 2024-05-29 RX ADMIN — HYDROMORPHONE HYDROCHLORIDE 3 MG: 2 TABLET ORAL at 08:10

## 2024-05-29 RX ADMIN — PREGABALIN 50 MG: 50 CAPSULE ORAL at 20:51

## 2024-05-29 RX ADMIN — CARVEDILOL 25 MG: 25 TABLET, FILM COATED ORAL at 17:12

## 2024-05-29 RX ADMIN — HYDROMORPHONE HYDROCHLORIDE 3 MG: 2 TABLET ORAL at 20:51

## 2024-05-29 RX ADMIN — TAMSULOSIN HYDROCHLORIDE 0.4 MG: 0.4 CAPSULE ORAL at 08:10

## 2024-05-29 RX ADMIN — PREGABALIN 50 MG: 50 CAPSULE ORAL at 08:11

## 2024-05-29 ASSESSMENT — ACTIVITIES OF DAILY LIVING (ADL)
ADLS_ACUITY_SCORE: 23
ADLS_ACUITY_SCORE: 22
ADLS_ACUITY_SCORE: 23
ADLS_ACUITY_SCORE: 22
ADLS_ACUITY_SCORE: 23
ADLS_ACUITY_SCORE: 22
ADLS_ACUITY_SCORE: 23
ADLS_ACUITY_SCORE: 22
ADLS_ACUITY_SCORE: 23
ADLS_ACUITY_SCORE: 22
ADLS_ACUITY_SCORE: 22

## 2024-05-29 NOTE — PROGRESS NOTES
[facility] MEDICINE PROGRESS NOTE      Summary: 59 year old male into [facility] on 5/25/2024 after presenting for acute  Low back pain with left leg radiculopathy.  MRI imaging on admission showed a L3-4  Disc pathology causing recess stenosis and impingement on the left L4 nerve root.  Pt had no loss of function though was in a severe amount of pain causing admission.    Hospital course is marked by clinical support for his acute back and left leg pain.  He  Was given dexamethasone, iv and oral narcotics and valium. He has some urinary  Retention though has a history of BPH.  (No evidence on imaging of cauda equina).  A anderson was placed.    Hospital day number 4    5/26:  difficult to control pain; will change from as needed to   Scheduled dilaudid; glucose running high with steroids    5/27:  at 0830, he just got up and now has miserable pain in the   Left leg again;  case discussed with pain (on the side)   Will add as needed oral dilaudid; up the tylenol dosing   Increase the gabapentin (temporarily) until JUAN and   Pain improves; insulin changed again (will add AM   Lantus ) in hopes of decreasing the short acting   Insulin; hope to have JUAN tomorrow    5/28:  improved; iv dilaudid use is less; blood pressure is finally   Improved; no BM since last week; case discussed with   Pain team who will make some changes; pt scheduled   For IR injection at some point today; glucose levels   Reviewed; much improved since admission; will   Discontinue the dexamethasone today     5/29/2024:  pain improved though persists; he is up on his feet   Today with walker to BR; states at times the left leg gives    Way with no strength    Hospital day number 4    Problem list:     Acute low back pain with left leg radiculopathy: due to left L4 nerve compression from recess stenosis; day 1 post JUAN    HTN: norvasc 10 mg, hydralazine 100 mg 3 times daily; carvedilol  25 mg twice daily; losartan on hold  due to NUSRAT; as needed  Iv hydralazine    DM2, insulin dependent, likely insulin resistent: increase  Lantus in AM to 25; continue with coverage for carbs  CAD, history of stents: coreg at proper dosing of twice daily; baby aspirin for life  JANAK: pt does not use cpap  Urinary retention: discontinue anderson today for TOV  NUSRAT:  baseline 1.25 in 2023; now at 1.40; hold losartan, ivf, CTM  Dvt prevention:  scd        Shyann Elizondo MD  [facility]  [facility]  Phone: #[phone]  Securely message with the Vocera Web Console (learn more here)  Text page via [employer] Paging/Directory     Interval History/Subjective: improved though slow; dilaudid use   Reviewed; iv steroids discontinued yesterday      Physical Exam/Objective:  Temp:  [97.3  F (36.3  C)-97.6  F (36.4  C)] 97.4  F (36.3  C)  Pulse:  [61-69] 63  Resp:  [18-22] 18  BP: (135-178)/(50-81) 160/80  SpO2:  [95 %-98 %] 96 %  Body mass index is 37.66 kg/m .    GENERAL:  Left leg feels improved; now the right leg hurts from lying on it   HEAD:  Normocephalic, without obvious abnormality, atraumatic   EYES:  PERRL, conjunctiva/corneas clear, no scleral icterus, EOM's intact   NOSE: Nares normal, septum midline, mucosa normal, no drainage   THROAT: Lips, mucosa, and tongue normal; teeth and gums normal, mouth moist   NECK: Supple, symmetrical, trachea midline   BACK:   Symmetric, no curvature, ROM normal   LUNGS:   Clear to auscultation bilaterally, no rales, rhonchi, or wheezing, symmetric chest rise on inhalation, respirations unlabored   CHEST WALL:  No tenderness or deformity   HEART:  Regular rate and rhythm, S1 and S2 normal, no murmur, rub, or gallop    ABDOMEN:   Soft, non-tender, bowel sounds active all four quadrants, no masses, no organomegaly, no rebound or guarding   EXTREMITIES: Extremities normal, atraumatic, no cyanosis or edema    SKIN: Dry to touch, no exanthems in the visualized areas   NEURO: Alert,  oriented x3, moves all four extremities freely; not gaited   PSYCH: Cooperative, behavior is appropriate      Data reviewed today: I personally reviewed all new medications, labs, imaging/diagnostics reports over the past 24 hours. Pertinent findings include:    Imaging:   Recent Results (from the past 24 hour(s))   IR Lumbar Sacral Transfor Injection Bilateral    Narrative     [facility]  INTERVENTIONAL NEURORADIOLOGY  5/28/2024 3:48 PM CDT    FLUOROSCOPICALLY GUIDED PERCUTANEOUS LEFT TRANSFORAMINAL L3-4 EPIDURAL STEROID INJECTION    INDICATION: Low back pain, left lower extremity pain and weakness. MRI shows L3-4 disc bulge eccentric to the left with neural foraminal encroachment. Epidural steroid injection requested due to poor pain control via conservative measures.    CONSENT: The procedure and its indications, major risks, benefits, and alternatives were discussed. Risks including, but not limited to, pain, headache, hemorrhage, infection, nerve damage, spinal cord damage, and allergic reaction were discussed.   Understanding was acknowledged, and a signed informed consent was obtained.    FLUOROSCOPIC TIME: 1.0 minutes    AIR KERMA: 85 mGy    CONTRAST: 2 mL Omni 180    ESTIMATED BLOOD LOSS: Minimal    PERFORMING PHYSICIAN(S):  Ronald Correa M.D.  [facility]    PROCEDURE: The patient was placed in the prone position upon the fluoroscopic table. The skin of the back was prepped and draped in sterile fashion. Using fluoroscopic guidance, the L3-4 level was localized. The skin was infiltrated with 1% lidocaine.   Using direct fluoroscopic visualization a 22 G spinal needle was navigated to the region of the left neural foramen. Appropriate positioning was confirmed with the injection of 2 mL of Omnipaque 180. There was no evidence of any vascular filling, with   good opacification of the lateral neural foramen and lateral recess. We then proceeded with injection of a solution  containing 3 mL of 1% lidocaine and 10 mg preservative free dexamethasone. The needle was then removed. A dressing was applied. The   procedure appeared well-tolerated. There was no apparent complication.      Impression    CONCLUSION:     1. Technically successful fluoroscopically-guided percutaneous left transforaminal L3-4 epidural steroid injection.    Pain severity:  Pre-procedure: 2/10  Post-procedure: 0/10  _____________________________________________    CPT codes included for physician reference only:   Lumbar/Sacral: 64483 x1             Labs:  Most Recent 3 BMP's:  Recent Labs   Lab Test 05/29/24  0631 05/29/24  0614 05/28/24  2152 05/28/24  1635 05/28/24  1327 05/27/24  1144 05/27/24  1022   NA  --  137  --   --  137  --  137   POTASSIUM  --  5.1  --   --  5.1  --  5.5*   CHLORIDE  --  106  --   --  106  --  106   CO2  --  21*  --   --  23  --  21*   BUN  --  46.6*  --   --  52.7*  --  47.7*   CR  --  1.24*  --   --  1.34*  --  1.40*   ANIONGAP  --  10  --   --  8  --  10   BELA  --  9.4  --   --  9.7  --  9.4   * 197* 237*   < > 228*   < > 232*    < > = values in this interval not displayed.       Medications:   Personally Reviewed.  Medications   Current Facility-Administered Medications   Medication Dose Route Frequency Provider Last Rate Last Admin     Current Facility-Administered Medications   Medication Dose Route Frequency Provider Last Rate Last Admin    acetaminophen (TYLENOL) tablet 975 mg  975 mg Oral 4x Daily Shyann Elizondo MD   975 mg at 05/28/24 2119    allopurinol (ZYLOPRIM) tablet 100 mg  100 mg Oral Daily Shyann Elizondo MD   100 mg at 05/29/24 0811    amLODIPine (NORVASC) tablet 10 mg  10 mg Oral Daily Abran Gaspar MD   10 mg at 05/29/24 0811    aspirin EC tablet 81 mg  81 mg Oral Daily Shyann Elizondo MD   81 mg at 05/29/24 0810    buPROPion (WELLBUTRIN XL) 24 hr tablet 300 mg  300 mg Oral Abran Bright MD   300 mg at 05/29/24 0650    carvedilol (COREG)  tablet 25 mg  25 mg Oral BID w/meals Shyann Elizondo MD   25 mg at 05/29/24 0810    hydrALAZINE (APRESOLINE) tablet 100 mg  100 mg Oral Q8H [facility] Shyann Elizondo MD   100 mg at 05/29/24 0649    HYDROmorphone (DILAUDID) half-tab 3 mg  3 mg Oral Q6H Shyann Elizondo MD   3 mg at 05/29/24 0810    insulin aspart (NovoLOG) injection (RAPID ACTING)   Subcutaneous Daily with breakfast Shyann Elizondo MD        insulin aspart (NovoLOG) injection (RAPID ACTING)   Subcutaneous Daily with lunch Shyann Elizondo MD   20 Units at 05/27/24 1427    insulin aspart (NovoLOG) injection (RAPID ACTING)   Subcutaneous Daily with supper Shyann Elizondo MD   24 Units at 05/28/24 1813    insulin glargine (LANTUS PEN) injection 100 Units  100 Units Subcutaneous At Bedtime Abran Gaspar MD   100 Units at 05/28/24 2117    insulin glargine (LANTUS PEN) injection 20 Units  20 Units Subcutaneous QAM Shyann Elizondo MD   20 Units at 05/29/24 0655    levothyroxine (SYNTHROID/LEVOTHROID) tablet 50 mcg  50 mcg Oral Daily Shyann Elizondo MD   50 mcg at 05/29/24 0810    [Held by provider] losartan (COZAAR) tablet 50 mg  50 mg Oral Daily Shyann Elizondo MD   50 mg at 05/26/24 0836    pantoprazole (PROTONIX) EC tablet 40 mg  40 mg Oral Daily Shyann Elizondo MD   40 mg at 05/29/24 0811    polyethylene glycol (MIRALAX) Packet 17 g  17 g Oral BID Shyann Elizondo MD   17 g at 05/29/24 0812    pregabalin (LYRICA) capsule 50 mg  50 mg Oral TID Shyann Elizondo MD   50 mg at 05/29/24 0811    tamsulosin (FLOMAX) capsule 0.4 mg  0.4 mg Oral Daily Shyann Elizondo MD   0.4 mg at 05/29/24 0810

## 2024-05-29 NOTE — PROGRESS NOTES
Patient is A & O x 4. Assist x 1 with walker gait belt. VSS, on RA. Pt reports pain in low back, through left leg. Pt has active bowel sounds, passing gas, miralax given for constipation. Voiding utilizing a catheter. L PIV saline locked. Tolerating a 60g carb count diet.

## 2024-05-29 NOTE — PLAN OF CARE
Goal Outcome Evaluation:    Problem: Skin Injury Risk Increased  Goal: Skin Health and Integrity  Intervention: Optimize Skin Protection  Recent Flowsheet Documentation  Taken 5/29/2024 0109 by Berta Greer RN  Activity Management: activity adjusted per tolerance  Head of Bed (HOB) Positioning: HOB at 20-30 degrees     Problem: Pain Acute  Goal: Optimal Pain Control and Function  Outcome: Progressing  Intervention: Prevent or Manage Pain  Recent Flowsheet Documentation  Taken 5/29/2024 0109 by Berta Greer, RN  Medication Review/Management:   medications reviewed   high-risk medications identified     Problem: Comorbidity Management  Goal: Blood Glucose Levels Within Targeted Range  Intervention: Monitor and Manage Glycemia  Recent Flowsheet Documentation  Taken 5/29/2024 0109 by Berta Greer, RN  Medication Review/Management:   medications reviewed   high-risk medications identified   Patient is alert & oriented x4. Vital signs are stable. Pt needs manual BP for accurate reading. On room air. Springer catheter with good urine output. Assist of 1 with gait belt and walker. L Knee cira with ambulation. Pt reported increased strength in LLE and increased pain. Pt required dose of PRN PO dilaudid between scheduled PO dilaudid doses.

## 2024-05-29 NOTE — PROGRESS NOTES
[facility]    Neurosurgery  Daily Note    Assessment & Plan   Mark Milton Traaseth is a 59 year old male with a history of prior L4-5 decompression 20 years ago who presented to the ED with a 2 day history of back and leg pain. Imaging revealed a left-sided disc extrusion contributing to left lateral recess stenosis and impingement upon the descending left L4 nerve.     24 hour events: No events overnight     AM Rounds:  Patient had Left L3-L4 TFESI yesterday with 0/10 post-procedure. Today patient rates back and left leg pain 5/10 with current pain regimen. Springer catheter removed and currently doing voiding trial. Exam stable. Discuss following up with NSGY clinic in 2 weeks if no improvement following injection.       Plan:  - Okay for discharge from NSGY point of care. Advised patient to contact NSGY clinic 2 weeks from injection (6/11/24) if no improvement or worsening of symptoms.   - Will defer pain medication following discharge to hospitalist.     Discharge instructions placed.     NSGY signing off, please page or reconsult for questions or concerns.     Plans discussed with Dr. Lind who was in agreement with plans.    María Guardado PA-C  [facility] Neurosurgery  [address] [facility]  [address] [place] [address]      Physical Exam   Temp: 97.4  F (36.3  C) Temp src: Oral BP: (!) 160/80 (manual) Pulse: 63   Resp: 18 SpO2: 96 % O2 Device: None (Room air)    Vitals:    05/25/24 0832 05/26/24 0432 05/27/24 0345   Weight: 297 lb (134.7 kg) 288 lb 4.8 oz (130.8 kg) 293 lb 4.8 oz (133 kg)     Vital Signs with Ranges  Temp:  [97.3  F (36.3  C)-97.6  F (36.4  C)] 97.4  F (36.3  C)  Pulse:  [61-66] 63  Resp:  [18-22] 18  BP: (135-178)/(50-81) 160/80  SpO2:  [95 %-98 %] 96 %  I/O last 3 completed shifts:  In: 350 [P.O.:350]  Out: 5325 [Urine:5325]      Awake, alert.  NAD.  Baseline bilateral paresthesias in feet  History of amputation of right foot digits  RLE strength 5/5    LLE strength 5/5    Medications   Current Facility-Administered Medications   Medication Dose Route Frequency Provider Last Rate Last Admin      Current Facility-Administered Medications   Medication Dose Route Frequency Provider Last Rate Last Admin    acetaminophen (TYLENOL) tablet 975 mg  975 mg Oral 4x Daily Shyann Elizondo MD   975 mg at 05/29/24 0925    allopurinol (ZYLOPRIM) tablet 100 mg  100 mg Oral Daily Shyann Elizondo MD   100 mg at 05/29/24 0811    amLODIPine (NORVASC) tablet 10 mg  10 mg Oral Daily Abran Gaspar MD   10 mg at 05/29/24 0811    aspirin EC tablet 81 mg  81 mg Oral Daily Shyann Elizondo MD   81 mg at 05/29/24 0810    buPROPion (WELLBUTRIN XL) 24 hr tablet 300 mg  300 mg Oral QAM Abran Gaspar MD   300 mg at 05/29/24 0650    carvedilol (COREG) tablet 25 mg  25 mg Oral BID w/meals Shyann Elizondo MD   25 mg at 05/29/24 0810    hydrALAZINE (APRESOLINE) tablet 100 mg  100 mg Oral Q8H [facility] Shyann Elizondo MD   100 mg at 05/29/24 0649    HYDROmorphone (DILAUDID) half-tab 3 mg  3 mg Oral Q6H Shyann Elizondo MD   3 mg at 05/29/24 0810    insulin aspart (NovoLOG) injection (RAPID ACTING)   Subcutaneous Daily with breakfast Shyann Elizondo MD   2 Units at 05/29/24 0925    insulin aspart (NovoLOG) injection (RAPID ACTING)   Subcutaneous Daily with lunch Shyann Elizondo MD   20 Units at 05/27/24 1427    insulin aspart (NovoLOG) injection (RAPID ACTING)   Subcutaneous Daily with supper Shyann Elizondo MD   24 Units at 05/28/24 1813    insulin glargine (LANTUS PEN) injection 100 Units  100 Units Subcutaneous At Bedtime Abran Gaspar MD   100 Units at 05/28/24 2117    [START ON 5/30/2024] insulin glargine (LANTUS PEN) injection 25 Units  25 Units Subcutaneous Shyann Zhu MD        levothyroxine (SYNTHROID/LEVOTHROID) tablet 50 mcg  50 mcg Oral Daily Shyann Elizondo MD   50 mcg at 05/29/24 0810    [Held by provider] losartan (COZAAR) tablet 50 mg   50 mg Oral Daily Shyann Elizondo MD   50 mg at 05/26/24 0836    pantoprazole (PROTONIX) EC tablet 40 mg  40 mg Oral Daily Shyann Elizondo MD   40 mg at 05/29/24 0811    polyethylene glycol (MIRALAX) Packet 17 g  17 g Oral BID Shyann Elizondo MD   17 g at 05/29/24 0812    pregabalin (LYRICA) capsule 50 mg  50 mg Oral TID Shyann Elizondo MD   50 mg at 05/29/24 0811    tamsulosin (FLOMAX) capsule 0.4 mg  0.4 mg Oral Daily Shyann Elizondo MD   0.4 mg at 05/29/24 0810

## 2024-05-29 NOTE — PLAN OF CARE
Patient had home natural supplements in room- sent down to pharmacy with patient label. Pt accepting and understanding.

## 2024-05-29 NOTE — PLAN OF CARE
Note from [id number]. Pt rated pain 6-9/10 during shift thus far. No new skin issues noted. Numbness and tingling to BLE per pt. SBA with walker for transferring. Saline locked. Voiding adequately, needs to pass 1 more PVR. Education on medication administration and use of call-light to reduce risk for falls and injury. Insulin ordered adjusted. Alarms in place. No further issues noted. Vitals stable, noted HTN, denies shortness of breath.    Taylor R Schoenecker, RN

## 2024-05-29 NOTE — CONSULTS
[facility] ACUTE PAIN SERVICE CONSULTATION   [facility], [facility], [facility], [facility], [place]     Date of Admission:  5/25/2024  Date of Consult (When I saw the patient): 05/29/24     Assessment/Plan:     Mark Milton Traaseth is a 59 year old male who was admitted on 5/25/2024.  Pain team was asked to see the patient for low back pain, lumbar radiculopathy. Admitted for acute low back pain with left lower extremity pain. History of hypertension, CAD, diabetes mellitus type 2, history of lumbar surgery (prior L4-L5 decompression 20 years ago).  Describes pain as 6-8/10 and aching in the low back and this does radiate down the left lower extremity.  He reports mild weakness of the left knee and left hip when ambulating stating he feels like the left hip will buckle from underneath him.     PLAN:   1) Pain is consistent with low back pain, lumbar radiculopathy secondary to a left-sided disc extrusion contributing to left lateral recess stenosis and impingement upon the descending left L4 nerve.  MRI imaging on admission showed a left L3-L4 disc herniation.  Patient had no loss of function that was in severe pain causing admission.  He was given dexamethasone, IV and oral narcotics, and Valium.  Yesterday patient had a left L3-L4 TFESI.  Neurosurgery has been following.  Multimodal Medication Therapy  Topical: Consider  NSAID'S: None  Steroids: Dexamethasone per neurosurgery completed  Muscle Relaxants: Robaxin as needed  Adjuvants: Acetaminophen 975 mg 4 times daily, gabapentin was discontinued and now on Lyrica 50 mg 3 times daily, hydroxyzine 25 mg every 4 hours as needed  Antidepressants/anxiolytics: Wellbutrin 300 mg every morning  Opioids: Dilaudid 3 mg every 6 hours, Dilaudid 2 mg every 4 hours as needed  IV Pain medication: None  Non-medication interventions: Ice, Rest, PT, OT, and Distraction (TV, Music, Reading)  Constipation Prophylaxis: MiraLAX twice daily, as needed senna docusate    -Opioid  prescriber has been none  -MN  pulled from system on 5/29/24. This indicates no opioids  Discharge Recommendations - We recommend prescribing the following at the time of discharge: Dilaudid 2 mg q 6 h prn, Lyrica, robaxin     History of Present Illness (HPI):       Mark Milton Traaseth is a 59 year old male who presented for acute back pain and left leg pain.  Past medical history as above. The pain is reported to be acute, located in the low back, and it does radiate to LLE.  Current pain is rated at 6-8/10 and goal is 2-4/10.  The patient denies nausea, vomiting, constipation, diarrhea, chest pain, shortness of breath, dizziness, fever, chills, saddle anesthesia, and changes to bowel or bladder habits. The patient reports paresthesia.     Per MN  review, the patient does not have an opioid tolerance. Opioid induced side effects noted and include: none    Reviewed medical record, labs, imaging, ED note, and care everywhere.       Home pain medications/psych medications/anticoagulation medications include: Wellbutrin, hydroxyzine, APAP     Medical History   PAST MEDICAL HISTORY: History reviewed.    PAST SURGICAL HISTORY:   Past Surgical History:   Procedure Laterality Date    IR LUMBAR/SACRAL TRANSFOR INJ BILATERAL Bilateral 5/28/2024       FAMILY HISTORY: History reviewed.     SOCIAL HISTORY:   Social History     Tobacco Use    Smoking status: Not on file    Smokeless tobacco: Not on file   Substance Use Topics    Alcohol use: Not on file        HEALTH & LIFESTYLE PRACTICES  Tobacco:  has no history on file for tobacco use.  Alcohol:  has no history on file for alcohol use.  Illicit drugs:  has no history on file for drug use.    Allergies  Allergies   Allergen Reactions    Codeine Itching    Hydromorphone Itching     Can take with benadryl    Morphine Itching     Can take with benadryl       Problem List  Patient Active Problem List    Diagnosis Date Noted    Intractable low back pain 05/25/2024      Priority: Medium       Prior to Admission Medications   Medications Prior to Admission   Medication Sig Dispense Refill Last Dose    allopurinol (ZYLOPRIM) 100 MG tablet Take 100 mg by mouth daily   ~ 1 weeks ago    amLODIPine (NORVASC) 10 MG tablet Take 10 mg by mouth daily   5/25/2024 at Am    aspirin 81 MG EC tablet Take 81 mg by mouth daily   5/25/2024 at AM    buPROPion (WELLBUTRIN XL) 300 MG 24 hr tablet Take 300 mg by mouth every morning   5/25/2024 at AM    carvedilol (COREG) 25 MG tablet Take 25 mg by mouth 2 times daily (with meals)   5/25/2024 at AM    CHERRY CONCENTRATE PO Take 2 tablets by mouth 3 times daily   5/25/2024 at AM    CHROMIUM PICOLINATE PO Take 1 tablet by mouth 2 times daily (before meals)   5/25/2024 at AM    DANDELION PO Take 1 tablet by mouth daily   5/25/2024 at AM    hydrALAZINE (APRESOLINE) 100 MG tablet Take 100 mg by mouth 2 times daily   5/25/2024 at AM    hydrOXYzine HCl (ATARAX) 10 MG tablet Take 10-20 mg by mouth 2 times daily as needed for itching   Unknown    insulin glargine (LANTUS PEN) 100 UNIT/ML pen Inject 100 Units Subcutaneous at bedtime   5/24/2024    insulin lispro (HUMALOG KWIKPEN) 100 UNIT/ML (1 unit dial) KWIKPEN Inject 0-12 Units Subcutaneous 3 times daily (before meals) Hasn't been using much, but if BG>190 he takes 12 units   Past Month    L-GLUTAMINE PO Take 5 g by mouth 2 times daily   5/25/2024 at AM    Levomefolic Acid (5-MTHF) 1 MG CAPS Take 1-7 mg by mouth daily   5/25/2024 at AM    levothyroxine (SYNTHROID/LEVOTHROID) 50 MCG tablet Take 50 mcg by mouth daily   5/25/2024 at AM    losartan (COZAAR) 100 MG tablet Take 100 mg by mouth daily   5/25/2024 at AM    MAGNESIUM GLYCINATE PO Take 2 capsules by mouth 2 times daily   5/25/2024 at AM    nitroGLYcerin (NITROSTAT) 0.4 MG sublingual tablet Place 0.4 mg under the tongue every 5 minutes as needed for chest pain For chest pain place 1 tablet under the tongue every 5 minutes for 3 doses. If symptoms persist 5  "minutes after 1st dose call 911.   Unknown    omeprazole (PRILOSEC) 20 MG DR capsule Take 20 mg by mouth daily   5/25/2024 at AM    pyridOXINE (VITAMIN B-6) 50 MG tablet Take 50 mg by mouth daily   5/25/2024 at AM    red yeast rice 600 MG CAPS Take 600 mg by mouth 2 times daily   5/25/2024 at AM    sildenafil (REVATIO) 20 MG tablet Take 100 mg by mouth daily as needed   Unknown    tirzepatide (MOUNJARO) 10 MG/0.5ML pen Inject 10 mg Subcutaneous every 7 days   5/24/2024    torsemide (DEMADEX) 20 MG tablet Take 40 mg by mouth daily   5/25/2024 at AM    valACYclovir (VALTREX) 1000 mg tablet Take 1,000 mg by mouth 2 times daily as needed X 1 day for cold sores       vitamin B complex with vitamin C (VITAMIN  B COMPLEX) tablet Take 1 tablet by mouth daily   5/25/2024 at AM       Review of Systems  Complete ROS reviewed, unless noted in HPI, all other systems reviewed (with patient) and all others found to be negative.      Objective:     Physical Exam:  BP (!) 152/68   Pulse 95   Temp 98.2  F (36.8  C) (Axillary)   Resp 18   Ht 1.88 m (6' 2\")   Wt 133 kg (293 lb 4.8 oz)   SpO2 95%   BMI 37.66 kg/m    Weight:   Vitals:    05/25/24 0832 05/26/24 0432 05/27/24 0345   Weight: 134.7 kg (297 lb) 130.8 kg (288 lb 4.8 oz) 133 kg (293 lb 4.8 oz)      Body mass index is 37.66 kg/m .    General Appearance:  Alert, cooperative, no distress, appears stated age    Head:  Normocephalic, without obvious abnormality, atraumatic   Eyes:  PERRL, conjunctiva/corneas clear, EOM's intact   ENT/Throat: Lips, mucosa, and tongue normal; teeth and gums normal   Lymph/Neck: Supple, symmetrical, trachea midline   Lungs:   Clear to auscultation bilaterally, respirations unlabored   Cardiovascular/Heart:  Regular rate and rhythm, S1, S2 normal,no murmur, rub or gallop.    Abdomen:   Soft, non-tender, bowel sounds active all four quadrants,  no masses, no organomegaly   Musculoskeletal: Extremities normal, atraumatic   Skin: Skin warm, ry  "   Neurologic: Alert and oriented X 3, Moves all 4 extremities     Psych: Affect is appropriate      Imaging: Reviewed I have personally reviewed pertinent notes, labs, tests, and radiologic imaging in patient's chart.  Labs: Reviewed I have personally reviewed pertinent notes, labs, tests, and radiologic imaging in patient's chart.  Notes: Reviewed I have personally reviewed pertinent notes, labs, tests, and radiologic imaging in patient's chart.    Total time spent 65 minutes with greater than 50% in consultation, education and coordination of care.   Also discussed with RN.   Treatment plan includes: multimodal pain approach, Hospital Medicine Service for medical management, NRS.   Patient educated regarding: multimodal pain approach and medications as listed above.   Elements of Medical Decision Making as described above. Acute or chronic illness or injury or surgery. High risk therapy including opioids, high risk drug therapy including oral and/or parenteral controlled substances.    Patient is understanding of the plan. All questions and concerns addressed to patient's satisfaction.     Thank you for this consultation.    INGRID CorleyP-C  Acute Care Pain Management Program   [facility]   Monday-Friday 8a-4p   Page via Epic or As[employer]co

## 2024-05-30 VITALS
HEART RATE: 56 BPM | HEIGHT: 74 IN | OXYGEN SATURATION: 98 % | BODY MASS INDEX: 37.64 KG/M2 | DIASTOLIC BLOOD PRESSURE: 78 MMHG | SYSTOLIC BLOOD PRESSURE: 150 MMHG | TEMPERATURE: 97.5 F | WEIGHT: 293.3 LBS | RESPIRATION RATE: 18 BRPM

## 2024-05-30 LAB
ANION GAP SERPL CALCULATED.3IONS-SCNC: 11 MMOL/L (ref 7–15)
BUN SERPL-MCNC: 48 MG/DL (ref 6–20)
CALCIUM SERPL-MCNC: 9.3 MG/DL (ref 8.6–10)
CHLORIDE SERPL-SCNC: 107 MMOL/L (ref 98–107)
CREAT SERPL-MCNC: 1.27 MG/DL (ref 0.67–1.17)
DEPRECATED HCO3 PLAS-SCNC: 21 MMOL/L (ref 22–29)
EGFRCR SERPLBLD CKD-EPI 2021: 66 ML/MIN/1.73M2
GLUCOSE BLDC GLUCOMTR-MCNC: 127 MG/DL (ref 70–99)
GLUCOSE BLDC GLUCOMTR-MCNC: 54 MG/DL (ref 70–99)
GLUCOSE SERPL-MCNC: 58 MG/DL (ref 70–99)
GLUCOSE SERPL-MCNC: 59 MG/DL (ref 70–99)
POTASSIUM SERPL-SCNC: 4.6 MMOL/L (ref 3.4–5.3)
SODIUM SERPL-SCNC: 139 MMOL/L (ref 135–145)

## 2024-05-30 PROCEDURE — 82947 ASSAY GLUCOSE BLOOD QUANT: CPT | Performed by: EMERGENCY MEDICINE

## 2024-05-30 PROCEDURE — 250N000013 HC RX MED GY IP 250 OP 250 PS 637: Performed by: EMERGENCY MEDICINE

## 2024-05-30 PROCEDURE — 250N000013 HC RX MED GY IP 250 OP 250 PS 637: Performed by: INTERNAL MEDICINE

## 2024-05-30 PROCEDURE — 99238 HOSP IP/OBS DSCHRG MGMT 30/<: CPT | Performed by: EMERGENCY MEDICINE

## 2024-05-30 PROCEDURE — 36415 COLL VENOUS BLD VENIPUNCTURE: CPT | Performed by: EMERGENCY MEDICINE

## 2024-05-30 PROCEDURE — 84295 ASSAY OF SERUM SODIUM: CPT | Performed by: EMERGENCY MEDICINE

## 2024-05-30 RX ORDER — ACETAMINOPHEN 500 MG
1000 TABLET ORAL 3 TIMES DAILY
Qty: 100 TABLET | Refills: 0 | Status: SHIPPED | OUTPATIENT
Start: 2024-05-30

## 2024-05-30 RX ORDER — HYDRALAZINE HYDROCHLORIDE 100 MG/1
100 TABLET, FILM COATED ORAL EVERY 8 HOURS
Qty: 90 TABLET | Refills: 0 | Status: SHIPPED | OUTPATIENT
Start: 2024-05-30

## 2024-05-30 RX ORDER — METHOCARBAMOL 500 MG/1
500 TABLET, FILM COATED ORAL EVERY 6 HOURS PRN
Qty: 60 TABLET | Refills: 0 | Status: ON HOLD | OUTPATIENT
Start: 2024-05-30 | End: 2024-07-11

## 2024-05-30 RX ORDER — CARVEDILOL 25 MG/1
25 TABLET ORAL 2 TIMES DAILY WITH MEALS
Qty: 60 TABLET | Refills: 0 | Status: SHIPPED | OUTPATIENT
Start: 2024-05-30

## 2024-05-30 RX ORDER — HYDROMORPHONE HYDROCHLORIDE 2 MG/1
3 TABLET ORAL EVERY 6 HOURS
Qty: 42 TABLET | Refills: 0 | Status: ON HOLD | OUTPATIENT
Start: 2024-05-30 | End: 2024-07-11

## 2024-05-30 RX ORDER — PREGABALIN 50 MG/1
50 CAPSULE ORAL 3 TIMES DAILY
Qty: 90 CAPSULE | Refills: 0 | Status: SHIPPED | OUTPATIENT
Start: 2024-05-30

## 2024-05-30 RX ORDER — TAMSULOSIN HYDROCHLORIDE 0.4 MG/1
0.4 CAPSULE ORAL DAILY
Qty: 30 CAPSULE | Refills: 0 | Status: ON HOLD | OUTPATIENT
Start: 2024-05-31 | End: 2024-08-10

## 2024-05-30 RX ADMIN — ALLOPURINOL 100 MG: 100 TABLET ORAL at 07:51

## 2024-05-30 RX ADMIN — PREGABALIN 50 MG: 50 CAPSULE ORAL at 09:12

## 2024-05-30 RX ADMIN — HYDROMORPHONE HYDROCHLORIDE 2 MG: 2 TABLET ORAL at 01:16

## 2024-05-30 RX ADMIN — AMLODIPINE BESYLATE 10 MG: 10 TABLET ORAL at 09:12

## 2024-05-30 RX ADMIN — BUPROPION HYDROCHLORIDE 300 MG: 150 TABLET, EXTENDED RELEASE ORAL at 07:51

## 2024-05-30 RX ADMIN — POLYETHYLENE GLYCOL 3350 17 G: 17 POWDER, FOR SOLUTION ORAL at 09:13

## 2024-05-30 RX ADMIN — TAMSULOSIN HYDROCHLORIDE 0.4 MG: 0.4 CAPSULE ORAL at 09:13

## 2024-05-30 RX ADMIN — HYDROMORPHONE HYDROCHLORIDE 3 MG: 2 TABLET ORAL at 09:12

## 2024-05-30 RX ADMIN — ACETAMINOPHEN 975 MG: 325 TABLET ORAL at 09:11

## 2024-05-30 RX ADMIN — LEVOTHYROXINE SODIUM 50 MCG: 0.05 TABLET ORAL at 07:51

## 2024-05-30 RX ADMIN — PANTOPRAZOLE SODIUM 40 MG: 40 TABLET, DELAYED RELEASE ORAL at 07:51

## 2024-05-30 RX ADMIN — HYDROMORPHONE HYDROCHLORIDE 3 MG: 2 TABLET ORAL at 05:13

## 2024-05-30 RX ADMIN — HYDRALAZINE HYDROCHLORIDE 100 MG: 50 TABLET, FILM COATED ORAL at 05:14

## 2024-05-30 ASSESSMENT — ACTIVITIES OF DAILY LIVING (ADL)
ADLS_ACUITY_SCORE: 23

## 2024-05-30 NOTE — PLAN OF CARE
Problem: Adult Inpatient Plan of Care  Goal: Optimal Comfort and Wellbeing  Outcome: Adequate for Care Transition     Problem: Pain Acute  Goal: Optimal Pain Control and Function  Outcome: Adequate for Care Transition   Goal Outcome Evaluation:             Pt alert and oriented times 4. VSS. Pain managed with PO medication. Patient able to ambulate and void. Morning blood sugar was 54. Gave juice and crackers. Rechecked and blood sugar was 127. MD notified. Insulin was modified. Patient likes to get out of bed and turn off bed alarm. Patient educated on the importance of calling RN and NA if patient needs to get out of bed or chair to promote safety. Patient wheeled to discharge lounge. Discharge RN taken over for care.

## 2024-05-30 NOTE — PLAN OF CARE
DISCHARGE LOUNGE NOTE    Patient discharged to home at 10:10 AM via wheel chair. Accompanied by spouse and staff. Discharge instructions reviewed with patient and spouse, opportunity offered to ask questions. Prescriptions sent to patients preferred pharmacy. All belongings sent with patient. All questions answered. PIV removed. Wife to transport     Alexis Elena RN

## 2024-05-30 NOTE — PROGRESS NOTES
Care Management Discharge Note    Discharge Date: 05/30/2024       Discharge Disposition: Home    Discharge Services: None    Discharge DME: None    Discharge Transportation: family or friend will provide    Education Provided on the Discharge Plan: Yes (AVS per bedside RN)    Persons Notified of Discharge Plans: patient    Patient/Family in Agreement with the Plan: yes         Additional Information:  CM reviewed chart. No CM needs identified or requested. Family to provide transportation home at discharge.         Sheree Roblero RN

## 2024-05-30 NOTE — PROGRESS NOTES
Will not see today:  PAIN MANAGEMENT SERVICE CHART CHECK    This patient's chart has been reviewed by the Pain Service. It has been determined that no change is necessary to the pain management regimen at this time. Plans for discharge. Pain team will sign off. Discussed with Hospital Medicine Service. If you would like the patient to be seen, please contact the service and ask to have the patient seen or re-consult.    Thank you!      INGRID CorleyP-C  Acute Care Pain Management Program [facility]   Monday-Friday 8a-4p   Page via Epic or Vapps

## 2024-05-30 NOTE — PLAN OF CARE
Problem: Adult Inpatient Plan of Care  Goal: Absence of Hospital-Acquired Illness or Injury  Intervention: Identify and Manage Fall Risk  Recent Flowsheet Documentation  Taken 5/30/2024 0000 by Chun Coe RN  Safety Promotion/Fall Prevention:   safety round/check completed   room near nurse's station   room door open   nonskid shoes/slippers when out of bed   mobility aid in reach   assistive device/personal items within reach   Goal Outcome Evaluation:      Plan of Care Reviewed With: patient    Overall Patient Progress: improvingOverall Patient Progress: improving  Pt remains alert and oriented, vss on room air, can voice needs, he is SBA, pain mild, for discharge today.

## 2024-05-30 NOTE — DISCHARGE SUMMARY
[facility] MEDICINE  DISCHARGE SUMMARY     Primary Care Physician: True Nogueira  Admission Date: 5/25/2024   Discharge Provider: Shyann Elizondo MD Discharge Date: 5/30/2024   Diet:   Active Diet and Nourishment Order   Procedures     Moderate Consistent Carb (60 g CHO per Meal) Diet       Code Status: Full Code   Activity: DCACTIVITY: Activity as tolerated        Condition at Discharge: Stable     REASON FOR PRESENTATION(See Admission Note for Details)     LBP with L4 radiculopathy    PRINCIPAL & ACTIVE DISCHARGE DIAGNOSES      Acute LBP with L3-4 radiculopathy  DM2, insulin dependent  HTN, essential  Obesity  JANAK, history of  Urinary retention due to acute pain and bph  ADHD, depression, anxiety    PENDING LABS     Unresulted Labs Ordered in the Past 30 Days of this Admission       No orders found from 4/25/2024 to 5/26/2024.              PROCEDURES ( this hospitalization only)      JUAN injection    RECOMMENDATIONS TO OUTPATIENT PROVIDER FOR F/U VISIT     Follow-up Appointments     Follow-up and recommended labs and tests       Please follow up at [facility] if no improvement or   worsening of symptoms within 2 weeks from injection date (6/11/2024).    Please call our schedulers at [phone] to schedule your appointment or   for any questions or concerns.                DISPOSITION     Discharge home    SUMMARY OF HOSPITAL COURSE:      59 old male into [facility] on 5/25/2024 after arriving to the ER with  Acute LBP and left leg pain.      PMHx includes HTN, diabetes mellitus type 2, insulin dependent, obesity,  JANAK, ADHD, depression, gout    On arrival to the ER he was in a significant amount of pain after spending  6 days standing at a food show.  He was neurologically intact.   He had  Severe pain in the back and left leg.  MRI imaging on arrival showed no  Evidence of cauda equina though at L3-4 he had a left sided extrusion causing left  lateral recess stenosis and impingement on the descending  Left L4 nerve.  He had a prior lumbar laminectomy about 15 years prior.    Pts hospital course is marked by clinical support for his pain.  He was given iv dilaudid, dexamethasone, valium, lyrica.  The NS team consulted  On the case.  He had some urinary retention due to his pain and underlying bph; a anderson was used.  He had moderate clinical relief after   48-72 hours of dexamethasone and clinical support.      The NS team recommended an JUAN injection which the patient received on  5/28.  He continued to show clinical improvment. There are a few issues that I hope we educated him through.  The patient has a tendency to make  Adjustments to his mediations that are not in his best interest.  The blood pressure was poorly managed.  I restored his proper dosing of coreg,  Hydralazine.  I also believe he should be on twice daily lantus which I see  He has done in the past.  (Lantus 50 units twice daily ).  At discharge I reiterated the very notion that he should have better control of both his  Blood pressure and diabetes.  He seems interested though makes his  Own adjustments to medications with improper doses.  He passed the  TOV at discharge.     At discharge he is walking with a walker.  I do hope he continues to make  Clinical strides; with less pain.  If the steroid shot does not give him  Sufficient relief he may require surgery though a 50# weight loss would be  The better option.  He refuses CPAP during stay and historically.      Pt is discharged on lyrica, hydralazine, cored, robaxin if needed, coreg,  Flomax.      The NS team will set up follow up.  He was encouraged to keep a blood  Pressure diary for ongoing values; to bring this into the primary care clinic   Office in follow up.       Discharge Medications with Med changes:     Current Discharge Medication List        CONTINUE these medications which have NOT CHANGED    Details   allopurinol  (ZYLOPRIM) 100 MG tablet Take 100 mg by mouth daily      amLODIPine (NORVASC) 10 MG tablet Take 10 mg by mouth daily      aspirin 81 MG EC tablet Take 81 mg by mouth daily      buPROPion (WELLBUTRIN XL) 300 MG 24 hr tablet Take 300 mg by mouth every morning      carvedilol (COREG) 25 MG tablet Take 25 mg by mouth 2 times daily (with meals)      CHERRY CONCENTRATE PO Take 2 tablets by mouth 3 times daily      CHROMIUM PICOLINATE PO Take 1 tablet by mouth 2 times daily (before meals)      DANDELION PO Take 1 tablet by mouth daily      hydrALAZINE (APRESOLINE) 100 MG tablet Take 100 mg by mouth 2 times daily      hydrOXYzine HCl (ATARAX) 10 MG tablet Take 10-20 mg by mouth 2 times daily as needed for itching      insulin glargine (LANTUS PEN) 100 UNIT/ML pen Inject 100 Units Subcutaneous at bedtime      insulin lispro (HUMALOG KWIKPEN) 100 UNIT/ML (1 unit dial) KWIKPEN Inject 0-12 Units Subcutaneous 3 times daily (before meals) Hasn't been using much, but if BG>190 he takes 12 units      L-GLUTAMINE PO Take 5 g by mouth 2 times daily      Levomefolic Acid (5-MTHF) 1 MG CAPS Take 1-7 mg by mouth daily      levothyroxine (SYNTHROID/LEVOTHROID) 50 MCG tablet Take 50 mcg by mouth daily      losartan (COZAAR) 100 MG tablet Take 100 mg by mouth daily      MAGNESIUM GLYCINATE PO Take 2 capsules by mouth 2 times daily      nitroGLYcerin (NITROSTAT) 0.4 MG sublingual tablet Place 0.4 mg under the tongue every 5 minutes as needed for chest pain For chest pain place 1 tablet under the tongue every 5 minutes for 3 doses. If symptoms persist 5 minutes after 1st dose call 911.      omeprazole (PRILOSEC) 20 MG DR capsule Take 20 mg by mouth daily      pyridOXINE (VITAMIN B-6) 50 MG tablet Take 50 mg by mouth daily      red yeast rice 600 MG CAPS Take 600 mg by mouth 2 times daily      sildenafil (REVATIO) 20 MG tablet Take 100 mg by mouth daily as needed      tirzepatide (MOUNJARO) 10 MG/0.5ML pen Inject 10 mg Subcutaneous every 7  "days      torsemide (DEMADEX) 20 MG tablet Take 40 mg by mouth daily      valACYclovir (VALTREX) 1000 mg tablet Take 1,000 mg by mouth 2 times daily as needed X 1 day for cold sores      vitamin B complex with vitamin C (VITAMIN  B COMPLEX) tablet Take 1 tablet by mouth daily                       Consults       NEUROSURGERY IP CONSULT  PHYSICAL THERAPY ADULT IP CONSULT  INTERVENTIONAL RADIOLOGY ADULT/PEDS IP CONSULT  PAIN MANAGEMENT ADULT IP CONSULT    Immunizations given this encounter     Most Recent Immunizations   Administered Date(s) Administered     COVID-19 Monovalent 18+ (Moderna) 07/14/2021           Anticoagulation Information      Recent INR results: No results for input(s): \"INR\" in the last 168 hours.  Warfarin doses (if applicable) or name of other anticoagulant:       SIGNIFICANT IMAGING FINDINGS     Results for orders placed or performed during the hospital encounter of 05/25/24   Lumbar spine MRI w/o contrast    Impression    IMPRESSION:  1.  Unremarkable cord and cauda equina nerve roots.  2.  No high-grade spinal canal stenosis.  3.  At L3-L4, there is a left-sided extrusion which contributes to left lateral recess stenosis and impingement upon the descending left L4 nerve.  4.  At L4-L5, there is a central disc herniation without high-grade stenosis of the spinal canal or neural foramina. Prior right hemilaminotomy.  5.  At L5-S1, there is moderate bilateral neural foraminal stenosis.       IR Lumbar Sacral Transfor Injection Bilateral    Impression    CONCLUSION:     1. Technically successful fluoroscopically-guided percutaneous left transforaminal L3-4 epidural steroid injection.    Pain severity:  Pre-procedure: 2/10  Post-procedure: 0/10  _____________________________________________    CPT codes included for physician reference only:   Lumbar/Sacral: 64483 x1             Discharge Orders        Follow-up and recommended labs and tests     Please follow up at [facility] " if no improvement or worsening of symptoms within 2 weeks from injection date (6/11/2024).  Please call our schedulers at [phone] to schedule your appointment or for any questions or concerns.     Activity    Discharge instructions:  Please limit lifting of no more than 10 pounds, bending, twisting, jarring motions.     Okay to walk as tolerated. Avoid bedrest.       Examination   Physical Exam   Temp:  [97.4  F (36.3  C)-98.3  F (36.8  C)] 97.5  F (36.4  C)  Pulse:  [54-60] 56  Resp:  [16-18] 18  BP: (138-175)/(62-84) 150/78  SpO2:  [96 %-98 %] 97 %  Wt Readings from Last 1 Encounters:   05/27/24 133 kg (293 lb 4.8 oz)       GENERAL:  Alert, looking much improved; no distress   HEAD:  Normocephalic, without obvious abnormality, atraumatic   EYES:  PERRL, conjunctiva/corneas clear, no scleral icterus, EOM's intact   NOSE: Nares normal, septum midline, mucosa normal, no drainage   THROAT: Lips, mucosa, and tongue normal; teeth and gums normal, mouth moist   NECK: Supple, symmetrical, trachea midline   BACK:   Symmetric, no curvature, ROM normal   LUNGS:   Clear to auscultation bilaterally, no rales, rhonchi, or wheezing, symmetric chest rise on inhalation, respirations unlabored   CHEST WALL:  No tenderness or deformity   HEART:  Regular rate and rhythm, S1 and S2 normal, no murmur, rub, or gallop    ABDOMEN:   Soft, non-tender, bowel sounds active all four quadrants, no masses, no organomegaly, no rebound or guarding   EXTREMITIES: Extremities normal, atraumatic, no cyanosis or edema    SKIN: Dry to touch, no exanthems in the visualized areas   NEURO: Alert, walks freely with walker, nonfocal   PSYCH: Cooperative, behavior is appropriate           Please see EMR for more detailed significant labs, imaging, consultant notes etc.      Shyann Elizondo MD  [facility]    CC:True Nogueira

## 2024-05-30 NOTE — PLAN OF CARE
Physical Therapy Discharge Summary    Reason for therapy discharge:    Discharged to home.    Progress towards therapy goal(s). See goals on Care Plan in [facility] electronic health record for goal details.  Goals not met.  Barriers to achieving goals:   discharge from facility.    Therapy recommendation(s):    Continued therapy is recommended.  Rationale/Recommendations:  continued PT to improve functional mobility.  Continue home exercise program.

## 2024-05-30 NOTE — PROGRESS NOTES
Patient is A & O x 4. SBA with walker gait belt. VSS, on RA. Pt reports pain in low back, hip, knee and through left leg. Utilizing scheduled pain meds and PRN robaxin. Pt has active bowel sounds, passing gas, last BM today. Voiding without difficulty. Springer pulled on AM shifts, passed 3/3 PVRs. L PIV saline locked. Tolerating a 60g carb count diet.

## 2024-06-14 ENCOUNTER — TELEPHONE (OUTPATIENT)
Dept: NEUROSURGERY | Facility: CLINIC | Age: 59
End: 2024-06-14
Payer: COMMERCIAL

## 2024-06-14 NOTE — TELEPHONE ENCOUNTER
Message was left for patient to call and schedule a follow up visit from the ED. Patient does not have a C2C on file and therefore will need to make his own appointments.     Patient has MRI in chart and can be scheduled with a provider/location of patient preference.

## 2024-06-14 NOTE — TELEPHONE ENCOUNTER
[facility] Call Center    Phone Message    May a detailed message be left on voicemail: yes     Reason for Call: Pt was seen at [facility].  Pt's wife called to schedule a hospital follow up.  Please call her back to schedule.  Thanks.

## 2024-06-17 NOTE — CONFIDENTIAL NOTE
NEUROSURGERY- NEW PREVISIT PLANNING       Record Status/Location     Referring Provider ED Note   Shyann Elizondo MD    Diagnosis ED Note Acute LBP with L3-4 radiculopathy    MRI (HEAD, NECK, SPINE) Pacs Lumbar 5/28/24 [facility]    CT Pacs Lumbar 7/22/23 [facility]   X-ray Pacs Hip 6/20/23 [facility]   INJECTION Img 5/28/24- fluoroscopically-guided percutaneous left transforaminal L3-4 epidural steroid injection.    PHYSICAL THERAPY Na    SURGERY Na

## 2024-06-17 NOTE — CONFIDENTIAL NOTE
Action P [phone]   Emergency Room    Action Taken Requesting CT Lumbar 7/22/23 pushed to  PACS and Hip XR 6/20/23   Status: being pushed  Loaded in by writer

## 2024-06-18 ENCOUNTER — OFFICE VISIT (OUTPATIENT)
Dept: NEUROSURGERY | Facility: CLINIC | Age: 59
End: 2024-06-18
Payer: COMMERCIAL

## 2024-06-18 ENCOUNTER — PRE VISIT (OUTPATIENT)
Dept: NEUROSURGERY | Facility: CLINIC | Age: 59
End: 2024-06-18

## 2024-06-18 VITALS
BODY MASS INDEX: 37.65 KG/M2 | HEIGHT: 74 IN | SYSTOLIC BLOOD PRESSURE: 140 MMHG | HEART RATE: 63 BPM | DIASTOLIC BLOOD PRESSURE: 71 MMHG | OXYGEN SATURATION: 97 % | WEIGHT: 293.4 LBS

## 2024-06-18 DIAGNOSIS — Z01.818 PRE-OP TESTING: Primary | ICD-10-CM

## 2024-06-18 DIAGNOSIS — Z79.4 TYPE 2 DIABETES MELLITUS WITHOUT COMPLICATION, WITH LONG-TERM CURRENT USE OF INSULIN (H): ICD-10-CM

## 2024-06-18 DIAGNOSIS — E11.9 TYPE 2 DIABETES MELLITUS WITHOUT COMPLICATION, WITH LONG-TERM CURRENT USE OF INSULIN (H): ICD-10-CM

## 2024-06-18 DIAGNOSIS — M54.16 LUMBAR RADICULOPATHY: ICD-10-CM

## 2024-06-18 PROCEDURE — 99215 OFFICE O/P EST HI 40 MIN: CPT | Performed by: NEUROLOGICAL SURGERY

## 2024-06-18 PROCEDURE — 99417 PROLNG OP E/M EACH 15 MIN: CPT | Performed by: NEUROLOGICAL SURGERY

## 2024-06-18 RX ORDER — LOSARTAN POTASSIUM 50 MG/1
50 TABLET ORAL DAILY
COMMUNITY
Start: 2024-06-17 | End: 2025-06-17

## 2024-06-18 ASSESSMENT — PAIN SCALES - GENERAL: PAINLEVEL: SEVERE PAIN (7)

## 2024-06-18 NOTE — PATIENT INSTRUCTIONS
Patient Instructions    Surgery scheduled at [facility] for lumbar decompression with Dr. Price    Pre-Operative    Surgical risks: blood clots in the leg or lung, problems urinating, nerve damage, drainage from the incision, infection, stiffness  Pre-operative physical with primary care physician within 30 days of surgical date.  Schedule with cardiology for pre-op clearance.  Stop Aspirin 10 days prior to surgery.  Likely same day procedure with discharge home day of surgery, may stay for 23 hour observation hospitalization for monitoring.  Shower procedure  Please shower with antimicrobial soap the night before surgery and morning of surgery. Please refer to showering instruction sheet in folder.  Eating/Drinking  Stop all solid foods 8 hours before surgery.  Keep drinking clear liquids until 4 hours before surgery  Clear liquids include water, clear juice, black coffee, or clear tea without milk, Gatorade, clear soda.  Medications  Hold Aspirin, NSAIDs (Advil/Ibuprofen, Indocin, Naproxen, Nuprin, Relafen/Nabumetone, Diclofenac, Meloxicam, Aleve, Celebrex) x 10 days prior to surgical date  You can take Tylenol (Acetaminophen) for pain, 1000 mg  Do not exceed 2400 mg per day  If you are on chronic pain medication (oxycodone, Percocet, hydrocodone, Vicodin, Norco, Dilaudid, morphine, MS Contin, naltrexone, Suboxone, etc) or have a pain contract we will reach out to your pain clinic to gather your most recent records and recommendations for pain management post-op. Please ask your provider who manages your chronic pain if they require you to schedule an office visit prior to surgery. Continue obtaining your pain medications from your current provider until surgery. Our team will manage your acute post-op pain in the hospital and during the recovery period. Your pain team will continue to manage your chronic pain.  Any other medications prescribed, please discuss with your primary care provider at  your pre-operative physical    You may NOT receive the COVID-19 vaccine 72 hours before or after surgery.    Pain Management    You will have post-operative incisional pain which may require pain medications and muscle relaxants. You will receive medication upon discharge.  We can provide pain medications for 2 weeks post-op. If you need refills after 2 weeks post-op, please contact your primary care provider team.   You may resume taking NSAIDs (ex. Ibuprofen, aleve, naproxen) 2 weeks after surgery.  Do NOT drive while taking narcotic pain medication  Do NOT drink alcohol while using any pain medication  You can utilize ice as needed (no longer than 20 minutes at one time)    Incision Care    No submerging incision in water such as pools, hot tubs, baths for at least 8 weeks or until incision is healed  It is okay to shower after 48 hours, just pat the incision dry  Remove dressing as instructed upon discharge  Watch for signs of infection  Redness, swelling, warmth, drainage, and fever of 101 degrees or higher  [facility] [phone].    Activity Restrictions    For the first 6-8 weeks, no lifting > 10 pounds, limited bending, twisting, or overhead reaching.  Take stairs in moderation  Ok to walk as tolerated, take short frequent walks. You may gradually increase the distance as tolerated.  Avoid bed rest and prolonged sitting for longer than 30 minutes (change positions frequently while awake)  No contact sports until after follow up visit  No high impact activities such as; running/jogging, snowmobile or 4 fernandes riding or any other recreational vehicles  Please call the clinic if you develop any of the following symptoms:  Swelling and/or warmth in one or both legs  Pain or tenderness in your leg, ankle, foot, or arm  Red or discolored skin    Post-Op Follow Up Appointments      2 week incision check with RN    6 week post op follow up visit with Physician Assistant    3 months post op with Dr. Price     Please call to schedule follow up appointment at [phone]    Resources      If you are currently employed, you will need to be off work for 2-4 weeks for post op recovery and healing.    Please fax any FMLA/short term disability paperwork to [phone]    You may call our clinic when you'd like to return to work and we can provide a work letter    To allow staff adequate time to complete paperwork, please send as soon as possible    [facility]  Phone: [phone]  Fax: [phone]

## 2024-06-18 NOTE — PROGRESS NOTES
NEUROSURGERY CONSULTATION NOTE    Neurosurgery was asked to see this patient by No att. providers found for evaluation of left leg pain and weakness likely along left L4 dermatomal distribution    CONSULTATION ASSESSMENT AND PLAN:    Mr. Traaseth is a 59-year-old right-handed gentleman with significant past medical history of diabetes mellitus type 2 insulin-dependent last HbA1c of 7.5, hypertension, coronary artery disease s/p coronary stents x 2 on 5/11/2021 on 81 mg aspirin, obesity BMI of 37.67, JANAK, ADHD, depression, anxiety, spinal stenosis s/p lumbar 4 lumbar 5 decompression 20 years ago, initially presented to the [facility] from 5/25/2024 to 5/30/2024 with acute onset low back pain and left leg pain likely along left L4 dermatomal distribution.  He also underwent left L3-4 transforaminal epidural steroid injection on 5/28/2024 with minimal to no improvement.  He is in the clinic today with his spouse to discuss further management.  Since the discharge patient has developed weakness involving his left thigh to the point that he is ambulating with a walker.  He has difficulty lifting his left leg off the ground.  He has weakness involving left L3 and L4 dermatome.  He has impaired tandem walking and antalgic gait.    I discussed the MRI lumbar spine with the patient and his spouse and showed them the images.  I explained to them that he has degenerative disc disease with HNP at multiple levels primarily at lumbar 3 lumbar 4 causing lateral recess stenosis on the left which is likely contributing to his symptoms.  I discussed the natural history of degenerative disc disease and also discussed the management options including conservative treatment with physical therapy, non surgical interventions, decompression alone and decompression with fusion.    Given new onset weakness and failure of conservative management I would recommend minimally invasive left lumbar 3 lumbar 4 lateral recess decompression.   Further physical therapy or conservative management is unlikely to help the patient with the symptoms of new onset weakness.  Therefore I would recommend decompression of L3-4 lateral recess stenosis.      I discussed the risk and benefits of the surgery including but not limited to bleeding, infection, injury to the thecal sac, CSF leak, injury to the nerve root, incomplete relief of symptoms, spine instability requiring further fusion surgery, MI/DVT/PE, need for prolonged rehabilitation and others.    I explained to the patient that we need clearance from his cardiologist and primary care physician for the surgery.  He will also need to stop aspirin 10 days prior to the surgery.  We will schedule the surgery and give him a call.  He can continue physical therapy in the interim.    Patient agreed with the plan.  All the questions were answered and patient sounded understanding.  He can contact us if there are any further questions or concerns or worsening neurological deficits.I spent more than 60 minutes in this apt, examining the pt, reviewing the scans, reviewing notes from chart, discussing treatment options with risks and benefits and coordinating care. This note was created in part by the use of Dragon voice recognition system. Inadvertent grammatical errors and typographical errors may have occurred due to inherent limitation of voice recognition software.  Reasonable attempts made to avoid errors, but this document may contain an error not identified before finalizing.  Please contact me for any clarification needed.     Dom Price MD      HPI:    Mr. Traaseth is a 59-year-old right-handed gentleman with significant past medical history of diabetes mellitus type 2 insulin-dependent last HbA1c of 7.5, hypertension, coronary artery disease s/p coronary stents x 2 on 5/11/2021 on 81 mg aspirin, obesity BMI of 37.67, JANAK, ADHD, depression, anxiety, spinal stenosis s/p lumbar 4 lumbar 5 decompression 20  years ago, [facility] to 5/30/2024 with acute onset low back pain and left leg pain likely along left L4 dermatomal distribution.  He also underwent left L3-4 transforaminal epidural steroid injection on 5/28/2024 with minimal to no improvement.  He is in the clinic today with his spouse to discuss further management.    Patient developed acute onset low back pain radiating to his left lower extremity without preceding trauma 2 days prior to the admission.  He went to the ER and was admitted to the hospital.  He reports that his pain was radiating from his low back to the left lateral hip to left anterolateral thigh to the knee and intermittently to the ankle and foot.  He also reports tingling and numbness along the same distribution.  Patient had no weakness during the admission.  He underwent transforaminal epidural steroid injection with no significant benefit.  Patient had no bladder or bowel symptoms during the admission.    Since the discharge patient had a fall at home since his knee was giving way.  He underwent x-ray knee on 6/17/2024 which was negative for any fractures.  Patient reports that his left leg is weak since the discharge.  He is not able to lift his leg properly and requires considerable support to ambulate.  He is ambulating with a walker at this point.  He reports that his pain has improved a bit since the discharge.  His pain level during clinic visit is 4 x 10, sharp in nature, aggravated with movement and relieved with opioid medications.  He denies any new onset bladder or bowel symptoms.    He has a long history of lumbar spinal stenosis and right lower extremity pain for which he underwent lumbar 4 lumbar 5 decompression 20 years ago.  He had a rough recovery after the surgery with incomplete resolution of his symptoms which took almost a year to resolve.    He does not smoke, denies use of alcohol and tried THC Gummies recently for his  pain.  He denies any other significant cardiac or pulm history.  He is on 81 mg aspirin not on anticoagulants.      No past medical history on file.    Past Surgical History:   Procedure Laterality Date    IR LUMBAR/SACRAL TRANSFOR INJ BILATERAL Bilateral 5/28/2024       REVIEW OF SYSTEMS:  Review Of Systems  Skin: negative  Eyes: negative  Ears/Nose/Throat: negative  Respiratory: No shortness of breath, dyspnea on exertion, cough, or hemoptysis  Cardiovascular: negative  Gastrointestinal: negative  Genitourinary: negative  Musculoskeletal: Left leg pain and weakness  Neurologic: negative  Psychiatric: negative  Hematologic/Lymphatic/Immunologic: negative  Endocrine: negative    MEDICATIONS:    Current Outpatient Medications   Medication Sig Dispense Refill    acetaminophen (TYLENOL) 500 MG tablet Take 2 tablets (1,000 mg) by mouth 3 times daily 100 tablet 0    allopurinol (ZYLOPRIM) 100 MG tablet Take 100 mg by mouth daily      amLODIPine (NORVASC) 10 MG tablet Take 10 mg by mouth daily      aspirin 81 MG EC tablet Take 81 mg by mouth daily      buPROPion (WELLBUTRIN XL) 300 MG 24 hr tablet Take 300 mg by mouth every morning      carvedilol (COREG) 25 MG tablet Take 1 tablet (25 mg) by mouth 2 times daily (with meals) 60 tablet 0    CHERRY CONCENTRATE PO Take 2 tablets by mouth 3 times daily      CHROMIUM PICOLINATE PO Take 1 tablet by mouth 2 times daily (before meals)      DANDELION PO Take 1 tablet by mouth daily      hydrALAZINE (APRESOLINE) 100 MG tablet Take 1 tablet (100 mg) by mouth every 8 hours 90 tablet 0    HYDROmorphone (DILAUDID) 2 MG tablet Take 1.5 tablets (3 mg) by mouth every 6 hours 42 tablet 0    hydrOXYzine HCl (ATARAX) 10 MG tablet Take 10-20 mg by mouth 2 times daily as needed for itching      insulin glargine (LANTUS PEN) 100 UNIT/ML pen Inject 15 Units Subcutaneous every morning 15 mL 0    insulin glargine (LANTUS PEN) 100 UNIT/ML pen Inject 100 Units Subcutaneous at bedtime       L-GLUTAMINE PO Take 5 g by mouth 2 times daily      Levomefolic Acid (5-MTHF) 1 MG CAPS Take 1-7 mg by mouth daily      levothyroxine (SYNTHROID/LEVOTHROID) 50 MCG tablet Take 50 mcg by mouth daily      losartan (COZAAR) 50 MG tablet Take 50 mg by mouth daily      MAGNESIUM GLYCINATE PO Take 2 capsules by mouth 2 times daily      methocarbamol (ROBAXIN) 500 MG tablet Take 1 tablet (500 mg) by mouth every 6 hours as needed for muscle spasms 60 tablet 0    nitroGLYcerin (NITROSTAT) 0.4 MG sublingual tablet Place 0.4 mg under the tongue every 5 minutes as needed for chest pain For chest pain place 1 tablet under the tongue every 5 minutes for 3 doses. If symptoms persist 5 minutes after 1st dose call 911.      omeprazole (PRILOSEC) 20 MG DR capsule Take 20 mg by mouth daily      pregabalin (LYRICA) 50 MG capsule Take 1 capsule (50 mg) by mouth 3 times daily 90 capsule 0    pyridOXINE (VITAMIN B-6) 50 MG tablet Take 50 mg by mouth daily      red yeast rice 600 MG CAPS Take 600 mg by mouth 2 times daily      sildenafil (REVATIO) 20 MG tablet Take 100 mg by mouth daily as needed      tamsulosin (FLOMAX) 0.4 MG capsule Take 1 capsule (0.4 mg) by mouth daily 30 capsule 0    tirzepatide (MOUNJARO) 10 MG/0.5ML pen Inject 10 mg Subcutaneous every 7 days      torsemide (DEMADEX) 20 MG tablet Take 40 mg by mouth daily      valACYclovir (VALTREX) 1000 mg tablet Take 1,000 mg by mouth 2 times daily as needed X 1 day for cold sores      vitamin B complex with vitamin C (VITAMIN  B COMPLEX) tablet Take 1 tablet by mouth daily      insulin lispro (HUMALOG KWIKPEN) 100 UNIT/ML (1 unit dial) KWIKPEN Inject 0-12 Units Subcutaneous 3 times daily (before meals) Hasn't been using much, but if BG>190 he takes 12 units (Patient not taking: Reported on 6/18/2024)           ALLERGIES/SENSITIVITIES:     Allergies   Allergen Reactions    Codeine Itching    Hydromorphone Itching     Can take with benadryl    Morphine Itching     Can take with  "benadryl       PERTINENT SOCIAL HISTORY: Non-smoker  Social History     Socioeconomic History    Marital status:      Spouse name: None    Number of children: None    Years of education: None    Highest education level: None   Tobacco Use    Smoking status: Never    Smokeless tobacco: Never     Social Determinants of Health     Financial Resource Strain: Low Risk  (6/7/2019)    Received from [facility]    Overall Financial Resource Strain (CARDIA)     Difficulty of Paying Living Expenses: Not very hard   Food Insecurity: No Food Insecurity (6/7/2019)    Received from [facility]    Hunger Vital Sign     Worried About Running Out of Food in the Last Year: Never true     Ran Out of Food in the Last Year: Never true   Transportation Needs: No Transportation Needs (6/7/2019)    Received from [facility]    PRAPARE - Transportation     Lack of Transportation (Medical): No     Lack of Transportation (Non-Medical): No   Physical Activity: Inactive (6/7/2019)    Received from [facility]    Exercise Vital Sign     Days of Exercise per Week: 0 days     Minutes of Exercise per Session: 0 min   Stress: Stress Concern Present (7/22/2019)    Received from [facility]    [employer] - Occupational Stress Questionnaire     Feeling of Stress : To some extent   Social Connections: Socially Integrated (7/22/2019)    Received from [facility]    Social Connection and Isolation Panel [NHANES]     Frequency of Communication with Friends and Family: Twice a week     Frequency of Social Gatherings with Friends and Family: Once a week     Attends [employer] Services: More than 4 times per year     Active Member of Clubs or Organizations: Yes     Attends Club or Organization Meetings: More than 4 times per year     Marital Status:          FAMILY HISTORY:  No family history on file.     PHYSICAL EXAM:   Constitutional: BP (!) 140/71   Pulse 63   Ht 6' 2\" (1.88 m)   Wt 293 lb 6.4 oz (133.1 kg)   " SpO2 97%   BMI 37.67 kg/m       Mental Status: A & O in no acute distress.  Affect is appropriate.  Speech is fluent.  Recent and remote memory are intact.  Attention span and concentration are normal.        Cranial Nerves:   CN1: grossly intact per patient recall.   CN2: No funduscopic exam performed.   CN3,4 & 6: Pupillary light response, lateral and vertical gaze normal.  No nystagmus.  Visual fields are full to confrontation.   CN5: Intact to touch   CN7: No facial weakness, smile, facial symmetry intact.   CN8: Intact to spoken voice.   CN9&10: Gag reflex, uvula midline, palate rises with phonation.   CN11: Shoulder shrug 5/5 intact bilaterally.   CN12: Tongue midline and moves freely from side to side.     Motor: No pronator drift of upper extremity.   Normal bulk and tone all muscle groups of upper and lower extremities.       Delt Bi Tri Hand Flex/  Ext Iliopsoas Quadriceps Tibialis Anterior EHL Gastroc     C5 C6 C7 C8/T1 L2 L3 L4 L5 S1   R 5 5 5 5 5 5 5 - 5   L 5 5 5 5 5 -4 -4 - 5   Patient has missing toes on and missing pinky toe on the left secondary to uncontrolled diabetes right side.       Sensory: Sensation intact bilaterally to light touch.     Coordination; finger to nose, rapid alternating movements smooth and rhythmic.   Romberg impaired  Heel/toe/tandem gait impaired  Patient ambulating with a walker     Reflexes;                       Right              Left  Brachioradialis (C5,6)      2+                 2+  Biceps   (C5,6)                 2+                 2+  Triceps  (C7,8)                 2+                2+  Knee (L3,4)                      2+                2+  Ankle jerk (S1,2)              1+                1+      No hoffmans/babinski/ clonus.  FABERS/Berhane test bilaterally positive left more than the right  SLR restricted on the left  No lower lumbar paraspinal tenderness.       IMAGING:  I personally reviewed all radiographic images and agree with the radiology report of left  L3-4 lateral recess stenosis with HNP.     5/25/2024 MRI lumbar spine:  IMPRESSION:  1.  Unremarkable cord and cauda equina nerve roots.  2.  No high-grade spinal canal stenosis.  3.  At L3-L4, there is a left-sided extrusion which contributes to left lateral recess stenosis and impingement upon the descending left L4 nerve.  4.  At L4-L5, there is a central disc herniation without high-grade stenosis of the spinal canal or neural foramina. Prior right hemilaminotomy.  5.  At L5-S1, there is moderate bilateral neural foraminal stenosis.       Cc:   True Nogueira R

## 2024-06-18 NOTE — LETTER
6/18/2024      Mark Milton Traaseth  [address] [address]      Dear Colleague,    Thank you for referring your patient, Mark Milton Traaseth, to the [facility]. Please see a copy of my visit note below.    NEUROSURGERY CONSULTATION NOTE    Neurosurgery was asked to see this patient by No att. providers found for evaluation of left leg pain and weakness likely along left L4 dermatomal distribution    CONSULTATION ASSESSMENT AND PLAN:    Mr. Traaseth is a 59-year-old right-handed gentleman with significant past medical history of diabetes mellitus type 2 insulin-dependent last HbA1c of 7.5, hypertension, coronary artery disease s/p coronary stents x 2 on 5/11/2021 on 81 mg aspirin, obesity BMI of 37.67, JANAK, ADHD, depression, anxiety, spinal stenosis s/p lumbar 4 lumbar 5 decompression 20 years ago, initially presented to the [facility] from 5/25/2024 to 5/30/2024 with acute onset low back pain and left leg pain likely along left L4 dermatomal distribution.  He also underwent left L3-4 transforaminal epidural steroid injection on 5/28/2024 with minimal to no improvement.  He is in the clinic today with his spouse to discuss further management.  Since the discharge patient has developed weakness involving his left thigh to the point that he is ambulating with a walker.  He has difficulty lifting his left leg off the ground.  He has weakness involving left L3 and L4 dermatome.  He has impaired tandem walking and antalgic gait.    I discussed the MRI lumbar spine with the patient and his spouse and showed them the images.  I explained to them that he has degenerative disc disease with HNP at multiple levels primarily at lumbar 3 lumbar 4 causing lateral recess stenosis on the left which is likely contributing to his symptoms.  I discussed the natural history of degenerative disc disease and also discussed the management options including conservative treatment with  physical therapy, non surgical interventions, decompression alone and decompression with fusion.  Given new onset weakness and failure of conservative management I would recommend minimally invasive left lumbar 3 lumbar 4 lateral recess decompression.    I discussed the risk and benefits of the surgery including but not limited to bleeding, infection, injury to the thecal sac, CSF leak, injury to the nerve root, incomplete relief of symptoms, spine instability requiring further fusion surgery, MI/DVT/PE, need for prolonged rehabilitation and others.    I explained to the patient that we need clearance from his cardiologist and primary care physician for the surgery.  He will also need to stop aspirin 10 days prior to the surgery.  We will schedule the surgery and give him a call.  He can continue physical therapy in the interim.    Patient agreed with the plan.  All the questions were answered and patient sounded understanding.  He can contact us if there are any further questions or concerns or worsening neurological deficits.I spent more than 60 minutes in this apt, examining the pt, reviewing the scans, reviewing notes from chart, discussing treatment options with risks and benefits and coordinating care. This note was created in part by the use of Dragon voice recognition system. Inadvertent grammatical errors and typographical errors may have occurred due to inherent limitation of voice recognition software.  Reasonable attempts made to avoid errors, but this document may contain an error not identified before finalizing.  Please contact me for any clarification needed.     Dom Price MD      HPI:    Mr. Traaseth is a 59-year-old right-handed gentleman with significant past medical history of diabetes mellitus type 2 insulin-dependent last HbA1c of 7.5, hypertension, coronary artery disease s/p coronary stents x 2 on 5/11/2021 on 81 mg aspirin, obesity BMI of 37.67, JANAK, ADHD, depression, anxiety, spinal  stenosis s/p lumbar 4 lumbar 5 decompression 20 years ago, initially presented to the [facility] from 5/25/2024 to 5/30/2024 with acute onset low back pain and left leg pain likely along left L4 dermatomal distribution.  He also underwent left L3-4 transforaminal epidural steroid injection on 5/28/2024 with minimal to no improvement.  He is in the clinic today with his spouse to discuss further management.    Patient developed acute onset low back pain radiating to his left lower extremity without preceding trauma 2 days prior to the admission.  He went to the ER and was admitted to the hospital.  He reports that his pain was radiating from his low back to the left lateral hip to left anterolateral thigh to the knee and intermittently to the ankle and foot.  He also reports tingling and numbness along the same distribution.  Patient had no weakness during the admission.  He underwent transforaminal epidural steroid injection with no significant benefit.  Patient had no bladder or bowel symptoms during the admission.    Since the discharge patient had a fall at home since his knee was giving way.  He underwent x-ray knee on 6/17/2024 which was negative for any fractures.  Patient reports that his left leg is weak since the discharge.  He is not able to lift his leg properly and requires considerable support to ambulate.  He is ambulating with a walker at this point.  He reports that his pain has improved a bit since the discharge.  His pain level during clinic visit is 4 x 10, sharp in nature, aggravated with movement and relieved with opioid medications.  He denies any new onset bladder or bowel symptoms.    He has a long history of lumbar spinal stenosis and right lower extremity pain for which he underwent lumbar 4 lumbar 5 decompression 20 years ago.  He had a rough recovery after the surgery with incomplete resolution of his symptoms which took almost a year to resolve.    He does not smoke, denies use  of alcohol and tried THC Gummies recently for his pain.  He denies any other significant cardiac or pulm history.  He is on 81 mg aspirin not on anticoagulants.      No past medical history on file.    Past Surgical History:   Procedure Laterality Date     IR LUMBAR/SACRAL TRANSFOR INJ BILATERAL Bilateral 5/28/2024       REVIEW OF SYSTEMS:  Review Of Systems  Skin: negative  Eyes: negative  Ears/Nose/Throat: negative  Respiratory: No shortness of breath, dyspnea on exertion, cough, or hemoptysis  Cardiovascular: negative  Gastrointestinal: negative  Genitourinary: negative  Musculoskeletal: Left leg pain and weakness  Neurologic: negative  Psychiatric: negative  Hematologic/Lymphatic/Immunologic: negative  Endocrine: negative    MEDICATIONS:    Current Outpatient Medications   Medication Sig Dispense Refill     acetaminophen (TYLENOL) 500 MG tablet Take 2 tablets (1,000 mg) by mouth 3 times daily 100 tablet 0     allopurinol (ZYLOPRIM) 100 MG tablet Take 100 mg by mouth daily       amLODIPine (NORVASC) 10 MG tablet Take 10 mg by mouth daily       aspirin 81 MG EC tablet Take 81 mg by mouth daily       buPROPion (WELLBUTRIN XL) 300 MG 24 hr tablet Take 300 mg by mouth every morning       carvedilol (COREG) 25 MG tablet Take 1 tablet (25 mg) by mouth 2 times daily (with meals) 60 tablet 0     CHERRY CONCENTRATE PO Take 2 tablets by mouth 3 times daily       CHROMIUM PICOLINATE PO Take 1 tablet by mouth 2 times daily (before meals)       DANDELION PO Take 1 tablet by mouth daily       hydrALAZINE (APRESOLINE) 100 MG tablet Take 1 tablet (100 mg) by mouth every 8 hours 90 tablet 0     HYDROmorphone (DILAUDID) 2 MG tablet Take 1.5 tablets (3 mg) by mouth every 6 hours 42 tablet 0     hydrOXYzine HCl (ATARAX) 10 MG tablet Take 10-20 mg by mouth 2 times daily as needed for itching       insulin glargine (LANTUS PEN) 100 UNIT/ML pen Inject 15 Units Subcutaneous every morning 15 mL 0     insulin glargine (LANTUS PEN) 100  UNIT/ML pen Inject 100 Units Subcutaneous at bedtime       L-GLUTAMINE PO Take 5 g by mouth 2 times daily       Levomefolic Acid (5-MTHF) 1 MG CAPS Take 1-7 mg by mouth daily       levothyroxine (SYNTHROID/LEVOTHROID) 50 MCG tablet Take 50 mcg by mouth daily       losartan (COZAAR) 50 MG tablet Take 50 mg by mouth daily       MAGNESIUM GLYCINATE PO Take 2 capsules by mouth 2 times daily       methocarbamol (ROBAXIN) 500 MG tablet Take 1 tablet (500 mg) by mouth every 6 hours as needed for muscle spasms 60 tablet 0     nitroGLYcerin (NITROSTAT) 0.4 MG sublingual tablet Place 0.4 mg under the tongue every 5 minutes as needed for chest pain For chest pain place 1 tablet under the tongue every 5 minutes for 3 doses. If symptoms persist 5 minutes after 1st dose call 911.       omeprazole (PRILOSEC) 20 MG DR capsule Take 20 mg by mouth daily       pregabalin (LYRICA) 50 MG capsule Take 1 capsule (50 mg) by mouth 3 times daily 90 capsule 0     pyridOXINE (VITAMIN B-6) 50 MG tablet Take 50 mg by mouth daily       red yeast rice 600 MG CAPS Take 600 mg by mouth 2 times daily       sildenafil (REVATIO) 20 MG tablet Take 100 mg by mouth daily as needed       tamsulosin (FLOMAX) 0.4 MG capsule Take 1 capsule (0.4 mg) by mouth daily 30 capsule 0     tirzepatide (MOUNJARO) 10 MG/0.5ML pen Inject 10 mg Subcutaneous every 7 days       torsemide (DEMADEX) 20 MG tablet Take 40 mg by mouth daily       valACYclovir (VALTREX) 1000 mg tablet Take 1,000 mg by mouth 2 times daily as needed X 1 day for cold sores       vitamin B complex with vitamin C (VITAMIN  B COMPLEX) tablet Take 1 tablet by mouth daily       insulin lispro (HUMALOG KWIKPEN) 100 UNIT/ML (1 unit dial) KWIKPEN Inject 0-12 Units Subcutaneous 3 times daily (before meals) Hasn't been using much, but if BG>190 he takes 12 units (Patient not taking: Reported on 6/18/2024)           ALLERGIES/SENSITIVITIES:     Allergies   Allergen Reactions     Codeine Itching      Hydromorphone Itching     Can take with benadryl     Morphine Itching     Can take with benadryl       PERTINENT SOCIAL HISTORY: Non-smoker  Social History     Socioeconomic History     Marital status:      Spouse name: None     Number of children: None     Years of education: None     Highest education level: None   Tobacco Use     Smoking status: Never     Smokeless tobacco: Never     Social Determinants of Health     Financial Resource Strain: Low Risk  (6/7/2019)    Received from [facility]    Overall Financial Resource Strain (CARDIA)      Difficulty of Paying Living Expenses: Not very hard   Food Insecurity: No Food Insecurity (6/7/2019)    Received from [facility]    Hunger Vital Sign      Worried About Running Out of Food in the Last Year: Never true      Ran Out of Food in the Last Year: Never true   Transportation Needs: No Transportation Needs (6/7/2019)    Received from [facility]    PRAPARE - Transportation      Lack of Transportation (Medical): No      Lack of Transportation (Non-Medical): No   Physical Activity: Inactive (6/7/2019)    Received from [facility]    Exercise Vital Sign      Days of Exercise per Week: 0 days      Minutes of Exercise per Session: 0 min   Stress: Stress Concern Present (7/22/2019)    Received from [facility]    [employer] - Occupational Stress Questionnaire      Feeling of Stress : To some extent   Social Connections: Socially Integrated (7/22/2019)    Received from [facility]    Social Connection and Isolation Panel [NHANES]      Frequency of Communication with Friends and Family: Twice a week      Frequency of Social Gatherings with Friends and Family: Once a week      Attends Jehovah's witness Services: More than 4 times per year      Active Member of Clubs or Organizations: Yes      Attends Club or Organization Meetings: More than 4 times per year      Marital Status:          FAMILY HISTORY:  No family history on file.  "    PHYSICAL EXAM:   Constitutional: BP (!) 140/71   Pulse 63   Ht 6' 2\" (1.88 m)   Wt 293 lb 6.4 oz (133.1 kg)   SpO2 97%   BMI 37.67 kg/m       Mental Status: A & O in no acute distress.  Affect is appropriate.  Speech is fluent.  Recent and remote memory are intact.  Attention span and concentration are normal.        Cranial Nerves:   CN1: grossly intact per patient recall.   CN2: No funduscopic exam performed.   CN3,4 & 6: Pupillary light response, lateral and vertical gaze normal.  No nystagmus.  Visual fields are full to confrontation.   CN5: Intact to touch   CN7: No facial weakness, smile, facial symmetry intact.   CN8: Intact to spoken voice.   CN9&10: Gag reflex, uvula midline, palate rises with phonation.   CN11: Shoulder shrug 5/5 intact bilaterally.   CN12: Tongue midline and moves freely from side to side.     Motor: No pronator drift of upper extremity.   Normal bulk and tone all muscle groups of upper and lower extremities.       Delt Bi Tri Hand Flex/  Ext Iliopsoas Quadriceps Tibialis Anterior EHL Gastroc     C5 C6 C7 C8/T1 L2 L3 L4 L5 S1   R 5 5 5 5 5 5 5 - 5   L 5 5 5 5 5 -4 -4 - 5   Patient has missing toes on and missing pinky toe on the left secondary to uncontrolled diabetes right side.       Sensory: Sensation intact bilaterally to light touch.     Coordination; finger to nose, rapid alternating movements smooth and rhythmic.   Romberg impaired  Heel/toe/tandem gait impaired  Patient ambulating with a walker     Reflexes;                       Right              Left  Brachioradialis (C5,6)      2+                 2+  Biceps   (C5,6)                 2+                 2+  Triceps  (C7,8)                 2+                2+  Knee (L3,4)                      2+                2+  Ankle jerk (S1,2)              1+                1+      No hoffmans/babinski/ clonus.  FABERS/Berhane test bilaterally positive left more than the right  SLR restricted on the left  No lower lumbar paraspinal " tenderness.       IMAGING:  I personally reviewed all radiographic images and agree with the radiology report of left L3-4 lateral recess stenosis with HNP.     5/25/2024 MRI lumbar spine:  IMPRESSION:  1.  Unremarkable cord and cauda equina nerve roots.  2.  No high-grade spinal canal stenosis.  3.  At L3-L4, there is a left-sided extrusion which contributes to left lateral recess stenosis and impingement upon the descending left L4 nerve.  4.  At L4-L5, there is a central disc herniation without high-grade stenosis of the spinal canal or neural foramina. Prior right hemilaminotomy.  5.  At L5-S1, there is moderate bilateral neural foraminal stenosis.       Cc:   True Nogueira                Again, thank you for allowing me to participate in the care of your patient.        Sincerely,        Dom Price MD

## 2024-06-18 NOTE — NURSING NOTE
"Mark Milton Traaseth is a 59 year old male who presents for:  Chief Complaint   Patient presents with    Consult     No relief from injection. Acute LBP with L3-4 radiculopathy.         Initial Vitals:  BP (!) 140/71   Pulse 63   Ht 6' 2\" (1.88 m)   Wt 293 lb 6.4 oz (133.1 kg)   SpO2 97%   BMI 37.67 kg/m   Estimated body mass index is 37.67 kg/m  as calculated from the following:    Height as of this encounter: 6' 2\" (1.88 m).    Weight as of this encounter: 293 lb 6.4 oz (133.1 kg).. Body surface area is 2.64 meters squared. BP completed using cuff size: large  Severe Pain (7)    Nursing Comments:       Roxann Pham    "

## 2024-06-18 NOTE — NURSING NOTE
Reviewed pre- and post-operative instructions as outlined in the After Visit Summary/Patient Instructions with patient.   Surgery folder was given to patient    Patient Education Topic: Procedure with Dr. Price     Learner(s): Patient and Significant other/Spouse     Knowledge Level: Basic    Readiness to Learn: Ready    Method:  Verbal explanation    Outcome: Able to verbalize instructions    Barriers to Learning: No barrier    NDI/MEL: Confirmation of completion within last 6 months    Smoking Status: Never    Pain Clinic: N/A    STD/FMLA: No  Job Description: Desk/Clerical Job - sales  Time Off: 2 weeks or 4 weeks     Patient had the opportunity for questions to be answered. Advised Patient and Significant other/Spouse  to call clinic with any questions/concerns. Gave patient antibacterial soap for pre-surgery skin preparation.

## 2024-06-19 ENCOUNTER — TELEPHONE (OUTPATIENT)
Dept: NEUROSURGERY | Facility: CLINIC | Age: 59
End: 2024-06-19
Payer: COMMERCIAL

## 2024-06-19 NOTE — TELEPHONE ENCOUNTER
Called and spoke to patient and he asked that I talk to his wife to schedule. Kiara stated that 6/26 would not work as patient needed to be off his aspirin for 10 days . 6/28 would be the 10th day. Next available would be 7/10 which she stated they would take . Advised her I would get scheduled and give her a call back with surgery details.

## 2024-07-03 RX ORDER — CYCLOBENZAPRINE HCL 10 MG
10 TABLET ORAL 3 TIMES DAILY PRN
Status: ON HOLD | COMMUNITY
End: 2024-07-11

## 2024-07-03 RX ORDER — CLONIDINE 0.1 MG/24H
1 PATCH, EXTENDED RELEASE TRANSDERMAL WEEKLY
COMMUNITY

## 2024-07-10 ENCOUNTER — ANESTHESIA EVENT (OUTPATIENT)
Dept: SURGERY | Facility: CLINIC | Age: 59
End: 2024-07-10
Payer: COMMERCIAL

## 2024-07-10 ENCOUNTER — HOSPITAL ENCOUNTER (OUTPATIENT)
Facility: CLINIC | Age: 59
Discharge: HOME | End: 2024-07-11
Attending: NEUROLOGICAL SURGERY | Admitting: NEUROLOGICAL SURGERY
Payer: COMMERCIAL

## 2024-07-10 ENCOUNTER — ANESTHESIA (OUTPATIENT)
Dept: SURGERY | Facility: CLINIC | Age: 59
End: 2024-07-10
Payer: COMMERCIAL

## 2024-07-10 ENCOUNTER — APPOINTMENT (OUTPATIENT)
Dept: GENERAL RADIOLOGY | Facility: CLINIC | Age: 59
End: 2024-07-10
Attending: NEUROLOGICAL SURGERY
Payer: COMMERCIAL

## 2024-07-10 DIAGNOSIS — Z98.890 HISTORY OF SPINAL SURGERY: Primary | ICD-10-CM

## 2024-07-10 DIAGNOSIS — E11.69 TYPE 2 DIABETES MELLITUS WITH OTHER SPECIFIED COMPLICATION, WITHOUT LONG-TERM CURRENT USE OF INSULIN (H): ICD-10-CM

## 2024-07-10 LAB
GLUCOSE BLDC GLUCOMTR-MCNC: 126 MG/DL (ref 70–99)
GLUCOSE BLDC GLUCOMTR-MCNC: 153 MG/DL (ref 70–99)
GLUCOSE BLDC GLUCOMTR-MCNC: 156 MG/DL (ref 70–99)
GLUCOSE BLDC GLUCOMTR-MCNC: 157 MG/DL (ref 70–99)

## 2024-07-10 PROCEDURE — 250N000013 HC RX MED GY IP 250 OP 250 PS 637

## 2024-07-10 PROCEDURE — 63030 LAMOT DCMPRN NRV RT 1 LMBR: CPT | Performed by: NURSE ANESTHETIST, CERTIFIED REGISTERED

## 2024-07-10 PROCEDURE — 250N000011 HC RX IP 250 OP 636: Performed by: ANESTHESIOLOGY

## 2024-07-10 PROCEDURE — 272N000001 HC OR GENERAL SUPPLY STERILE: Performed by: NEUROLOGICAL SURGERY

## 2024-07-10 PROCEDURE — 999N000141 HC STATISTIC PRE-PROCEDURE NURSING ASSESSMENT: Performed by: NEUROLOGICAL SURGERY

## 2024-07-10 PROCEDURE — 250N000011 HC RX IP 250 OP 636: Performed by: NURSE ANESTHETIST, CERTIFIED REGISTERED

## 2024-07-10 PROCEDURE — 250N000009 HC RX 250: Performed by: NEUROLOGICAL SURGERY

## 2024-07-10 PROCEDURE — 250N000011 HC RX IP 250 OP 636

## 2024-07-10 PROCEDURE — 370N000017 HC ANESTHESIA TECHNICAL FEE, PER MIN: Performed by: NEUROLOGICAL SURGERY

## 2024-07-10 PROCEDURE — 250N000005 HC OR RX SURGIFLO HEMOSTATIC MATRIX 10ML 199102S OPNP: Performed by: NEUROLOGICAL SURGERY

## 2024-07-10 PROCEDURE — 82962 GLUCOSE BLOOD TEST: CPT

## 2024-07-10 PROCEDURE — 258N000003 HC RX IP 258 OP 636: Performed by: NURSE ANESTHETIST, CERTIFIED REGISTERED

## 2024-07-10 PROCEDURE — 250N000011 HC RX IP 250 OP 636: Performed by: NEUROLOGICAL SURGERY

## 2024-07-10 PROCEDURE — 710N000009 HC RECOVERY PHASE 1, LEVEL 1, PER MIN: Performed by: NEUROLOGICAL SURGERY

## 2024-07-10 PROCEDURE — 250N000009 HC RX 250: Performed by: NURSE ANESTHETIST, CERTIFIED REGISTERED

## 2024-07-10 PROCEDURE — 250N000012 HC RX MED GY IP 250 OP 636 PS 637

## 2024-07-10 PROCEDURE — 360N000084 HC SURGERY LEVEL 4 W/ FLUORO, PER MIN: Performed by: NEUROLOGICAL SURGERY

## 2024-07-10 PROCEDURE — 250N000025 HC SEVOFLURANE, PER MIN: Performed by: NEUROLOGICAL SURGERY

## 2024-07-10 PROCEDURE — 250N000013 HC RX MED GY IP 250 OP 250 PS 637: Performed by: NEUROLOGICAL SURGERY

## 2024-07-10 PROCEDURE — 250N000011 HC RX IP 250 OP 636: Performed by: PHYSICIAN ASSISTANT

## 2024-07-10 PROCEDURE — 999N000179 XR SURGERY CARM FLUORO LESS THAN 5 MIN W STILLS

## 2024-07-10 PROCEDURE — 63030 LAMOT DCMPRN NRV RT 1 LMBR: CPT | Performed by: ANESTHESIOLOGY

## 2024-07-10 RX ORDER — FENTANYL CITRATE 50 UG/ML
50 INJECTION, SOLUTION INTRAMUSCULAR; INTRAVENOUS EVERY 5 MIN PRN
Status: DISCONTINUED | OUTPATIENT
Start: 2024-07-10 | End: 2024-07-10 | Stop reason: HOSPADM

## 2024-07-10 RX ORDER — ACETAMINOPHEN 325 MG/1
650 TABLET ORAL EVERY 4 HOURS PRN
Status: DISCONTINUED | OUTPATIENT
Start: 2024-07-13 | End: 2024-07-11 | Stop reason: HOSPADM

## 2024-07-10 RX ORDER — PROPOFOL 10 MG/ML
INJECTION, EMULSION INTRAVENOUS CONTINUOUS PRN
Status: DISCONTINUED | OUTPATIENT
Start: 2024-07-10 | End: 2024-07-10

## 2024-07-10 RX ORDER — BUPIVACAINE HYDROCHLORIDE AND EPINEPHRINE 5; 5 MG/ML; UG/ML
INJECTION, SOLUTION EPIDURAL; INTRACAUDAL; PERINEURAL PRN
Status: DISCONTINUED | OUTPATIENT
Start: 2024-07-10 | End: 2024-07-10 | Stop reason: HOSPADM

## 2024-07-10 RX ORDER — NALOXONE HYDROCHLORIDE 0.4 MG/ML
0.2 INJECTION, SOLUTION INTRAMUSCULAR; INTRAVENOUS; SUBCUTANEOUS
Status: DISCONTINUED | OUTPATIENT
Start: 2024-07-10 | End: 2024-07-11 | Stop reason: HOSPADM

## 2024-07-10 RX ORDER — HYDROXYZINE HYDROCHLORIDE 10 MG/1
10-20 TABLET, FILM COATED ORAL 2 TIMES DAILY PRN
Status: DISCONTINUED | OUTPATIENT
Start: 2024-07-10 | End: 2024-07-11 | Stop reason: HOSPADM

## 2024-07-10 RX ORDER — ONDANSETRON 2 MG/ML
4 INJECTION INTRAMUSCULAR; INTRAVENOUS EVERY 30 MIN PRN
Status: DISCONTINUED | OUTPATIENT
Start: 2024-07-10 | End: 2024-07-10 | Stop reason: HOSPADM

## 2024-07-10 RX ORDER — FENTANYL CITRATE 50 UG/ML
INJECTION, SOLUTION INTRAMUSCULAR; INTRAVENOUS PRN
Status: DISCONTINUED | OUTPATIENT
Start: 2024-07-10 | End: 2024-07-10

## 2024-07-10 RX ORDER — DEXMEDETOMIDINE HYDROCHLORIDE 4 UG/ML
INJECTION, SOLUTION INTRAVENOUS PRN
Status: DISCONTINUED | OUTPATIENT
Start: 2024-07-10 | End: 2024-07-10

## 2024-07-10 RX ORDER — FENTANYL CITRATE 50 UG/ML
25 INJECTION, SOLUTION INTRAMUSCULAR; INTRAVENOUS EVERY 5 MIN PRN
Status: DISCONTINUED | OUTPATIENT
Start: 2024-07-10 | End: 2024-07-10 | Stop reason: HOSPADM

## 2024-07-10 RX ORDER — BISACODYL 10 MG
10 SUPPOSITORY, RECTAL RECTAL DAILY PRN
Status: DISCONTINUED | OUTPATIENT
Start: 2024-07-13 | End: 2024-07-11 | Stop reason: HOSPADM

## 2024-07-10 RX ORDER — KETAMINE HYDROCHLORIDE 10 MG/ML
INJECTION INTRAMUSCULAR; INTRAVENOUS PRN
Status: DISCONTINUED | OUTPATIENT
Start: 2024-07-10 | End: 2024-07-10

## 2024-07-10 RX ORDER — PANTOPRAZOLE SODIUM 40 MG/1
40 TABLET, DELAYED RELEASE ORAL DAILY
Status: DISCONTINUED | OUTPATIENT
Start: 2024-07-11 | End: 2024-07-11 | Stop reason: HOSPADM

## 2024-07-10 RX ORDER — TORSEMIDE 20 MG/1
40 TABLET ORAL DAILY
Status: DISCONTINUED | OUTPATIENT
Start: 2024-07-11 | End: 2024-07-11 | Stop reason: HOSPADM

## 2024-07-10 RX ORDER — PREGABALIN 50 MG/1
50 CAPSULE ORAL 3 TIMES DAILY
Status: DISCONTINUED | OUTPATIENT
Start: 2024-07-10 | End: 2024-07-11 | Stop reason: HOSPADM

## 2024-07-10 RX ORDER — LIDOCAINE HYDROCHLORIDE 20 MG/ML
INJECTION, SOLUTION INFILTRATION; PERINEURAL PRN
Status: DISCONTINUED | OUTPATIENT
Start: 2024-07-10 | End: 2024-07-10

## 2024-07-10 RX ORDER — NALOXONE HYDROCHLORIDE 0.4 MG/ML
0.4 INJECTION, SOLUTION INTRAMUSCULAR; INTRAVENOUS; SUBCUTANEOUS
Status: DISCONTINUED | OUTPATIENT
Start: 2024-07-10 | End: 2024-07-11 | Stop reason: HOSPADM

## 2024-07-10 RX ORDER — NALOXONE HYDROCHLORIDE 0.4 MG/ML
0.1 INJECTION, SOLUTION INTRAMUSCULAR; INTRAVENOUS; SUBCUTANEOUS
Status: DISCONTINUED | OUTPATIENT
Start: 2024-07-10 | End: 2024-07-10 | Stop reason: HOSPADM

## 2024-07-10 RX ORDER — HYDRALAZINE HYDROCHLORIDE 20 MG/ML
10 INJECTION INTRAMUSCULAR; INTRAVENOUS ONCE
Status: COMPLETED | OUTPATIENT
Start: 2024-07-10 | End: 2024-07-10

## 2024-07-10 RX ORDER — DIPHENHYDRAMINE HCL 25 MG
25 CAPSULE ORAL EVERY 6 HOURS PRN
Status: DISCONTINUED | OUTPATIENT
Start: 2024-07-10 | End: 2024-07-11 | Stop reason: HOSPADM

## 2024-07-10 RX ORDER — CLONIDINE 0.1 MG/24H
1 PATCH, EXTENDED RELEASE TRANSDERMAL WEEKLY
Status: DISCONTINUED | OUTPATIENT
Start: 2024-07-10 | End: 2024-07-11 | Stop reason: HOSPADM

## 2024-07-10 RX ORDER — LIDOCAINE 40 MG/G
CREAM TOPICAL
Status: DISCONTINUED | OUTPATIENT
Start: 2024-07-10 | End: 2024-07-11 | Stop reason: HOSPADM

## 2024-07-10 RX ORDER — HYDRALAZINE HYDROCHLORIDE 50 MG/1
100 TABLET, FILM COATED ORAL EVERY 8 HOURS
Status: DISCONTINUED | OUTPATIENT
Start: 2024-07-10 | End: 2024-07-11 | Stop reason: HOSPADM

## 2024-07-10 RX ORDER — AMLODIPINE BESYLATE 10 MG/1
10 TABLET ORAL DAILY
Status: DISCONTINUED | OUTPATIENT
Start: 2024-07-11 | End: 2024-07-11 | Stop reason: HOSPADM

## 2024-07-10 RX ORDER — GABAPENTIN 300 MG/1
300 CAPSULE ORAL
Status: COMPLETED | OUTPATIENT
Start: 2024-07-10 | End: 2024-07-10

## 2024-07-10 RX ORDER — ACETAMINOPHEN 325 MG/1
975 TABLET ORAL EVERY 8 HOURS
Status: DISCONTINUED | OUTPATIENT
Start: 2024-07-10 | End: 2024-07-11 | Stop reason: HOSPADM

## 2024-07-10 RX ORDER — NICOTINE POLACRILEX 4 MG
15-30 LOZENGE BUCCAL
Status: DISCONTINUED | OUTPATIENT
Start: 2024-07-10 | End: 2024-07-11 | Stop reason: HOSPADM

## 2024-07-10 RX ORDER — CARVEDILOL 25 MG/1
25 TABLET ORAL 2 TIMES DAILY WITH MEALS
Status: DISCONTINUED | OUTPATIENT
Start: 2024-07-10 | End: 2024-07-11 | Stop reason: HOSPADM

## 2024-07-10 RX ORDER — HYDRALAZINE HYDROCHLORIDE 20 MG/ML
2.5-5 INJECTION INTRAMUSCULAR; INTRAVENOUS EVERY 10 MIN PRN
Status: DISCONTINUED | OUTPATIENT
Start: 2024-07-10 | End: 2024-07-10 | Stop reason: HOSPADM

## 2024-07-10 RX ORDER — POLYETHYLENE GLYCOL 3350 17 G/17G
17 POWDER, FOR SOLUTION ORAL DAILY
Status: DISCONTINUED | OUTPATIENT
Start: 2024-07-11 | End: 2024-07-11 | Stop reason: HOSPADM

## 2024-07-10 RX ORDER — SODIUM CHLORIDE, SODIUM LACTATE, POTASSIUM CHLORIDE, CALCIUM CHLORIDE 600; 310; 30; 20 MG/100ML; MG/100ML; MG/100ML; MG/100ML
INJECTION, SOLUTION INTRAVENOUS CONTINUOUS
Status: DISCONTINUED | OUTPATIENT
Start: 2024-07-10 | End: 2024-07-10 | Stop reason: HOSPADM

## 2024-07-10 RX ORDER — HYDROMORPHONE HCL IN WATER/PF 6 MG/30 ML
0.2 PATIENT CONTROLLED ANALGESIA SYRINGE INTRAVENOUS
Status: DISCONTINUED | OUTPATIENT
Start: 2024-07-10 | End: 2024-07-11 | Stop reason: HOSPADM

## 2024-07-10 RX ORDER — BUPROPION HYDROCHLORIDE 300 MG/1
300 TABLET ORAL EVERY MORNING
Status: DISCONTINUED | OUTPATIENT
Start: 2024-07-11 | End: 2024-07-11 | Stop reason: HOSPADM

## 2024-07-10 RX ORDER — HYDROMORPHONE HCL IN WATER/PF 6 MG/30 ML
0.4 PATIENT CONTROLLED ANALGESIA SYRINGE INTRAVENOUS
Status: DISCONTINUED | OUTPATIENT
Start: 2024-07-10 | End: 2024-07-11 | Stop reason: HOSPADM

## 2024-07-10 RX ORDER — DEXAMETHASONE SODIUM PHOSPHATE 4 MG/ML
4 INJECTION, SOLUTION INTRA-ARTICULAR; INTRALESIONAL; INTRAMUSCULAR; INTRAVENOUS; SOFT TISSUE
Status: DISCONTINUED | OUTPATIENT
Start: 2024-07-10 | End: 2024-07-10 | Stop reason: HOSPADM

## 2024-07-10 RX ORDER — TORSEMIDE 20 MG/1
40 TABLET ORAL DAILY
Status: DISCONTINUED | OUTPATIENT
Start: 2024-07-11 | End: 2024-07-10

## 2024-07-10 RX ORDER — ONDANSETRON 4 MG/1
4 TABLET, ORALLY DISINTEGRATING ORAL EVERY 30 MIN PRN
Status: DISCONTINUED | OUTPATIENT
Start: 2024-07-10 | End: 2024-07-10 | Stop reason: HOSPADM

## 2024-07-10 RX ORDER — DEXTROSE MONOHYDRATE 25 G/50ML
25-50 INJECTION, SOLUTION INTRAVENOUS
Status: DISCONTINUED | OUTPATIENT
Start: 2024-07-10 | End: 2024-07-11 | Stop reason: HOSPADM

## 2024-07-10 RX ORDER — PROPOFOL 10 MG/ML
INJECTION, EMULSION INTRAVENOUS PRN
Status: DISCONTINUED | OUTPATIENT
Start: 2024-07-10 | End: 2024-07-10

## 2024-07-10 RX ORDER — CEFAZOLIN SODIUM/WATER 3 G/30 ML
3 SYRINGE (ML) INTRAVENOUS
Status: COMPLETED | OUTPATIENT
Start: 2024-07-10 | End: 2024-07-10

## 2024-07-10 RX ORDER — DIPHENHYDRAMINE HYDROCHLORIDE 50 MG/ML
25 INJECTION INTRAMUSCULAR; INTRAVENOUS EVERY 6 HOURS PRN
Status: DISCONTINUED | OUTPATIENT
Start: 2024-07-10 | End: 2024-07-11 | Stop reason: HOSPADM

## 2024-07-10 RX ORDER — LEVOTHYROXINE SODIUM 50 UG/1
50 TABLET ORAL DAILY
Status: DISCONTINUED | OUTPATIENT
Start: 2024-07-11 | End: 2024-07-11 | Stop reason: HOSPADM

## 2024-07-10 RX ORDER — OXYCODONE HYDROCHLORIDE 5 MG/1
10 TABLET ORAL EVERY 4 HOURS PRN
Status: DISCONTINUED | OUTPATIENT
Start: 2024-07-10 | End: 2024-07-11 | Stop reason: HOSPADM

## 2024-07-10 RX ORDER — OXYCODONE HYDROCHLORIDE 5 MG/1
5 TABLET ORAL EVERY 4 HOURS PRN
Status: DISCONTINUED | OUTPATIENT
Start: 2024-07-10 | End: 2024-07-11 | Stop reason: HOSPADM

## 2024-07-10 RX ORDER — AMOXICILLIN 250 MG
1 CAPSULE ORAL 2 TIMES DAILY
Status: DISCONTINUED | OUTPATIENT
Start: 2024-07-10 | End: 2024-07-11 | Stop reason: HOSPADM

## 2024-07-10 RX ORDER — LOSARTAN POTASSIUM 50 MG/1
50 TABLET ORAL DAILY
Status: DISCONTINUED | OUTPATIENT
Start: 2024-07-10 | End: 2024-07-11 | Stop reason: HOSPADM

## 2024-07-10 RX ORDER — ALLOPURINOL 100 MG/1
100 TABLET ORAL DAILY PRN
Status: DISCONTINUED | OUTPATIENT
Start: 2024-07-10 | End: 2024-07-11 | Stop reason: HOSPADM

## 2024-07-10 RX ORDER — ONDANSETRON 2 MG/ML
INJECTION INTRAMUSCULAR; INTRAVENOUS PRN
Status: DISCONTINUED | OUTPATIENT
Start: 2024-07-10 | End: 2024-07-10

## 2024-07-10 RX ORDER — CEFAZOLIN SODIUM/WATER 3 G/30 ML
3 SYRINGE (ML) INTRAVENOUS SEE ADMIN INSTRUCTIONS
Status: DISCONTINUED | OUTPATIENT
Start: 2024-07-10 | End: 2024-07-10 | Stop reason: HOSPADM

## 2024-07-10 RX ORDER — SODIUM CHLORIDE, SODIUM LACTATE, POTASSIUM CHLORIDE, CALCIUM CHLORIDE 600; 310; 30; 20 MG/100ML; MG/100ML; MG/100ML; MG/100ML
INJECTION, SOLUTION INTRAVENOUS CONTINUOUS PRN
Status: DISCONTINUED | OUTPATIENT
Start: 2024-07-10 | End: 2024-07-10

## 2024-07-10 RX ADMIN — FENTANYL CITRATE 25 MCG: 50 INJECTION, SOLUTION INTRAMUSCULAR; INTRAVENOUS at 13:43

## 2024-07-10 RX ADMIN — ACETAMINOPHEN 975 MG: 325 TABLET, FILM COATED ORAL at 14:41

## 2024-07-10 RX ADMIN — HYDROMORPHONE HYDROCHLORIDE 0.4 MG: 0.2 INJECTION, SOLUTION INTRAMUSCULAR; INTRAVENOUS; SUBCUTANEOUS at 17:56

## 2024-07-10 RX ADMIN — SUGAMMADEX 400 MG: 100 INJECTION, SOLUTION INTRAVENOUS at 12:46

## 2024-07-10 RX ADMIN — FENTANYL CITRATE 25 MCG: 50 INJECTION, SOLUTION INTRAMUSCULAR; INTRAVENOUS at 14:41

## 2024-07-10 RX ADMIN — ACETAMINOPHEN 975 MG: 325 TABLET, FILM COATED ORAL at 21:46

## 2024-07-10 RX ADMIN — INSULIN ASPART 1 UNITS: 100 INJECTION, SOLUTION INTRAVENOUS; SUBCUTANEOUS at 18:01

## 2024-07-10 RX ADMIN — ROCURONIUM BROMIDE 20 MG: 50 INJECTION, SOLUTION INTRAVENOUS at 11:45

## 2024-07-10 RX ADMIN — ROCURONIUM BROMIDE 70 MG: 50 INJECTION, SOLUTION INTRAVENOUS at 10:54

## 2024-07-10 RX ADMIN — Medication 20 MG: at 11:10

## 2024-07-10 RX ADMIN — PHENYLEPHRINE HYDROCHLORIDE 100 MCG: 10 INJECTION INTRAVENOUS at 12:19

## 2024-07-10 RX ADMIN — SODIUM CHLORIDE, POTASSIUM CHLORIDE, SODIUM LACTATE AND CALCIUM CHLORIDE: 600; 310; 30; 20 INJECTION, SOLUTION INTRAVENOUS at 09:28

## 2024-07-10 RX ADMIN — Medication 20 MG: at 12:10

## 2024-07-10 RX ADMIN — SODIUM CHLORIDE, POTASSIUM CHLORIDE, SODIUM LACTATE AND CALCIUM CHLORIDE: 600; 310; 30; 20 INJECTION, SOLUTION INTRAVENOUS at 12:24

## 2024-07-10 RX ADMIN — LOSARTAN POTASSIUM 50 MG: 50 TABLET, FILM COATED ORAL at 21:46

## 2024-07-10 RX ADMIN — MIDAZOLAM 2 MG: 1 INJECTION INTRAMUSCULAR; INTRAVENOUS at 10:49

## 2024-07-10 RX ADMIN — PREGABALIN 50 MG: 50 CAPSULE ORAL at 21:46

## 2024-07-10 RX ADMIN — PROPOFOL 300 MG: 10 INJECTION, EMULSION INTRAVENOUS at 10:53

## 2024-07-10 RX ADMIN — PHENYLEPHRINE HYDROCHLORIDE 200 MCG: 10 INJECTION INTRAVENOUS at 11:08

## 2024-07-10 RX ADMIN — Medication 3 G: at 10:50

## 2024-07-10 RX ADMIN — LIDOCAINE HYDROCHLORIDE 100 MG: 20 INJECTION, SOLUTION INFILTRATION; PERINEURAL at 10:53

## 2024-07-10 RX ADMIN — CLONIDINE 1 PATCH: 0.1 PATCH TRANSDERMAL at 18:06

## 2024-07-10 RX ADMIN — HYDROMORPHONE HYDROCHLORIDE 0.4 MG: 0.2 INJECTION, SOLUTION INTRAMUSCULAR; INTRAVENOUS; SUBCUTANEOUS at 23:36

## 2024-07-10 RX ADMIN — GABAPENTIN 300 MG: 300 CAPSULE ORAL at 09:05

## 2024-07-10 RX ADMIN — ONDANSETRON 4 MG: 2 INJECTION INTRAMUSCULAR; INTRAVENOUS at 12:40

## 2024-07-10 RX ADMIN — DIPHENHYDRAMINE HYDROCHLORIDE 25 MG: 50 INJECTION, SOLUTION INTRAMUSCULAR; INTRAVENOUS at 17:53

## 2024-07-10 RX ADMIN — PROPOFOL 25 MCG/KG/MIN: 10 INJECTION, EMULSION INTRAVENOUS at 11:03

## 2024-07-10 RX ADMIN — FENTANYL CITRATE 25 MCG: 50 INJECTION, SOLUTION INTRAMUSCULAR; INTRAVENOUS at 13:34

## 2024-07-10 RX ADMIN — FENTANYL CITRATE 100 MCG: 50 INJECTION INTRAMUSCULAR; INTRAVENOUS at 10:53

## 2024-07-10 RX ADMIN — DEXMEDETOMIDINE HYDROCHLORIDE 12 MCG: 200 INJECTION INTRAVENOUS at 12:35

## 2024-07-10 RX ADMIN — SENNOSIDES AND DOCUSATE SODIUM 1 TABLET: 50; 8.6 TABLET ORAL at 19:27

## 2024-07-10 RX ADMIN — HYDRALAZINE HYDROCHLORIDE 10 MG: 20 INJECTION INTRAMUSCULAR; INTRAVENOUS at 14:31

## 2024-07-10 RX ADMIN — DIPHENHYDRAMINE HYDROCHLORIDE 25 MG: 50 INJECTION, SOLUTION INTRAMUSCULAR; INTRAVENOUS at 23:37

## 2024-07-10 RX ADMIN — CARVEDILOL 25 MG: 25 TABLET, FILM COATED ORAL at 19:27

## 2024-07-10 RX ADMIN — HYDRALAZINE HYDROCHLORIDE 10 MG: 20 INJECTION INTRAMUSCULAR; INTRAVENOUS at 14:01

## 2024-07-10 RX ADMIN — HYDRALAZINE HYDROCHLORIDE 100 MG: 50 TABLET ORAL at 21:46

## 2024-07-10 ASSESSMENT — ACTIVITIES OF DAILY LIVING (ADL)
ADLS_ACUITY_SCORE: 38

## 2024-07-10 ASSESSMENT — LIFESTYLE VARIABLES: TOBACCO_USE: 1

## 2024-07-10 NOTE — ANESTHESIA PREPROCEDURE EVALUATION
Anesthesia Pre-Procedure Evaluation    Patient: Mark Milton Traaseth   MRN: [id number] : 1965        Procedure : Procedure(s):  LEFT LUMBAR 3, LUMBAR 4 LATERAL RECESS DECOMPRESSION AND MICRODISCECTOMY          Past Medical History:   Diagnosis Date     Alcohol-induced acute pancreatitis without infection or necrosis      ASCVD (arteriosclerotic cardiovascular disease)      Attention deficit hyperactivity disorder (ADHD), predominantly inattentive type, in remission      Cellulitis of right lower extremity      Decreased sex drive      Depression with anxiety      Diabetic macular edema of both eyes (H)      Essential hypertension      Gout      Hepatic steatosis      History of basal cell carcinoma of skin: face      Irritable bowel syndrome without diarrhea      Mixed hyperlipidemia      JANAK (obstructive sleep apnea)      Personal history of malignant melanoma of skin      Stented coronary artery      Type 2 diabetes mellitus with diabetic leg ulcer (H)      Unilateral primary osteoarthritis, right hip       Past Surgical History:   Procedure Laterality Date     5TH RAY RESECTION, APPLICATION OF ANTIBIOTIC Left 2020     APPENDECTOMY  2004     ARTHROSCOPIC SHOULDER DECOMPRESSION Right 2010     BACK SURGERY       CHONDRECTOMY OF SPINE: DISCECTOMY  09/15/2009     EYE LASER TREATMENT  2014     IR LUMBAR/SACRAL TRANSFOR INJ BILATERAL Bilateral 2024     IRRIGATION AND DEBRIDEMENT LOWER EXTREMITY WITH EXCISION OSTEOMYELITIC BONE FOR BIOPSY WITH CULTURES  Right 2021     LAMINOTOMY: W/DCMPRSN NRV ROOT 1 INTRSPC LUMBR  09/15/2009     LAPAROSCOPIC CHOLECYSTECTOMY       METATARSAL RAY 2ND TOE AMPUTATION Right 2019     PHOTOCOAGULATION OF THE EYE Left     3/8/2011, 2011, 10/18/2011     PHOTOCOAGULATION OF THE EYE Left 2012     PHOTOCOAGULATION OF THE EYE Left 2013     PHOTOCOAGULATION OF THE EYES Bilateral 2012     SESAMOIDECTOMY: GREAT TOE DISTAL PHALANX  SESAMOID EXCISION & TOE HALLUX AMPUTATION Right 06/21/2018     TOE AMPUTATION: GREAT TOE Right 08/09/2018     TRANSMETATARSAL AMPUTATION OF FOOT Right 09/08/2021     VASECTOMY        Allergies   Allergen Reactions     Vancomycin Itching     riddle     Codeine Itching     Can take with benadryl     Hydromorphone Itching     Can take with benadryl     Morphine Itching     Can take with benadryl      Social History     Tobacco Use     Smoking status: Never     Smokeless tobacco: Former     Types: Chew   Substance Use Topics     Alcohol use: Yes     Comment: 7 cans of beer per week      Wt Readings from Last 1 Encounters:   06/18/24 133.1 kg (293 lb 6.4 oz)        Anesthesia Evaluation   Pt has had prior anesthetic.     History of anesthetic complications  - .  agitated.    ROS/MED HX  ENT/Pulmonary:     (+) sleep apnea, doesn't use CPAP,              tobacco use, Past use,                       Neurologic: Comment: Foot drop, neuropathy      Cardiovascular:     (+) Dyslipidemia hypertension- -  CAD -  - stent- 2 Drug Eluting Stent.                               Previous cardiac testing   Echo: Date: Results:    Stress Test:  Date: 2022 Results:  1. A regadenoson infusion pharmacologic stress test was performed.     2. Functional capacity was not assessed.     3. The patient experienced no symptoms during the stress test.     4. Negative stress ECG for ST segment depression.     5. Myocardial perfusion imaging is equivocal.     6. Myocardial perfusion imaging reveals two small and mild fixed   perfusion   defects: mid laure-septal, and apical inferior. Appearance is equivocal   for   soft tissue attenuation vs. small regions of non-transmural infarct.     7. There is no significant territory of myocardial ischemia.     8. Normal left ventricular systolic function. Calculated LVEF 54 %.       ECG Reviewed:  Date: Results:    Cath:  Date: Results:      METS/Exercise Tolerance:     Hematologic:       Musculoskeletal:   (+)   "arthritis,             GI/Hepatic: Comment: Alcohol induced pancreatitis    (+) GERD, Asymptomatic on medication,           liver disease,       Renal/Genitourinary:     (+) renal disease, type: CRI,            Endo:     (+)  type II DM,   Using insulin,                 Psychiatric/Substance Use:     (+) psychiatric history depression and anxiety alcohol abuse H/O chronic opiod use .     Infectious Disease:       Malignancy:       Other:      (+)  , H/O Chronic Pain,         Physical Exam    Airway        Mallampati: III   TM distance: > 3 FB   Neck ROM: full   Mouth opening: > 3 cm    Respiratory Devices and Support         Dental       (+) Multiple crowns, permanant bridges      Cardiovascular   cardiovascular exam normal          Pulmonary   pulmonary exam normal              OUTSIDE LABS:  CBC:   Lab Results   Component Value Date    WBC 8.5 05/25/2024    WBC 12.3 (H) 04/25/2019    HGB 14.6 05/25/2024    HGB 15.1 04/25/2019    HCT 43.8 05/25/2024    HCT 44.2 04/25/2019     05/25/2024     04/25/2019     BMP:   Lab Results   Component Value Date     05/30/2024     05/29/2024    POTASSIUM 4.6 05/30/2024    POTASSIUM 5.1 05/29/2024    CHLORIDE 107 05/30/2024    CHLORIDE 106 05/29/2024    CO2 21 (L) 05/30/2024    CO2 21 (L) 05/29/2024    BUN 48.0 (H) 05/30/2024    BUN 46.6 (H) 05/29/2024    CR 1.27 (H) 05/30/2024    CR 1.24 (H) 05/29/2024     (H) 05/30/2024    GLC 54 (L) 05/30/2024     COAGS: No results found for: \"PTT\", \"INR\", \"FIBR\"  POC:   Lab Results   Component Value Date     (H) 04/25/2019     HEPATIC: No results found for: \"ALBUMIN\", \"PROTTOTAL\", \"ALT\", \"AST\", \"GGT\", \"ALKPHOS\", \"BILITOTAL\", \"BILIDIRECT\", \"BIRD\"  OTHER:   Lab Results   Component Value Date    LACT 1.3 04/25/2019    A1C 7.1 (H) 05/25/2024    BELA 9.3 05/30/2024       Anesthesia Plan    ASA Status:  3    NPO Status:  NPO Appropriate    Anesthesia Type: General.     - Airway: ETT   Induction: Intravenous, " "Propofol.   Maintenance: Balanced.   Techniques and Equipment:     - Airway: Video-Laryngoscope       Consents    Anesthesia Plan(s) and associated risks, benefits, and realistic alternatives discussed. Questions answered and patient/representative(s) expressed understanding.     - Discussed:     - Discussed with:  Patient            Postoperative Care    Pain management: IV analgesics.   PONV prophylaxis: Ondansetron (or other 5HT-3), Dexamethasone or Solumedrol, Background Propofol Infusion     Comments:               Colt Lopez MD    I have reviewed the pertinent notes and labs in the chart from the past 30 days and (re)examined the patient.  Any updates or changes from those notes are reflected in this note.              # DMII: A1C = 7.1 % (Ref range: <5.7 %) within past 6 months  # Obesity: Estimated body mass index is 37.67 kg/m  as calculated from the following:    Height as of 6/18/24: 1.88 m (6' 2\").    Weight as of 6/18/24: 133.1 kg (293 lb 6.4 oz).      "

## 2024-07-10 NOTE — BRIEF OP NOTE
[facility] Brief Operative Note    Pre-operative diagnosis: Lumbar radiculopathy [M54.16]   Post-operative diagnosis * No post-op diagnosis entered *same   Procedure: Procedure(s):  LEFT LUMBAR 3, LUMBAR 4 LATERAL RECESS DECOMPRESSION AND MICRODISCECTOMY   Surgeon(s): Surgeons and Role:     * Dom Price MD - Primary     * Emeli Black PA-C - Assisting   Estimated blood loss: 5 mL    Specimens: * No specimens in log *   Findings: Satisfactory lateral recess decompression and microlumbar discectomy achieved

## 2024-07-10 NOTE — ANESTHESIA PROCEDURE NOTES
Airway       Patient location during procedure: OR ([facility] - Operating Room or Procedural Area)       Procedure Start/Stop Times: 7/10/2024 10:56 AM  Staff -        Anesthesiologist:  Colt Lopez MD       CRNA: Kaity Noriega APRN CRNA       Performed By: CRNAIndications and Patient Condition       Indications for airway management: jas-procedural       Induction type:intravenous       Mask difficulty assessment: 3 - difficult mask (inadequate, unstable, or two providers) +/- NMBA (2-hand mask)    Final Airway Details       Final airway type: endotracheal airway       Successful airway: ETT - single  Endotracheal Airway Details        ETT size (mm): 8.0       Cuffed: yes       Successful intubation technique: video laryngoscopy       VL Blade Size: Reich 4       Grade View of Cords: 1       Adjucts: stylet       Position: Right       Measured from: lips       Secured at (cm): 23       Bite block used: None    Post intubation assessment        Placement verified by: capnometry, equal breath sounds and chest rise        Number of attempts at approach: 1       Number of other approaches attempted: 0       Secured with: tape       Ease of procedure: easy       Dentition: Intact and Unchanged    Medication(s) Administered   Medication Administration Time: 7/10/2024 10:56 AM

## 2024-07-10 NOTE — OP NOTE
Date of surgery: July 10, 2024    Surgeon: Dom Price MD  Assistant:Emeli Black PA-C - Assisting  (Note: The assistant was present for and assisted with the entire surgery, and his/her role as an assistant was crucial for aid in positioning, exposure, suctioning, retraction, and closure).  No qualified resident was available for the procedure.    Preoperative diagnosis: L3-4 lumbar disc herniation with lateral recess and central canal stenosis  Postoperative diagnosis: Same    Procedure:  1.  Left L3 decompressive hemilaminotomy, medial facetectomy, lateral recess decompression and microdiscectomy  2.  Use of [employer] minimally invasive system  3.  Use of intraoperative microscope and fluoroscopy    EBL: 5 mL    Indications: Mr. Traaseth is a 59-year-old right-handed gentleman with significant past medical history of diabetes mellitus type 2 insulin-dependent last HbA1c of 7.5, hypertension, coronary artery disease s/p coronary stents x 2 on 5/11/2021 on 81 mg aspirin, obesity BMI of 37.67, JANAK, ADHD, depression, anxiety, spinal stenosis s/p lumbar 4 lumbar 5 decompression 20 years ago, initially presented to the [facility] from 5/25/2024 to 5/30/2024 with acute onset low back pain and left leg pain likely along left L4 dermatomal distribution.  He also underwent left L3-4 transforaminal epidural steroid injection on 5/28/2024 with minimal to no improvement. I discussed the MRI lumbar spine with the patient and his spouse and showed them the images.  I explained to them that he has degenerative disc disease with HNP at multiple levels primarily at lumbar 3 lumbar 4 causing lateral recess stenosis on the left which is likely contributing to his symptoms.  I discussed the natural history of degenerative disc disease and also discussed the management options including conservative treatment with physical therapy, non surgical interventions, decompression alone and decompression with fusion.  Given new onset weakness and failure of conservative management I would recommend minimally invasive left lumbar 3 lumbar 4 lateral recess decompression.  Further physical therapy or conservative management is unlikely to help the patient with the symptoms of new onset weakness.  Therefore I would recommend decompression of L3-4 lateral recess stenosis.       I discussed the risk and benefits of the surgery including but not limited to bleeding, infection, injury to the thecal sac, CSF leak, injury to the nerve root, incomplete relief of symptoms, spine instability requiring further fusion surgery, MI/DVT/PE, need for prolonged rehabilitation and others      Description of surgery:   Patient was brought to the OR and general anesthesia was induced.  IV lines were placed.  The patient was positioned prone on a Magnus frame with appropriate padding of pressure points.  Sterile prepping and draping procedures were performed.  Antibiotics were administered and timeout was performed.      A paramedian lumbar incision was performed approximately 15 mm from the midline.  The minimally invasive dilating system was used to dock on the hemilamina of L3, 18/7 cm tube was used.  The microscope was brought into the field, and under microscopy, decompressive hemilaminotomy and medial facetectomy were performed with the high speed drill and Kerrison rongeurs.  The kerrison rongeur was used to remove the ligamentum flavum, and the thecal sac and nerve root were exposed.  The nerve root was retracted medially, and the large extruded disk fragment was removed with the pituitary rongeurs.  There was a breach in the annulus fibrosis.  The annulus fibrosis was opened using 15 knife and free disc fragments were removed.  Microdiscectomy was performed and the bulging annulus fibrosis was pushed back into the disc space to relieve central canal stenosis.  Nerve root was well decompressed proximally and distally at the end of the  decompression.  Satisfactory decompression of the central and  left lateral recess stenosis achieved.     Following satisfactory decompression, hemostasis was achieved and antibiotic irrigation was performed.  The fascia was closed using 2-0 Vicryl.  The dermal layer was closed with 2-0 vicryl sutures and skin was closed using Monocryl with Dermabond. There were no intraprocedural complications.  Counts were correct x 2.  Patient was transferred to the PACU in stable condition. I updated patient's spouse after the surgery.    Dom Price MD

## 2024-07-10 NOTE — PROVIDER NOTIFICATION
MD Notification    Notified Person: PA    Notified Person Name: Benita Fraire     Notification Date/Time: 7/10/24 @ 1716    Notification Interaction: Hover[employer] web    Purpose of Notification: 5646 (MT) POD 0 of L3-4 decompression. Allergic to codeine or meds with codeine, but he said that he's able to tolerates it with Benadryl. Pls, can you put an order for PRN Benadryl    Orders Received:PRN Benadryl ordered    Comments:

## 2024-07-10 NOTE — ANESTHESIA POSTPROCEDURE EVALUATION
Patient: Mark Milton Traaseth    Procedure: Procedure(s):  LEFT LUMBAR 3, LUMBAR 4 LATERAL RECESS DECOMPRESSION AND MICRODISCECTOMY       Anesthesia Type:  General    Note:     Postop Pain Control: Uneventful            Sign Out: Well controlled pain   PONV: No   Neuro/Psych: Uneventful            Sign Out: Acceptable/Baseline neuro status   Airway/Respiratory: Uneventful            Sign Out: Acceptable/Baseline resp. status   CV/Hemodynamics: Uneventful            Sign Out: Acceptable CV status; No obvious hypovolemia; No obvious fluid overload   Other NRE: NONE   DID A NON-ROUTINE EVENT OCCUR? No           Last vitals:  Vitals Value Taken Time   /87 07/10/24 1330   Temp 36.6  C (97.9  F) 07/10/24 1315   Pulse 63 07/10/24 1340   Resp 18 07/10/24 1340   SpO2 95 % 07/10/24 1340   Vitals shown include unfiled device data.    Electronically Signed By: Colt Lopez MD  July 10, 2024  1:40 PM

## 2024-07-10 NOTE — ANESTHESIA CARE TRANSFER NOTE
Patient: Mark Milton Traaseth    Procedure: Procedure(s):  LEFT LUMBAR 3, LUMBAR 4 LATERAL RECESS DECOMPRESSION AND MICRODISCECTOMY       Diagnosis: Lumbar radiculopathy [M54.16]  Diagnosis Additional Information: No value filed.    Anesthesia Type:   General     Note:    Oropharynx: oropharynx clear of all foreign objects and spontaneously breathing  Level of Consciousness: drowsy  Oxygen Supplementation: face mask  Level of Supplemental Oxygen (L/min / FiO2): 6  Independent Airway: airway patency satisfactory and stable  Dentition: dentition unchanged  Vital Signs Stable: post-procedure vital signs reviewed and stable  Report to RN Given: handoff report given  Patient transferred to: PACU    Handoff Report: Identifed the Patient, Identified the Reponsible Provider, Reviewed the pertinent medical history, Discussed the surgical course, Reviewed Intra-OP anesthesia mangement and issues during anesthesia, Set expectations for post-procedure period and Allowed opportunity for questions and acknowledgement of understanding    Vitals:  Vitals Value Taken Time   /79    Temp     Pulse 59 07/10/24 1255   Resp 24 07/10/24 1255   SpO2 95 % 07/10/24 1255   Vitals shown include unfiled device data.    Electronically Signed By: MARLENE Rodriguez CRNA  July 10, 2024  12:57 PM

## 2024-07-11 ENCOUNTER — APPOINTMENT (OUTPATIENT)
Dept: PHYSICAL THERAPY | Facility: CLINIC | Age: 59
End: 2024-07-11
Attending: NEUROLOGICAL SURGERY
Payer: COMMERCIAL

## 2024-07-11 VITALS
TEMPERATURE: 97.7 F | BODY MASS INDEX: 36.9 KG/M2 | RESPIRATION RATE: 16 BRPM | SYSTOLIC BLOOD PRESSURE: 167 MMHG | HEIGHT: 74 IN | HEART RATE: 69 BPM | OXYGEN SATURATION: 93 % | DIASTOLIC BLOOD PRESSURE: 72 MMHG | WEIGHT: 287.5 LBS

## 2024-07-11 LAB
CREAT SERPL-MCNC: 1.1 MG/DL (ref 0.67–1.17)
EGFRCR SERPLBLD CKD-EPI 2021: 78 ML/MIN/1.73M2
GLUCOSE BLDC GLUCOMTR-MCNC: 140 MG/DL (ref 70–99)
GLUCOSE BLDC GLUCOMTR-MCNC: 147 MG/DL (ref 70–99)
GLUCOSE BLDC GLUCOMTR-MCNC: 149 MG/DL (ref 70–99)

## 2024-07-11 PROCEDURE — 97110 THERAPEUTIC EXERCISES: CPT | Mod: GP | Performed by: PHYSICAL THERAPIST

## 2024-07-11 PROCEDURE — 97116 GAIT TRAINING THERAPY: CPT | Mod: GP | Performed by: PHYSICAL THERAPIST

## 2024-07-11 PROCEDURE — 250N000013 HC RX MED GY IP 250 OP 250 PS 637: Performed by: NEUROLOGICAL SURGERY

## 2024-07-11 PROCEDURE — 97530 THERAPEUTIC ACTIVITIES: CPT | Mod: GP | Performed by: PHYSICAL THERAPIST

## 2024-07-11 PROCEDURE — 250N000013 HC RX MED GY IP 250 OP 250 PS 637

## 2024-07-11 PROCEDURE — 36415 COLL VENOUS BLD VENIPUNCTURE: CPT | Performed by: NEUROLOGICAL SURGERY

## 2024-07-11 PROCEDURE — 97162 PT EVAL MOD COMPLEX 30 MIN: CPT | Mod: GP | Performed by: PHYSICAL THERAPIST

## 2024-07-11 PROCEDURE — 82565 ASSAY OF CREATININE: CPT | Performed by: NEUROLOGICAL SURGERY

## 2024-07-11 PROCEDURE — 82962 GLUCOSE BLOOD TEST: CPT

## 2024-07-11 PROCEDURE — 250N000011 HC RX IP 250 OP 636: Performed by: PHYSICIAN ASSISTANT

## 2024-07-11 RX ORDER — HYDROMORPHONE HYDROCHLORIDE 2 MG/1
2 TABLET ORAL EVERY 6 HOURS PRN
Qty: 40 TABLET | Refills: 0 | Status: ON HOLD | OUTPATIENT
Start: 2024-07-11 | End: 2024-08-10

## 2024-07-11 RX ORDER — ASPIRIN 81 MG/1
81 TABLET ORAL DAILY
COMMUNITY
Start: 2024-07-24

## 2024-07-11 RX ORDER — AMOXICILLIN 250 MG
1 CAPSULE ORAL 2 TIMES DAILY PRN
Qty: 40 TABLET | Refills: 0 | Status: SHIPPED | OUTPATIENT
Start: 2024-07-11

## 2024-07-11 RX ORDER — HYDROXYZINE HYDROCHLORIDE 10 MG/1
10-20 TABLET, FILM COATED ORAL 2 TIMES DAILY PRN
Qty: 40 TABLET | Refills: 0 | Status: SHIPPED | OUTPATIENT
Start: 2024-07-11

## 2024-07-11 RX ORDER — METHOCARBAMOL 500 MG/1
1000 TABLET, FILM COATED ORAL EVERY 6 HOURS PRN
Qty: 60 TABLET | Refills: 0 | Status: SHIPPED | OUTPATIENT
Start: 2024-07-11

## 2024-07-11 RX ORDER — OXYCODONE HYDROCHLORIDE 5 MG/1
5 TABLET ORAL EVERY 4 HOURS PRN
Qty: 40 TABLET | Refills: 0 | Status: SHIPPED | OUTPATIENT
Start: 2024-07-11 | End: 2024-07-11

## 2024-07-11 RX ADMIN — SENNOSIDES AND DOCUSATE SODIUM 1 TABLET: 50; 8.6 TABLET ORAL at 09:16

## 2024-07-11 RX ADMIN — TORSEMIDE 40 MG: 20 TABLET ORAL at 09:15

## 2024-07-11 RX ADMIN — LEVOTHYROXINE SODIUM 50 MCG: 50 TABLET ORAL at 09:15

## 2024-07-11 RX ADMIN — CARVEDILOL 25 MG: 25 TABLET, FILM COATED ORAL at 09:14

## 2024-07-11 RX ADMIN — PANTOPRAZOLE SODIUM 40 MG: 40 TABLET, DELAYED RELEASE ORAL at 09:15

## 2024-07-11 RX ADMIN — HYDRALAZINE HYDROCHLORIDE 100 MG: 50 TABLET ORAL at 05:07

## 2024-07-11 RX ADMIN — OXYCODONE HYDROCHLORIDE 5 MG: 5 TABLET ORAL at 09:21

## 2024-07-11 RX ADMIN — ACETAMINOPHEN 975 MG: 325 TABLET, FILM COATED ORAL at 05:07

## 2024-07-11 RX ADMIN — LOSARTAN POTASSIUM 50 MG: 50 TABLET, FILM COATED ORAL at 09:14

## 2024-07-11 RX ADMIN — BUPROPION HYDROCHLORIDE 300 MG: 300 TABLET, EXTENDED RELEASE ORAL at 09:15

## 2024-07-11 RX ADMIN — DIPHENHYDRAMINE HYDROCHLORIDE 25 MG: 50 INJECTION, SOLUTION INTRAMUSCULAR; INTRAVENOUS at 09:22

## 2024-07-11 RX ADMIN — AMLODIPINE BESYLATE 10 MG: 10 TABLET ORAL at 09:15

## 2024-07-11 RX ADMIN — PREGABALIN 50 MG: 50 CAPSULE ORAL at 09:15

## 2024-07-11 RX ADMIN — HYDROXYZINE HYDROCHLORIDE 10 MG: 10 TABLET ORAL at 05:09

## 2024-07-11 ASSESSMENT — ACTIVITIES OF DAILY LIVING (ADL)
ADLS_ACUITY_SCORE: 41
ADLS_ACUITY_SCORE: 41
ADLS_ACUITY_SCORE: 39
ADLS_ACUITY_SCORE: 41

## 2024-07-11 NOTE — DISCHARGE SUMMARY
"NEUROSURGERY DISCHARGE SUMMARY    Patient Name:  Mark Milton Traaseth  MRN:  [id number]      :  1965      Date of Admission:  7/10/2024  Date of Discharge:  2024  Admitting Physician:  Dom Price MD  Discharge Physician:    Primary Care Provider:   True Nogueira  Discharge Disposition:   Discharged to home    Admission Diagnoses:  Lumbar radiculopathy    Discharge Diagnoses:    Same    Brief History of Illness:   initially presented to the [facility] from 2024 to 2024 with acute onset low back pain and left leg pain likely along left L4 dermatomal distribution. He also underwent left L3-4 transforaminal epidural steroid injection on 2024 with minimal to no improvement.  Decision was ultimately made to proceed with surgical intervention.    Hospital Course:  Patient underwent elective lumbar 3-4 decompression and microdiscectomy with Dr. Price and was discharged postop day 1 with no complications.    Patient asked me to remove Dilaudid allergy as he was already prescribed Dilaudid and prefers it over oxycodone.  Patient also requested refills of his prior Atarax prescription.    Pertinent Investigations:  None    Consultations:    None      Discharge physical examination:   BP (!) 167/72 (BP Location: Left arm)   Pulse 69   Temp 97.7  F (36.5  C) (Oral)   Resp 16   Ht 1.88 m (6' 2.02\")   Wt 130.4 kg (287 lb 8 oz)   SpO2 93%   BMI 36.90 kg/m    General: Awake and alert , Lying in bed, NAD,cooperative  HEENT: Normocephalic, atraumatic, no epistaxis, no oral lesions, MMM  Resp: CTAB, no increased work of breathing  Cardio: Adequately perfused  Abdomen: +BS, Soft, non-distended, non-tender  Extremities: Warm and well perfused, peripheral pulses present, no edema  Skin: Surgical incision closed with running Monocryl dissolvable suture  Psych: Normal mood and affect    Neuro:  Patient states preoperative left leg pain has resolved.  He does have some back pain that he " believes to be incisional.  Incision looks clean dry and intact with minimal drainage on dressing which was removed.  He denies any numbness or tingling.  Patient has 5 out of 5 strength of bilateral lower extremities in all fields.    Discharge Medications:  Current Discharge Medication List        START taking these medications    Details   senna-docusate (SENOKOT-S/PERICOLACE) 8.6-50 MG tablet Take 1 tablet by mouth 2 times daily as needed for constipation  Qty: 40 tablet, Refills: 0    Associated Diagnoses: History of spinal surgery           CONTINUE these medications which have CHANGED    Details   aspirin 81 MG EC tablet Take 1 tablet (81 mg) by mouth daily      HYDROmorphone (DILAUDID) 2 MG tablet Take 1 tablet (2 mg) by mouth every 6 hours as needed for pain  Qty: 40 tablet, Refills: 0    Associated Diagnoses: History of spinal surgery      hydrOXYzine HCl (ATARAX) 10 MG tablet Take 1-2 tablets (10-20 mg) by mouth 2 times daily as needed for itching or anxiety  Qty: 40 tablet, Refills: 0    Associated Diagnoses: History of spinal surgery      methocarbamol (ROBAXIN) 500 MG tablet Take 2 tablets (1,000 mg) by mouth every 6 hours as needed for muscle spasms  Qty: 60 tablet, Refills: 0    Associated Diagnoses: Type 2 diabetes mellitus with other specified complication, without long-term current use of insulin (H)           CONTINUE these medications which have NOT CHANGED    Details   acetaminophen (TYLENOL) 500 MG tablet Take 2 tablets (1,000 mg) by mouth 3 times daily  Qty: 100 tablet, Refills: 0    Associated Diagnoses: Intractable low back pain      allopurinol (ZYLOPRIM) 100 MG tablet Take 100 mg by mouth daily as needed (elevated purines)      amLODIPine (NORVASC) 10 MG tablet Take 10 mg by mouth daily      buPROPion (WELLBUTRIN XL) 300 MG 24 hr tablet Take 300 mg by mouth every morning      carvedilol (COREG) 25 MG tablet Take 1 tablet (25 mg) by mouth 2 times daily (with meals)  Qty: 60 tablet,  Refills: 0    Associated Diagnoses: Benign essential hypertension      SELLERS CONCENTRATE PO Take 2 tablets by mouth 3 times daily      CHROMIUM PICOLINATE PO Take 1 tablet by mouth 2 times daily (before meals)      cloNIDine (CATAPRES-TTS1) 0.1 MG/24HR WK patch Place 1 patch onto the skin once a week      DANDELION PO Take 1 tablet by mouth daily      hydrALAZINE (APRESOLINE) 100 MG tablet Take 1 tablet (100 mg) by mouth every 8 hours  Qty: 90 tablet, Refills: 0    Associated Diagnoses: Benign essential hypertension      !! insulin glargine (LANTUS PEN) 100 UNIT/ML pen Inject 15 Units Subcutaneous every morning  Qty: 15 mL, Refills: 0    Comments: If Lantus is not covered by insurance, may substitute Basaglar or Semglee or other insulin glargine product per insurance preference at same dose and frequency.    Associated Diagnoses: Type 2 diabetes mellitus with other specified complication, without long-term current use of insulin (H)      !! insulin glargine (LANTUS PEN) 100 UNIT/ML pen Inject 100 Units Subcutaneous at bedtime      insulin lispro (HUMALOG KWIKPEN) 100 UNIT/ML (1 unit dial) KWIKPEN Inject 0-12 Units subcutaneously 3 times daily (before meals) Hasn't been using much, but if BG>190 he takes 12 units    Also uses 1 unit for 15g carbohydrates.      insulin pen needle (30G X 8 MM) 30G X 8 MM miscellaneous Use 4 pen needles daily or as directed.      L-GLUTAMINE PO Take 5 g by mouth 2 times daily      !! Levomefolic Acid (5-MTHF PO)       !! Levomefolic Acid (5-MTHF) 1 MG CAPS Take 1-7 mg by mouth daily      levothyroxine (SYNTHROID/LEVOTHROID) 50 MCG tablet Take 50 mcg by mouth daily      losartan (COZAAR) 50 MG tablet Take 50 mg by mouth daily      nitroGLYcerin (NITROSTAT) 0.4 MG sublingual tablet Place 0.4 mg under the tongue every 5 minutes as needed for chest pain For chest pain place 1 tablet under the tongue every 5 minutes for 3 doses. If symptoms persist 5 minutes after 1st dose call 911.       omeprazole (PRILOSEC) 20 MG DR capsule Take 20 mg by mouth daily      pregabalin (LYRICA) 50 MG capsule Take 1 capsule (50 mg) by mouth 3 times daily  Qty: 90 capsule, Refills: 0    Associated Diagnoses: Intractable low back pain      pyridOXINE (VITAMIN B-6) 50 MG tablet Take 50 mg by mouth daily      red yeast rice 600 MG CAPS Take 600 mg by mouth 2 times daily      sildenafil (REVATIO) 20 MG tablet Take 100 mg by mouth daily as needed      tamsulosin (FLOMAX) 0.4 MG capsule Take 1 capsule (0.4 mg) by mouth daily  Qty: 30 capsule, Refills: 0    Associated Diagnoses: Benign prostatic hyperplasia with urinary retention      tirzepatide (MOUNJARO) 10 MG/0.5ML pen Inject 10 mg Subcutaneous every 7 days      torsemide (DEMADEX) 20 MG tablet Take 40 mg by mouth daily      valACYclovir (VALTREX) 1000 mg tablet Take 1,000 mg by mouth 2 times daily as needed X 1 day for cold sores      vitamin B complex with vitamin C (VITAMIN  B COMPLEX) tablet Take 1 tablet by mouth daily      MAGNESIUM GLYCINATE PO Take 2 capsules by mouth 2 times daily       !! - Potential duplicate medications found. Please discuss with provider.        STOP taking these medications       cyclobenzaprine (FLEXERIL) 10 MG tablet Comments:   Reason for Stopping:               Discharge follow up and instructions:     Discharge Instructions    Review outpatient procedure discharge instructions as directed by Provider.     Discharge Instructions - Change dressing    Wash hands prior to changing dressing daily. If no drainage on dressing, may leave open to air.     No driving or operating machinery while in a cervical collar    Until follow-up appointment.     Discharge Instructions - Shower with incision NOT covered    OK to leave incision open to air. Monitor for any increase in drainage, redness, pain or warmth and contact our office. You may shower but do not soak or scrub your incision.     Discharge Instructions - No tub bathing    Tub bathing,  swimming, or any other activities that will cause your incision to be submerged in water should be avoided until allowed by your Provider.     Discharge Instructions - Diet    Diet as tolerated. Return to diet before surgery.     Discharge Instructions - Lifting Limit (specify)    Please limit your lifting to no more that ten pounds and avoid excessive bending, twisting and turning at the lumbar spine. You should also avoid excessive jostling and jarring activities.     Return to Clinic - in 2 weeks    Return to Clinic in 2 weeks  Your Neurosurgical follow-up appointments have been scheduled. You may call [phone] to make, confirm or change your appointment dates and/or times.     No Aspirin or NSAID products    No aspirin or non-steroidal anti-inflammatory drugs (NSAIDs) such as ibuprofen or naproxen until 2 weeks post operatively     Reason for your hospital stay    Elective LEFT LUMBAR 3, LUMBAR 4 LATERAL RECESS DECOMPRESSION AND MICRODISCECTOMY with Dr. Price.     Follow-up and recommended labs and tests     Your post-operative follow up appointments have been/will be made for two weeks with a nurse for a well being and wound check visit as well as in six weeks with a provider.     Activity    Listed individually     Diet    Follow this diet upon discharge: Orders Placed This Encounter      Advance Diet as Tolerated: Regular Diet Adult       Patient seen and discussed with Dr. Albert Valenzuela, [facility]  Neurosurgery  [phone]

## 2024-07-11 NOTE — PLAN OF CARE
Goal Outcome Evaluation:         Pt AO4.  SBA in room.  Dressing CDI.  +CMS.  VSS On RA.  Voiding.  Using dilaudid + benedryl for pain, tried atarax this morning as patient aware he cannot go home on IV.  Plan for possible discharge today.

## 2024-07-11 NOTE — PROVIDER NOTIFICATION
MD Notification    Notified Person: MD    Notified Person Name: Nicolas    Notification Date/Time: 7/11 1008    Notification Interaction: web page    Purpose of Notification: med request     Orders Received: pending     Comments: Pt states the diluadid works better for pain management than oxy and he has less of an allergic reaction. Can we send him home with oral dilaudid rather than the oxy?

## 2024-07-11 NOTE — PROGRESS NOTES
07/11/24 0830   Appointment Info   Signing Clinician's Name / Credentials (PT) Elayne Burnham, PT, DPT   Living Environment   People in Home spouse   Current Living Arrangements house   Home Accessibility stairs to enter home   Number of Stairs, Main Entrance greater than 10 stairs   Transportation Anticipated family or friend will provide   Self-Care   Usual Activity Tolerance good   Current Activity Tolerance moderate   Equipment Currently Used at Home none   Fall history within last six months yes   Number of times patient has fallen within last six months 5  (Left leg buckling, one fall in the garage, hit head)   General Information   Onset of Illness/Injury or Date of Surgery 07/10/24   Referring Physician Emeli lBack PA-C   Patient/Family Therapy Goals Statement (PT) Feel better, go home   Pertinent History of Current Problem (include personal factors and/or comorbidities that impact the POC) 59-year-old right-handed gentleman with significant past medical history of diabetes mellitus type 2 insulin-dependent last HbA1c of 7.5, hypertension, coronary artery disease s/p coronary stents x 2 on 5/11/2021 on 81 mg aspirin, obesity BMI of 37.67, JANAK, ADHD, depression, anxiety, spinal stenosis s/p lumbar 4 lumbar 5 decompression 20 years ago, initially presented to the [facility] from 5/25/2024 to 5/30/2024 with acute onset low back pain and left leg pain likely along left L4 dermatomal distribution. He also underwent left L3-4 transforaminal epidural steroid injection on 5/28/2024 with minimal to no improvement.   Procedure:LEFT LUMBAR 3, LUMBAR 4 LATERAL RECESS DECOMPRESSION AND MICRODISCECTOMY   Existing Precautions/Restrictions fall   Cognition   Affect/Mental Status (Cognition) WFL   Orientation Status (Cognition) oriented x 4   Follows Commands (Cognition) WNL   Safety Deficit (Cognition) minimal deficit;awareness of need for assistance;insight into deficits/self-awareness   Pain  Assessment   Patient Currently in Pain Yes, see Vital Sign flowsheet  (6/10 low back pain)   Integumentary/Edema   Integumentary/Edema Comments Incisional area intact   Posture    Posture Forward head position;Protracted shoulders;Kyphosis   Range of Motion (ROM)   Range of Motion ROM deficits secondary to pain   Strength (Manual Muscle Testing)   Strength (Manual Muscle Testing) Deficits observed during functional mobility   Bed Mobility   Comment, (Bed Mobility) SBA   Transfers   Comment, (Transfers) SBA   Gait/Stairs (Locomotion)   Comment, (Gait/Stairs) SBA   Balance   Balance Comments Needs B UE support   Sensory Examination   Sensory Perception Comments Pt reports improved sensation L LE following sx   Coordination   Coordination no deficits were identified   Muscle Tone   Muscle Tone no deficits were identified   Clinical Impression   Criteria for Skilled Therapeutic Intervention Yes, treatment indicated   PT Diagnosis (PT) Impaired safety w/fn mob   Influenced by the following impairments Strength, balance, pain, activity tolerance, cardiovasc endurance   Functional limitations due to impairments Bed mob, transfers, amb, stairs   Clinical Presentation (PT Evaluation Complexity) evolving   Clinical Presentation Rationale Multiple affecting comorbidities, assessment incl strength, balance, fn mob. Evolving presentation with high pain.   Clinical Decision Making (Complexity) moderate complexity   Planned Therapy Interventions (PT) gait training;home exercise program;ROM (range of motion);stair training;transfer training;postural re-education;patient/family education;balance training;strengthening;stretching   Risk & Benefits of therapy have been explained patient;spouse/significant other   PT Total Evaluation Time   PT Eval, Moderate Complexity Minutes (72013) 5   Physical Therapy Goals   PT Frequency 2x/day   PT Predicted Duration/Target Date for Goal Attainment 07/21/24   PT Goals Bed  Mobility;Transfers;Gait;Stairs;Aerobic Activity   PT: Bed Mobility Modified independent   PT: Transfers Modified independent   PT: Gait Modified independent   PT: Stairs Supervision/stand-by assist   PT: Perform aerobic activity with stable cardiovascular response 5 minutes;10 minutes   Interventions   Interventions Quick Adds Therapeutic Activity;Gait Training;Therapeutic Procedure   Therapeutic Procedure/Exercise   Ther. Procedure: strength, endurance, ROM, flexibillity Minutes (28014) 10   Treatment Detail/Skilled Intervention Pt completed APs in supine, LAQs in sitting-educated pt on benefit of AROM for circulation and reduced stiffness in prep for OOB activity. At end of session, pt started on HEP heel slides and SLRs, and standing heel raises. Tolerated well.   Therapeutic Activity   Therapeutic Activities: dynamic activities to improve functional performance Minutes (13208) 15   Symptoms Noted During/After Treatment Increased pain   Treatment Detail/Skilled Intervention Pt in high pain throughout, friendly affect, jokes frequently, good attitutde. Gait training focusing on mechanics and indep with 2WW. Pt amb 200 with FWW and CGA, progresed to SBA by end of session. Verbal cues for posture and good return demo of safe technique with turning with walker. No losses of balance, no knee buckling. Patient ambulated up/down 12 steps with B railings using step to pattern and cues for sequencing. Tolerated well, SBA, no knee buckling.   Gait Training   Gait Training Minutes (83566) 15   Symptoms Noted During/After Treatment (Gait Training) increased pain   Treatment Detail/Skilled Intervention Skilled instruction on sup>sit with UE support. Pt able to push off using shoulders, SBA for coming to EOB. Incr in pain reported, educ and breathing cues provided. VSS. Sit<>stand from bed, patient pulling up on walker, cued for hand placement. Education with posture and body mechanics for standing and stand pivot transfer to  recliner. Sequencing with left leg and use of UEs on chair armrests for safety. Patient followed cues well, good recall. Encouraged to ambulate outside of therapy, use of call light. Chair alarm on, call light in reach. RN updated.   PT Discharge Planning   PT Plan Progress indep and safety w/fn mob, progress HEP, review stairs   PT Discharge Recommendation (DC Rec) home;home with outpatient physical therapy   PT Rationale for DC Rec Pt presents with high pain after decompression and microdisctomy, has supportive spouse willing and able to assist at d/c. Anticipate pt can d/c to prior living with continued use of personal 2WW and assist from spouse as needed. Given pt has had 5 falls in last 2 weeks and has ongoing reduced balance, recommend Oupatient PT to reduce falls risk, improve dynamic balance, return to prior indep lvl.   PT Brief overview of current status SBA w/2WW, high pain   PT Equipment Needed at Discharge   (Pt has personal 2WW in room)   Total Session Time   Timed Code Treatment Minutes 40   Total Session Time (sum of timed and untimed services) 45     [facility]  OUTPATIENT PHYSICAL THERAPY EVALUATION  PLAN OF TREATMENT FOR OUTPATIENT REHABILITATION  (COMPLETE FOR INITIAL CLAIMS ONLY)  Patient's Last Name, First Name, M.I.  YOB: 1965  Traaseth,Mark Milton                        Provider's Name  [facility] Medical Record No.  [id number]                             Onset Date:  07/10/24   Start of Care Date:   7/11/24   Type:     _X_PT   ___OT   ___SLP Medical Diagnosis:       s/p spine sx          PT Diagnosis:  Impaired safety w/fn mob Visits from SOC:  1     See note for plan of treatment, functional goals and certification details    I CERTIFY THE NEED FOR THESE SERVICES FURNISHED UNDER        THIS PLAN OF TREATMENT AND WHILE UNDER MY CARE     (Physician co-signature of this document indicates review and certification of  the therapy plan).

## 2024-07-11 NOTE — PROGRESS NOTES
Shift [id number]    Admitting Diagnosis: Lumbar radiculopathy [M54.16]   Vitals- VS, except HTN  Pain 7/10. Taking IV Dilaudid PRN. Last dose 1756  A&Ox4  Voiding -Yes via urinal  Mobility -Yes, 1/wlk/gb to chair  Tele -N/A  CMS -None  Lung Sounds Clear on 0L via RA  GI - continent  Dressing -Back incision CDI    Orders Placed This Encounter      Advance Diet as Tolerated: Regular Diet Adult       Others: L arm Clonidine patch in place. Need PRN Benadryl before any narcotic with codeine per pt. No concern at this time. Plan of care ongoing.

## 2024-07-22 ENCOUNTER — ALLIED HEALTH/NURSE VISIT (OUTPATIENT)
Dept: NEUROSURGERY | Facility: CLINIC | Age: 59
End: 2024-07-22
Payer: COMMERCIAL

## 2024-07-22 DIAGNOSIS — M54.16 LUMBAR RADICULOPATHY: Primary | ICD-10-CM

## 2024-07-22 PROCEDURE — 99207 PR NO CHARGE NURSE ONLY: CPT

## 2024-07-22 RX ORDER — METHYLPREDNISOLONE 4 MG
TABLET, DOSE PACK ORAL
Qty: 21 TABLET | Refills: 0 | Status: ON HOLD | OUTPATIENT
Start: 2024-07-22 | End: 2024-08-10

## 2024-07-22 NOTE — PROGRESS NOTES
Mark Traaseth is status post Left L3 decompressive hemilaminotomy, medial facetectomy, lateral recess decompression and microdiscectomy on 07/10/2024 with Dr. Price.  Preoperatively presented with acute onset low back pain and left leg pain likely along left L4 dermatomal distribution.   Today he returns in follow up for wound check. He presents with a chief complaint of ongoing mild pain in left buttock and anterior thigh associated with numbness. Endorses some weakness in left LE, uses a cane for safety. Gait and balance are stable. Takes Tylenol prn.     Per discussion with Dr. Price, will initiate MDP, resume NSAIDS after completion of oral steroid pack.    Surgical wound WNL - CDI, no signs of infection or skin breakdown.  Incision well-healed: good skin approximation, no redness or visible/palpable edema, no tenderness to palpation.  PT. AF, denies fever, chills or sweats.  Pt. reports that the symptoms are improved from pre-op.    Rosemary Cabral RN, CNRN

## 2024-07-22 NOTE — PATIENT INSTRUCTIONS
A dressing is not required.    Keep the wound clean.    Wash your hands before touching the wound.  Ensure that anyone assisting you in the care of your wound washes her/his hands before touching the wound. Good handwashing can decrease the risk of serious infection.    If you are unable to see your wound, have someone check the wound daily for redness, swelling,or drainage. A small amount of drainage is normal.    You may shower.  Pat the wound dry. Do not rub.    No tub baths until the wound is well healed.  Usually 5-6 weeks.     If you develop redness, swelling, drainage, or temp 101 or greater, call our clinic.      * No lifting, pushing or pulling greater than 5-10 pound (this is about a gallon of milk) for the first 6 weeks after surgery .  *No repetitive bending, twisting, or jarring activities for 6 weeks.  *No overhead work  *No aerobic or strenuous activity  *No activities with increased risk of falls  *You may move about your home as tolerated  *You may walk up and down stairs as tolerated  *You may increase your activity slowly over the next 6 weeks    WALKING PROGRAM: As you can tolerate, walk daily-start with 5-10 minutes of continuous walking. This is in addition to the walking that you do as part of your daily activities. Increase the time that you walk by 5 minutes every couple of days. Do not exceed 30-45 minutes of continuous walking until seen in follow-up. Walking is the best exercise after surgery.  **Listen to your body, if you find that you are more painful or fatigued, you may need to proceed more slowly.    **Do not smoke or expose yourself to second hand smoke. Cigarette smoke can delay healing and cause complications.     DRIVING:  We recommend that you do not drive while taking medications for pain or muscle spasms. Always read and follow the advice on your prescription bottle. If you have questions, speak with your pharmacist.  We recommend that you do not drive while wearing a brace,  as it could limit your range of motion.    WORK: If you plan to return to work before your 6 week post-op appointment, call and discuss with one of the nurses in the neurosurgery office.

## 2024-07-22 NOTE — PROGRESS NOTES
POSTOPERATIVE FOLLOW-UP NURSE VISIT NOTE    Procedure: LEFT LUMBAR 3, LUMBAR 4 LATERAL RECESS DECOMPRESSION AND MICRODISCECTOMY     Date: 7/10/24    Surgeon: Albert    Pre-op symptoms: low back pain and left leg pain     Post-op symptoms: ***    Pain and medications: ***  Hydromorphone 2 mg Q6 PRN: ***  Methocarbamol 1000 mg Q6 PRN: ***  Hydroxyzine 10-20 mg every day PRN: ***    Bracing compliance: N/A    Medications refilled: ***    Imaging ordered: N/A    Wound: *** CDI, *** approximated, *** drainage, *** erythema, *** temperature change compared to surrounding tissue, *** tender. *** Cleansed with iodine, *** removed, cleansed again with iodine, *** steri strips placed.    Monocryl, dermabond    Mark Milton Traaseth presents to the clinic today for removal of {:[id number]}.  The patient has had the {:515124} in place for {:10} days.  There has been no history of infection or drainage.  {:10} {:491758} are seen located on the ***.  The wound is healing well with no signs of infection.  Tetanus last injection ***.   All {:365988} were easily removed today.  Routine wound care discussed.  The patient will follow up as needed.

## 2024-08-07 ENCOUNTER — TELEPHONE (OUTPATIENT)
Dept: NEUROSURGERY | Facility: CLINIC | Age: 59
End: 2024-08-07
Payer: COMMERCIAL

## 2024-08-07 NOTE — TELEPHONE ENCOUNTER
[facility] Call Center    Phone Message    May a detailed message be left on voicemail: yes     Reason for Call: Symptoms or Concerns     If patient has red-flag symptoms, warm transfer to triage line    Current symptom or concern: Leg Pain that isn't being helped at all by the prednisone that was prescribed. Patient states he is unable to  his leg due to having no strength at all. Patient also stated that he still feel more sutures, and thinks that some were left in after his 2 week appt. Please call to discuss.     Symptoms have been present for:  4 week(s)    Has patient previously been seen for this? Yes    By  Price    Date: 7/10    Are there any new or worsening symptoms? No    Action Taken: Message routed to:  Other: Neurosurgery    Travel Screening: Not Applicable     Date of Service:

## 2024-08-09 ENCOUNTER — HOSPITAL ENCOUNTER (INPATIENT)
Facility: CLINIC | Age: 59
LOS: 1 days | Discharge: HOME | DRG: 556 | End: 2024-08-11
Attending: HOSPITALIST | Admitting: INTERNAL MEDICINE
Payer: COMMERCIAL

## 2024-08-09 DIAGNOSIS — M54.16 LEFT LUMBAR RADICULOPATHY: Primary | ICD-10-CM

## 2024-08-09 PROCEDURE — 99223 1ST HOSP IP/OBS HIGH 75: CPT | Performed by: INTERNAL MEDICINE

## 2024-08-09 NOTE — TELEPHONE ENCOUNTER
Pt's wife Kiara returned call.  She wants the appointment on 8/12.  She is requesting that this be scheduled and call her back to confirm and let her know what time.  Thanks.

## 2024-08-09 NOTE — TELEPHONE ENCOUNTER
Left message stating if symptoms are not improving should follow up in clinic with Dr. Price on 08/12/2024.  Rosemary Cabral RN, CNRN

## 2024-08-10 ENCOUNTER — APPOINTMENT (OUTPATIENT)
Dept: MRI IMAGING | Facility: CLINIC | Age: 59
DRG: 556 | End: 2024-08-10
Attending: STUDENT IN AN ORGANIZED HEALTH CARE EDUCATION/TRAINING PROGRAM
Payer: COMMERCIAL

## 2024-08-10 ENCOUNTER — APPOINTMENT (OUTPATIENT)
Dept: MRI IMAGING | Facility: CLINIC | Age: 59
DRG: 556 | End: 2024-08-10
Attending: PHYSICIAN ASSISTANT
Payer: COMMERCIAL

## 2024-08-10 PROBLEM — M54.16 LEFT LUMBAR RADICULOPATHY: Status: ACTIVE | Noted: 2024-08-10

## 2024-08-10 LAB
ANION GAP SERPL CALCULATED.3IONS-SCNC: 9 MMOL/L (ref 7–15)
BUN SERPL-MCNC: 27.6 MG/DL (ref 6–20)
CALCIUM SERPL-MCNC: 8.8 MG/DL (ref 8.8–10.4)
CHLORIDE SERPL-SCNC: 103 MMOL/L (ref 98–107)
CREAT SERPL-MCNC: 1.2 MG/DL (ref 0.67–1.17)
EGFRCR SERPLBLD CKD-EPI 2021: 71 ML/MIN/1.73M2
ERYTHROCYTE [DISTWIDTH] IN BLOOD BY AUTOMATED COUNT: 14 % (ref 10–15)
GLUCOSE BLDC GLUCOMTR-MCNC: 101 MG/DL (ref 70–99)
GLUCOSE BLDC GLUCOMTR-MCNC: 131 MG/DL (ref 70–99)
GLUCOSE BLDC GLUCOMTR-MCNC: 157 MG/DL (ref 70–99)
GLUCOSE BLDC GLUCOMTR-MCNC: 161 MG/DL (ref 70–99)
GLUCOSE BLDC GLUCOMTR-MCNC: 186 MG/DL (ref 70–99)
GLUCOSE BLDC GLUCOMTR-MCNC: 229 MG/DL (ref 70–99)
GLUCOSE BLDC GLUCOMTR-MCNC: 86 MG/DL (ref 70–99)
GLUCOSE SERPL-MCNC: 170 MG/DL (ref 70–99)
HCO3 SERPL-SCNC: 24 MMOL/L (ref 22–29)
HCT VFR BLD AUTO: 37 % (ref 40–53)
HGB BLD-MCNC: 12.7 G/DL (ref 13.3–17.7)
MCH RBC QN AUTO: 29.8 PG (ref 26.5–33)
MCHC RBC AUTO-ENTMCNC: 34.3 G/DL (ref 31.5–36.5)
MCV RBC AUTO: 87 FL (ref 78–100)
PLATELET # BLD AUTO: 226 10E3/UL (ref 150–450)
POTASSIUM SERPL-SCNC: 4 MMOL/L (ref 3.4–5.3)
RBC # BLD AUTO: 4.26 10E6/UL (ref 4.4–5.9)
SODIUM SERPL-SCNC: 136 MMOL/L (ref 135–145)
WBC # BLD AUTO: 6.2 10E3/UL (ref 4–11)

## 2024-08-10 PROCEDURE — 250N000012 HC RX MED GY IP 250 OP 636 PS 637: Performed by: INTERNAL MEDICINE

## 2024-08-10 PROCEDURE — 73721 MRI JNT OF LWR EXTRE W/O DYE: CPT | Mod: LT

## 2024-08-10 PROCEDURE — 80048 BASIC METABOLIC PNL TOTAL CA: CPT | Performed by: INTERNAL MEDICINE

## 2024-08-10 PROCEDURE — 36415 COLL VENOUS BLD VENIPUNCTURE: CPT | Performed by: INTERNAL MEDICINE

## 2024-08-10 PROCEDURE — 72146 MRI CHEST SPINE W/O DYE: CPT

## 2024-08-10 PROCEDURE — 99207 PR APP CREDIT; MD BILLING SHARED VISIT: CPT | Performed by: PHYSICIAN ASSISTANT

## 2024-08-10 PROCEDURE — 96376 TX/PRO/DX INJ SAME DRUG ADON: CPT

## 2024-08-10 PROCEDURE — 96361 HYDRATE IV INFUSION ADD-ON: CPT

## 2024-08-10 PROCEDURE — 250N000011 HC RX IP 250 OP 636: Performed by: INTERNAL MEDICINE

## 2024-08-10 PROCEDURE — 250N000013 HC RX MED GY IP 250 OP 250 PS 637: Performed by: INTERNAL MEDICINE

## 2024-08-10 PROCEDURE — 96374 THER/PROPH/DIAG INJ IV PUSH: CPT

## 2024-08-10 PROCEDURE — 258N000003 HC RX IP 258 OP 636: Performed by: INTERNAL MEDICINE

## 2024-08-10 PROCEDURE — 120N000001 HC R&B MED SURG/OB

## 2024-08-10 PROCEDURE — 99233 SBSQ HOSP IP/OBS HIGH 50: CPT | Performed by: INTERNAL MEDICINE

## 2024-08-10 PROCEDURE — 85027 COMPLETE CBC AUTOMATED: CPT | Performed by: INTERNAL MEDICINE

## 2024-08-10 PROCEDURE — G0378 HOSPITAL OBSERVATION PER HR: HCPCS

## 2024-08-10 RX ORDER — ACETAMINOPHEN 500 MG
1000 TABLET ORAL 3 TIMES DAILY
Status: DISCONTINUED | OUTPATIENT
Start: 2024-08-10 | End: 2024-08-11 | Stop reason: HOSPADM

## 2024-08-10 RX ORDER — NALOXONE HYDROCHLORIDE 0.4 MG/ML
0.4 INJECTION, SOLUTION INTRAMUSCULAR; INTRAVENOUS; SUBCUTANEOUS
Status: DISCONTINUED | OUTPATIENT
Start: 2024-08-10 | End: 2024-08-11 | Stop reason: HOSPADM

## 2024-08-10 RX ORDER — LIDOCAINE 40 MG/G
CREAM TOPICAL
Status: DISCONTINUED | OUTPATIENT
Start: 2024-08-10 | End: 2024-08-11 | Stop reason: HOSPADM

## 2024-08-10 RX ORDER — AMOXICILLIN 250 MG
1 CAPSULE ORAL 2 TIMES DAILY PRN
Status: DISCONTINUED | OUTPATIENT
Start: 2024-08-10 | End: 2024-08-11 | Stop reason: HOSPADM

## 2024-08-10 RX ORDER — ONDANSETRON 4 MG/1
4 TABLET, ORALLY DISINTEGRATING ORAL EVERY 6 HOURS PRN
Status: DISCONTINUED | OUTPATIENT
Start: 2024-08-10 | End: 2024-08-11 | Stop reason: HOSPADM

## 2024-08-10 RX ORDER — HYDROMORPHONE HYDROCHLORIDE 2 MG/1
4 TABLET ORAL EVERY 4 HOURS PRN
Status: DISCONTINUED | OUTPATIENT
Start: 2024-08-10 | End: 2024-08-11 | Stop reason: HOSPADM

## 2024-08-10 RX ORDER — AMOXICILLIN 250 MG
2 CAPSULE ORAL 2 TIMES DAILY PRN
Status: DISCONTINUED | OUTPATIENT
Start: 2024-08-10 | End: 2024-08-11 | Stop reason: HOSPADM

## 2024-08-10 RX ORDER — POLYETHYLENE GLYCOL 3350 17 G/17G
17 POWDER, FOR SOLUTION ORAL 2 TIMES DAILY PRN
Status: DISCONTINUED | OUTPATIENT
Start: 2024-08-10 | End: 2024-08-11 | Stop reason: HOSPADM

## 2024-08-10 RX ORDER — CARVEDILOL 12.5 MG/1
12.5 TABLET ORAL 2 TIMES DAILY WITH MEALS
Status: DISCONTINUED | OUTPATIENT
Start: 2024-08-10 | End: 2024-08-11 | Stop reason: HOSPADM

## 2024-08-10 RX ORDER — ACETAMINOPHEN 325 MG/1
650 TABLET ORAL EVERY 4 HOURS PRN
Status: DISCONTINUED | OUTPATIENT
Start: 2024-08-10 | End: 2024-08-11 | Stop reason: HOSPADM

## 2024-08-10 RX ORDER — PROCHLORPERAZINE 25 MG
25 SUPPOSITORY, RECTAL RECTAL EVERY 12 HOURS PRN
Status: DISCONTINUED | OUTPATIENT
Start: 2024-08-10 | End: 2024-08-11 | Stop reason: HOSPADM

## 2024-08-10 RX ORDER — AMOXICILLIN 250 MG
1 CAPSULE ORAL 2 TIMES DAILY PRN
Status: DISCONTINUED | OUTPATIENT
Start: 2024-08-10 | End: 2024-08-10

## 2024-08-10 RX ORDER — HYDROMORPHONE HCL IN WATER/PF 6 MG/30 ML
0.2 PATIENT CONTROLLED ANALGESIA SYRINGE INTRAVENOUS
Status: DISCONTINUED | OUTPATIENT
Start: 2024-08-10 | End: 2024-08-11 | Stop reason: HOSPADM

## 2024-08-10 RX ORDER — PROCHLORPERAZINE MALEATE 10 MG
10 TABLET ORAL EVERY 6 HOURS PRN
Status: DISCONTINUED | OUTPATIENT
Start: 2024-08-10 | End: 2024-08-11 | Stop reason: HOSPADM

## 2024-08-10 RX ORDER — DIPHENHYDRAMINE HCL 25 MG
25 CAPSULE ORAL EVERY 6 HOURS PRN
Status: DISCONTINUED | OUTPATIENT
Start: 2024-08-10 | End: 2024-08-11 | Stop reason: HOSPADM

## 2024-08-10 RX ORDER — HYDRALAZINE HYDROCHLORIDE 50 MG/1
100 TABLET, FILM COATED ORAL 2 TIMES DAILY
Status: DISCONTINUED | OUTPATIENT
Start: 2024-08-10 | End: 2024-08-11 | Stop reason: HOSPADM

## 2024-08-10 RX ORDER — ATORVASTATIN CALCIUM 40 MG/1
40 TABLET, FILM COATED ORAL DAILY
COMMUNITY

## 2024-08-10 RX ORDER — ATORVASTATIN CALCIUM 40 MG/1
40 TABLET, FILM COATED ORAL DAILY
Status: DISCONTINUED | OUTPATIENT
Start: 2024-08-10 | End: 2024-08-11 | Stop reason: HOSPADM

## 2024-08-10 RX ORDER — HYDRALAZINE HYDROCHLORIDE 10 MG/1
10 TABLET, FILM COATED ORAL EVERY 4 HOURS PRN
Status: DISCONTINUED | OUTPATIENT
Start: 2024-08-10 | End: 2024-08-11 | Stop reason: HOSPADM

## 2024-08-10 RX ORDER — HYDROXYZINE HYDROCHLORIDE 25 MG/1
25 TABLET, FILM COATED ORAL 2 TIMES DAILY PRN
Status: DISCONTINUED | OUTPATIENT
Start: 2024-08-10 | End: 2024-08-11 | Stop reason: HOSPADM

## 2024-08-10 RX ORDER — SODIUM CHLORIDE, SODIUM LACTATE, POTASSIUM CHLORIDE, CALCIUM CHLORIDE 600; 310; 30; 20 MG/100ML; MG/100ML; MG/100ML; MG/100ML
INJECTION, SOLUTION INTRAVENOUS CONTINUOUS
Status: DISCONTINUED | OUTPATIENT
Start: 2024-08-10 | End: 2024-08-10

## 2024-08-10 RX ORDER — ONDANSETRON 2 MG/ML
4 INJECTION INTRAMUSCULAR; INTRAVENOUS EVERY 6 HOURS PRN
Status: DISCONTINUED | OUTPATIENT
Start: 2024-08-10 | End: 2024-08-11 | Stop reason: HOSPADM

## 2024-08-10 RX ORDER — HYDROMORPHONE HCL IN WATER/PF 6 MG/30 ML
0.4 PATIENT CONTROLLED ANALGESIA SYRINGE INTRAVENOUS
Status: DISCONTINUED | OUTPATIENT
Start: 2024-08-10 | End: 2024-08-11 | Stop reason: HOSPADM

## 2024-08-10 RX ORDER — NALOXONE HYDROCHLORIDE 0.4 MG/ML
0.2 INJECTION, SOLUTION INTRAMUSCULAR; INTRAVENOUS; SUBCUTANEOUS
Status: DISCONTINUED | OUTPATIENT
Start: 2024-08-10 | End: 2024-08-11 | Stop reason: HOSPADM

## 2024-08-10 RX ORDER — NICOTINE POLACRILEX 4 MG
15-30 LOZENGE BUCCAL
Status: DISCONTINUED | OUTPATIENT
Start: 2024-08-10 | End: 2024-08-11 | Stop reason: HOSPADM

## 2024-08-10 RX ORDER — LOSARTAN POTASSIUM 50 MG/1
50 TABLET ORAL DAILY
Status: DISCONTINUED | OUTPATIENT
Start: 2024-08-10 | End: 2024-08-11 | Stop reason: HOSPADM

## 2024-08-10 RX ORDER — BUPROPION HYDROCHLORIDE 300 MG/1
300 TABLET ORAL EVERY MORNING
Status: DISCONTINUED | OUTPATIENT
Start: 2024-08-10 | End: 2024-08-11 | Stop reason: HOSPADM

## 2024-08-10 RX ORDER — ALLOPURINOL 100 MG/1
100 TABLET ORAL DAILY
Status: DISCONTINUED | OUTPATIENT
Start: 2024-08-10 | End: 2024-08-11 | Stop reason: HOSPADM

## 2024-08-10 RX ORDER — NITROGLYCERIN 0.4 MG/1
0.4 TABLET SUBLINGUAL EVERY 5 MIN PRN
Status: DISCONTINUED | OUTPATIENT
Start: 2024-08-10 | End: 2024-08-11 | Stop reason: HOSPADM

## 2024-08-10 RX ORDER — DEXTROSE MONOHYDRATE 25 G/50ML
25-50 INJECTION, SOLUTION INTRAVENOUS
Status: DISCONTINUED | OUTPATIENT
Start: 2024-08-10 | End: 2024-08-11 | Stop reason: HOSPADM

## 2024-08-10 RX ORDER — METHOCARBAMOL 500 MG/1
1000 TABLET, FILM COATED ORAL EVERY 6 HOURS PRN
Status: DISCONTINUED | OUTPATIENT
Start: 2024-08-10 | End: 2024-08-11 | Stop reason: HOSPADM

## 2024-08-10 RX ORDER — LEVOTHYROXINE SODIUM 25 UG/1
50 TABLET ORAL DAILY
Status: DISCONTINUED | OUTPATIENT
Start: 2024-08-10 | End: 2024-08-11 | Stop reason: HOSPADM

## 2024-08-10 RX ORDER — CHOLECALCIFEROL (VITAMIN D3) 1250 MCG
1250 CAPSULE ORAL
COMMUNITY

## 2024-08-10 RX ORDER — ACETAMINOPHEN 650 MG/1
650 SUPPOSITORY RECTAL EVERY 4 HOURS PRN
Status: DISCONTINUED | OUTPATIENT
Start: 2024-08-10 | End: 2024-08-11 | Stop reason: HOSPADM

## 2024-08-10 RX ORDER — AMLODIPINE BESYLATE 10 MG/1
10 TABLET ORAL DAILY
Status: DISCONTINUED | OUTPATIENT
Start: 2024-08-10 | End: 2024-08-11 | Stop reason: HOSPADM

## 2024-08-10 RX ORDER — PREGABALIN 50 MG/1
50 CAPSULE ORAL 3 TIMES DAILY
Status: DISCONTINUED | OUTPATIENT
Start: 2024-08-10 | End: 2024-08-11 | Stop reason: HOSPADM

## 2024-08-10 RX ORDER — PANTOPRAZOLE SODIUM 40 MG/1
40 TABLET, DELAYED RELEASE ORAL DAILY
Status: DISCONTINUED | OUTPATIENT
Start: 2024-08-10 | End: 2024-08-11 | Stop reason: HOSPADM

## 2024-08-10 RX ORDER — CLONIDINE 0.1 MG/24H
1 PATCH, EXTENDED RELEASE TRANSDERMAL WEEKLY
Status: DISCONTINUED | OUTPATIENT
Start: 2024-08-10 | End: 2024-08-11 | Stop reason: HOSPADM

## 2024-08-10 RX ORDER — HYDROMORPHONE HYDROCHLORIDE 2 MG/1
2 TABLET ORAL EVERY 4 HOURS PRN
Status: DISCONTINUED | OUTPATIENT
Start: 2024-08-10 | End: 2024-08-11 | Stop reason: HOSPADM

## 2024-08-10 RX ORDER — HYDRALAZINE HYDROCHLORIDE 20 MG/ML
10 INJECTION INTRAMUSCULAR; INTRAVENOUS EVERY 4 HOURS PRN
Status: DISCONTINUED | OUTPATIENT
Start: 2024-08-10 | End: 2024-08-11 | Stop reason: HOSPADM

## 2024-08-10 RX ADMIN — ACETAMINOPHEN 1000 MG: 500 TABLET, FILM COATED ORAL at 16:36

## 2024-08-10 RX ADMIN — HYDROMORPHONE HYDROCHLORIDE 4 MG: 2 TABLET ORAL at 02:30

## 2024-08-10 RX ADMIN — PREGABALIN 50 MG: 50 CAPSULE ORAL at 16:36

## 2024-08-10 RX ADMIN — HYDROMORPHONE HYDROCHLORIDE 2 MG: 2 TABLET ORAL at 18:23

## 2024-08-10 RX ADMIN — PANTOPRAZOLE SODIUM 40 MG: 40 TABLET, DELAYED RELEASE ORAL at 13:33

## 2024-08-10 RX ADMIN — HYDRALAZINE HYDROCHLORIDE 100 MG: 50 TABLET ORAL at 23:34

## 2024-08-10 RX ADMIN — INSULIN ASPART 4 UNITS: 100 INJECTION, SOLUTION INTRAVENOUS; SUBCUTANEOUS at 06:07

## 2024-08-10 RX ADMIN — ALLOPURINOL 100 MG: 100 TABLET ORAL at 13:32

## 2024-08-10 RX ADMIN — HYDROMORPHONE HYDROCHLORIDE 0.4 MG: 0.2 INJECTION, SOLUTION INTRAMUSCULAR; INTRAVENOUS; SUBCUTANEOUS at 16:37

## 2024-08-10 RX ADMIN — AMLODIPINE BESYLATE 10 MG: 10 TABLET ORAL at 13:32

## 2024-08-10 RX ADMIN — INSULIN ASPART 2 UNITS: 100 INJECTION, SOLUTION INTRAVENOUS; SUBCUTANEOUS at 01:28

## 2024-08-10 RX ADMIN — INSULIN GLARGINE 80 UNITS: 100 INJECTION, SOLUTION SUBCUTANEOUS at 09:06

## 2024-08-10 RX ADMIN — DIPHENHYDRAMINE HYDROCHLORIDE 25 MG: 25 CAPSULE ORAL at 16:36

## 2024-08-10 RX ADMIN — HYDROMORPHONE HYDROCHLORIDE 0.4 MG: 0.2 INJECTION, SOLUTION INTRAMUSCULAR; INTRAVENOUS; SUBCUTANEOUS at 12:17

## 2024-08-10 RX ADMIN — DIPHENHYDRAMINE HYDROCHLORIDE 25 MG: 25 CAPSULE ORAL at 02:30

## 2024-08-10 RX ADMIN — PREGABALIN 50 MG: 50 CAPSULE ORAL at 23:35

## 2024-08-10 RX ADMIN — CLONIDINE 1 PATCH: 0.1 PATCH TRANSDERMAL at 13:40

## 2024-08-10 RX ADMIN — LEVOTHYROXINE SODIUM 50 MCG: 25 TABLET ORAL at 13:32

## 2024-08-10 RX ADMIN — METHOCARBAMOL 1000 MG: 500 TABLET ORAL at 16:36

## 2024-08-10 RX ADMIN — SODIUM CHLORIDE, POTASSIUM CHLORIDE, SODIUM LACTATE AND CALCIUM CHLORIDE: 600; 310; 30; 20 INJECTION, SOLUTION INTRAVENOUS at 01:26

## 2024-08-10 RX ADMIN — HYDROMORPHONE HYDROCHLORIDE 0.4 MG: 0.2 INJECTION, SOLUTION INTRAMUSCULAR; INTRAVENOUS; SUBCUTANEOUS at 09:52

## 2024-08-10 RX ADMIN — ATORVASTATIN CALCIUM 40 MG: 40 TABLET, FILM COATED ORAL at 13:32

## 2024-08-10 RX ADMIN — SODIUM CHLORIDE, POTASSIUM CHLORIDE, SODIUM LACTATE AND CALCIUM CHLORIDE: 600; 310; 30; 20 INJECTION, SOLUTION INTRAVENOUS at 11:16

## 2024-08-10 RX ADMIN — HYDROMORPHONE HYDROCHLORIDE 0.4 MG: 0.2 INJECTION, SOLUTION INTRAMUSCULAR; INTRAVENOUS; SUBCUTANEOUS at 23:35

## 2024-08-10 RX ADMIN — CARVEDILOL 12.5 MG: 12.5 TABLET, FILM COATED ORAL at 16:36

## 2024-08-10 RX ADMIN — ACETAMINOPHEN 1000 MG: 500 TABLET, FILM COATED ORAL at 23:34

## 2024-08-10 RX ADMIN — LOSARTAN POTASSIUM 50 MG: 50 TABLET, FILM COATED ORAL at 13:33

## 2024-08-10 RX ADMIN — BUPROPION HYDROCHLORIDE 300 MG: 300 TABLET, EXTENDED RELEASE ORAL at 18:09

## 2024-08-10 RX ADMIN — DIPHENHYDRAMINE HYDROCHLORIDE 25 MG: 25 CAPSULE ORAL at 09:48

## 2024-08-10 RX ADMIN — METHOCARBAMOL 1000 MG: 500 TABLET ORAL at 23:35

## 2024-08-10 ASSESSMENT — ACTIVITIES OF DAILY LIVING (ADL)
ADLS_ACUITY_SCORE: 38
ADLS_ACUITY_SCORE: 36
ADLS_ACUITY_SCORE: 38
ADLS_ACUITY_SCORE: 36
ADLS_ACUITY_SCORE: 36
ADLS_ACUITY_SCORE: 38

## 2024-08-10 NOTE — PROGRESS NOTES
[facility]    Medicine Progress Note - Hospitalist Service    Date of Admission:  8/9/2024    Assessment & Plan     Mark Milton Traaseth is a 59 year old male with past medical history significant for left lumbar radiculopathy s/p recent L3 spinal surgery 7/10/24 who presents to [facility] with left leg pain and weakness, transferred for further neurosurgery evaluation. Admitted on 8/9/2024.      Left lumbar radiculopathy s/p left L3 decompressive hemilaminotomy, medial facetectomy, lateral recess decompression and microdiscectomy 7/10/24  *Presents to [facility] with low back pain and leg pain after recent fall  *MRI with T1 hypointense enhancing tissue within the left lateral recess at L3-L4 encasing the traversing left L4 nerve root, presumably postoperative changes but superimposed extruded disc material/inflammatory change and infection not excluded  *Afebrile, WBC normal, CRP 0.5 (normal). Generally denies any infectious symptoms  *Reports persistent leg weakness, numbness unchanged following surgery. Reports previous leg pain had resolved with surgery  *Case discussed with neurosurgery prior to transfer    - Neurosurgery consulted  - Neuro checks LLE q4H  - Kept NPO till neurosurgery evaluation, no plan for surgical intervention will start patient on moderate dose carb controlled diet.  - will Follow up on NS recommendation  - will appreciate NS examining his suture site also as one of the sutures has been bothering patient   -Will discontinue IV fluids  - Hydromorphone PO/IV PRN   -Will order Tylenol 1000 mg p.o. 3 times a day.  -Will order Lyrica 50 mg p.o. 3 times daily as ordered, although patient was only taking it once a day.  -Start patient on PTA Robaxin 1000 mg every 6 hours as needed for muscle spasm  -Physical and Occupational Therapy consultation.       Urinary retention   BPH  *Noted to have 500 ml PVR at [facility]  *Patient reports he always struggles  with retention when he is hospitalized and unable to stand to urinate, has otherwise been urinating at his baseline at home     - Monitor post-void residual   - Straight cath PRN  - Patient does not seem to be taking any Flomax at home now . Will continue to monitor      Coronary artery disease with prior NSTEMI s/p MAE to [facility] 5/2021  Hypertension  Hyperlipidemia  Chronic venous insufficiency     - Hold aspirin in event neurosurgical intervention required   - Start patient on amlodipine 10 mg p.o. daily.  -Start patient on atorvastatin 40 mg at bedtime.  -Will start patient on carvedilol 12.5 mg p.o. twice daily with holding parameters  -start patient on clonidine 1 patch 0.1 mg weekly.  -Start patient on hydralazine 100 mg twice daily.  -Start patient on PTA losartan 50 mg p.o. daily.  -Nitroglycerin 0.4 mg every 5 minutes as needed for chest pain  -Hold torsemide today, if doing well and no other surgical intervention is planned then will restart from tomorrow morning.     Chronic kidney disease, stage 2  Baseline creatinine 1.2-1.4. Creatinine 1.29 at OSH.   - Currently around baseline  - Avoid nephrotoxins  - Monitor BMP      Diabetes mellitus, type 2, on long term insulin with diabetic neuropathy   HgbA1c 7.5% 6/14/24. Prior to admission reports taking glargine 80 units in the morning, 20 units in the evening, lispro 4 units per 15 grams of carbohydrates + sliding scale [needs med rec].    - Glargine 80 units qAM  - Aspart 4 unit per 15 grams of carbohydrates  - High sliding scale insulin   - Hold oral medications while hospitalized  - Hypoglycemia protocol  -Resuming Lyrica 50 mg 3 times a day, will monitor as patient was taking it once a day.  Will benefit from dose adjustment due to current back discomfort.     Depression with anxiety  -Start patient on PTA bupropion 300 mg every morning     Obstructive sleep apnea  - Continue CPAP per home settings     GERD  -Start patient on PTA PPI      Hypothyroidism  -Starting patient on PTA levothyroxine 50 mcg daily.    Gout  -Start patient on PTA allopurinol 100 mg daily      Diet: Moderate Consistent Carb (60 g CHO per Meal) Diet    DVT Prophylaxis: Pneumatic Compression Devices and Ambulate every shift  Springer Catheter: Not present  Lines: None     Cardiac Monitoring: None  Code Status: Full Code      Clinically Significant Risk Factors Present on Admission                # Drug Induced Platelet Defect: home medication list includes an antiplatelet medication   # Hypertension: Home medication list includes antihypertensive(s)        # DMII: A1C = 7.1 % (Ref range: <5.7 %) within past 6 months            Disposition Plan     Medically Ready for Discharge: Anticipated in 2-4 Days    Medical Decision Making       50 MINUTES SPENT BY ME on the date of service doing chart review, history, exam, documentation & further activities per the note.  This time was spent in addition to the    Rosa Allen MD  Hospitalist Service  [facility]  Securely message with [employer] (more info)  Text page via [employer] Paging/Directory   ______________________________________________________________________    Interval History     Overnight events noted.  Patient was admitted last night when he presented with left lumbar radiculopathy s/p recent L3 spinal surgery.  He has been taking Lyrica once a day he initially presented to [facility] with left leg pain and weakness and was transferred to [facility].  Awaiting to be evaluated by neurosurgery.  Denying any other chest pain, palpitations or shortness of breath.  Discussed with patient that if neurosurgery is not planning any interventions then will start patient on diet.      Physical Exam   Vital Signs: Temp: 97.2  F (36.2  C) Temp src: Oral BP: (!) 155/79 Pulse: 63   Resp: 18 SpO2: 96 % O2 Device: None (Room air)    Weight: 0 lbs 0 oz    Physical Exam  Vitals and nursing note reviewed.    Constitutional:       Appearance: He is well-developed.   HENT:      Head: Normocephalic and atraumatic.   Eyes:      Pupils: Pupils are equal, round, and reactive to light.   Neck:      Thyroid: No thyromegaly.   Cardiovascular:      Rate and Rhythm: Normal rate and regular rhythm.      Heart sounds: Normal heart sounds.   Pulmonary:      Effort: Pulmonary effort is normal. No respiratory distress.      Breath sounds: Normal breath sounds.   Abdominal:      General: Bowel sounds are normal. There is no distension.      Palpations: Abdomen is soft.   Musculoskeletal:         General: No tenderness. Normal range of motion.      Cervical back: Normal range of motion and neck supple.   Skin:     General: Skin is warm and dry.      Comments: Surgical site has some erythema around sutures, no drainage.   Neurological:      Mental Status: He is alert and oriented to person, place, and time.   Psychiatric:         Behavior: Behavior normal.       Data     I have personally reviewed the following data over the past 24 hrs:    6.2  \   12.7 (L)   / 226     136 103 27.6 (H) /  157 (H)   4.0 24 1.20 (H) \       Imaging results reviewed over the past 24 hrs:   Recent Results (from the past 24 hour(s))   MR Lumbar Spine w/o & w Contrast    Narrative    EXAM: MR LUMBAR SPINE W/WO IV CONT  LOCATION: [facility]  DATE: 8/9/2024    INDICATION: Recent surgery, now cannot move left leg.  COMPARISON: Lumbar spine MRI 5/25/2024.  CONTRAST: Gadobutrol 1 mmol/mL IV soln 10 mL  TECHNIQUE: Routine Lumbar Spine MRI without and with IV contrast.    FINDINGS: Alignment is significant for multilevel subtle grade 1 spondylolisthesis. Bone marrow demonstrates minimal degenerative endplate changes surrounding the L3-4, L4-5, L5-S1 disc joints. No high-grade marrow edema. Left laminectomy changes at L3-4. Right laminectomy changes at L4-5 and L5-S1. Conus medullaris is unremarkable, terminating at the level of the mid L1 vertebral body.  Cauda equina is unremarkable. Presumed benign right renal cysts. Mild bilateral sacroiliac joint degenerative change.    L1-L2: Disc height maintained. No herniation. Normal facet joints. No foraminal or spinal canal stenosis.    L2-L3: Disc height maintained. No herniation. Mild bilateral facet arthropathy. No foraminal or spinal canal stenosis.     L3-L4: Left laminectomy changes. Small volume fluid signal is present at the laminectomy site. Mild disc height loss. Disc bulge with central disc extrusion extending slightly inferiorly within the intradural space possibly abutting the bilateral traversing L4 nerve roots within the lateral recesses (series 9, image 28), decreased in size compared to the prior. Nonspecific T1 hypointense enhancing signal within the left lateral recess encasing the traversing left L4 nerve root (series 10, image 75; series 14, image 87).    L4-L5: Right laminectomy changes. Severe disc height loss. Disc bulge possibly abutting the bilateral traversing L5 nerve roots within the lateral recesses (series 9, image 34). Mild bilateral facet arthropathy. No foraminal stenosis. Mild spinal canal stenosis. Slight asymmetric enhancement surrounding the traversing right L5 nerve root could represent scar tissue (series 15, image 3).    L5-S1: Severe disc height loss. Disc bulge with right central protrusion versus extrusion which may abut the traversing right S1 nerve root within the lateral recess.    IMPRESSION:  1.  Left laminectomy changes at L3-4.  2.  T1 hypointense enhancing tissue within the left lateral recess at L3-4 encasing the traversing left L4 nerve root, presumably postoperative changes, although superimposed extruded disc material/inflammatory change cannot be excluded. Infection not excluded.  3.  Right laminectomy changes at L4-5 and L5-S1.  4.  Other degenerative change and stenoses as detailed.

## 2024-08-10 NOTE — CONSULTS
[facility]    Neurology Consultation    Mark Milton Traaseth MRN# [id number]   YOB: 1965 Age: 59 year old    Code Status:Full Code   Date of Admission: 8/9/2024  Date of Consult:08/10/2024                                                                                       Assessment and Plan:                                         #.  left leg weakness  -- Leg weakness fits with a L4 dermatomal weakness with numbness also fitting that pattern.  I suspect most of his weakness is secondary to his atrophy noted on exam that likely occurred from before surgery L4 injury.  Encouraged physical therapy to work on improvement of the left leg strength.  However, in addition he does have eversion weakness and reportedly some foot drop on occasion, inversion seems relatively strong.  Other areas of numbness could fit with a peroneal neuropathy versus L5 radiculopathy.  MRI did show slight enhancement surrounding the right L5 nerve root which could represent scar tissue, no significant abnormality noted the left L5 nerve root.   No significant dorsiflexion weakness on exam.  With his knee pain will want to MRI his knee as well as evaluate his peroneal nerve.  Would recommend EMG in the outpatient setting.  -MRI knee without contrast to evaluate for any peroneal nerve compression and due to his complaints of knee pain  -EMG of the left leg, unable to done over the weekend, could be done in the outpatient setting  -PT while admitted      ----------------------------------------------------------------------------------  ----------------------------------------------------------------------------------  Reason for consult: as above       Chief Complaint:   Weakness           History of Present Illness:   This patient is a 59 year old male who presents with weakness of his left leg.     Patient presented to hospital with low back pain and leg pain after recent fall.  He has history of a left  lumbar radiculopathy s/p left L3 decompressive hemilaminectomy, medial fasciectomy, lateral recess decompression and microdiscectomy on 7/10/2024.  MRI of the lower spine showed T1 hypointense enhancing tissue within the left lateral recess at L3-4 encasing the left L4 nerve with question of postoperative changes versus superimposed disc material/inflammatory changes and infection considered.  Also seems to have slight asymmetric enhancement around the L5 nerve root on the right that could represent scar tissue.  He has been afebrile with normal CRP and white count.  Reports persistent leg weakness and numbness unchanged following the surgery.  However pain is improved since surgery.  Neurosurgery evaluate did not find an indication for surgery.  He had further imaging of his thoracic spine which on personal review was unremarkable.  His knee showed some patellofemoral compartment degenerative changes on prior xray from day prior, but no fractures or acute joint effusion.    Patient reports he feels the symptoms and pain into his left thigh is about the same as it was before just after the surgery.  He reports more of back pain following the fall.  Thinks his numbness is about the same but he and his wife have difficulty noting what has significantly worsened since the surgery or been about the same following the surgery.  She thinks he has had occasional dropping of his foot on the left but he thinks maybe that could have occurred before surgery as well.  Denies any crossing of his legs.  No significant weight loss.  They have yet to do any physical therapy since the surgery a month ago.    MEDICAL DOCUMENTATION REVIEWED: H&P and ED           Past Medical History:     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     ASCVD (arteriosclerotic cardiovascular disease)     Attention deficit hyperactivity disorder (ADHD), predominantly inattentive type, in  remission     Cellulitis of right lower extremity     Decreased sex drive     Depression with anxiety     Diabetic macular edema of both eyes (H)     Essential hypertension     Gout     Hepatic steatosis     History of basal cell carcinoma of skin: face     Irritable bowel syndrome without diarrhea     Mixed hyperlipidemia     Motion sickness     JANAK (obstructive sleep apnea)     Personal history of malignant melanoma of skin     Stented coronary artery     Type 2 diabetes mellitus with diabetic leg ulcer (H)     Unilateral primary osteoarthritis, right hip          Past Surgical History:     Past Surgical History:   Procedure Laterality Date    5TH RAY RESECTION, APPLICATION OF ANTIBIOTIC Left 03/04/2020    APPENDECTOMY  11/16/2004    ARTHROSCOPIC SHOULDER DECOMPRESSION Right 08/20/2010    BACK SURGERY      CHONDRECTOMY OF SPINE: DISCECTOMY  09/15/2009    DECOMPRESSION LUMBAR MINIMALLY INVASIVE ONE LEVEL Left 7/10/2024    Procedure: LEFT LUMBAR 3, LUMBAR 4 LATERAL RECESS DECOMPRESSION AND MICRODISCECTOMY;  Surgeon: Dom Price MD;  Location: [facility] OR    EYE LASER TREATMENT  2014    IR LUMBAR/SACRAL TRANSFOR INJ BILATERAL Bilateral 05/28/2024    IRRIGATION AND DEBRIDEMENT LOWER EXTREMITY WITH EXCISION OSTEOMYELITIC BONE FOR BIOPSY WITH CULTURES  Right 12/02/2021    LAMINOTOMY: W/DCMPRSN NRV ROOT 1 INTRSPC LUMBR  09/15/2009    LAPAROSCOPIC CHOLECYSTECTOMY      METATARSAL RAY 2ND TOE AMPUTATION Right 05/30/2019    PHOTOCOAGULATION OF THE EYE Left     3/8/2011, 6/27/2011, 10/18/2011    PHOTOCOAGULATION OF THE EYE Left 05/25/2012    PHOTOCOAGULATION OF THE EYE Left 03/18/2013    PHOTOCOAGULATION OF THE EYES Bilateral 11/07/2012    SESAMOIDECTOMY: GREAT TOE DISTAL PHALANX SESAMOID EXCISION & TOE HALLUX AMPUTATION Right 06/21/2018    TOE AMPUTATION: GREAT TOE Right 08/09/2018    TRANSMETATARSAL AMPUTATION OF FOOT Right 09/08/2021    VASECTOMY            Social History:     Social History     Socioeconomic History     Marital status:    Tobacco Use    Smoking status: Never    Smokeless tobacco: Former     Types: Chew   Substance and Sexual Activity    Alcohol use: Yes     Comment: 3 drinks/week    Drug use: Not Currently     Social Determinants of Health     Financial Resource Strain: Low Risk  (6/7/2019)    Received from [facility]    Overall Financial Resource Strain (CARDIA)     Difficulty of Paying Living Expenses: Not very hard   Food Insecurity: No Food Insecurity (7/31/2023)    [facility]    Hunger Vital Sign     Worried About Running Out of Food in the Last Year: Never true     Ran Out of Food in the Last Year: Never true   Transportation Needs: No Transportation Needs (6/7/2019)    Received from [facility]    PRAPARE - Transportation     Lack of Transportation (Medical): No     Lack of Transportation (Non-Medical): No   Physical Activity: Inactive (6/7/2019)    Received from [facility]    Exercise Vital Sign     Days of Exercise per Week: 0 days     Minutes of Exercise per Session: 0 min   Stress: Stress Concern Present (7/22/2019)    Received from [facility]    Citizen of [place] of Occupational Health - Occupational Stress Questionnaire     Feeling of Stress : To some extent   Social Connections: Socially Integrated (7/22/2019)    Received from [facility]    Social Connection and Isolation Panel [NHANES]     Frequency of Communication with Friends and Family: Twice a week     Frequency of Social Gatherings with Friends and Family: Once a week     Attends [employer] Services: More than 4 times per year     Active Member of Clubs or Organizations: Yes     Attends Club or Organization Meetings: More than 4 times per year     Marital Status:    Interpersonal Safety: Unknown (8/9/2024)    [facility]    Humiliation, Afraid, Rape, and Kick questionnaire     Emotionally Abused: No     Physically Abused: No     Sexually Abused: No         Family History:   No family history  on file.         Home Medications:     Prior to Admission Medications   Prescriptions Last Dose Informant Patient Reported? Taking?   CHERRY CONCENTRATE PO 8/9/2024 at am  Yes Yes   Sig: Take 2 tablets by mouth 2 times daily   CHROMIUM PICOLINATE PO 8/9/2024 at am  Yes Yes   Sig: Take 1 tablet by mouth 2 times daily (before meals)   L-GLUTAMINE PO 8/9/2024 at am  Yes Yes   Sig: Take 5 g by mouth 2 times daily   Levomefolic Acid (5-MTHF) 1 MG CAPS 8/9/2024 at am  Yes Yes   Sig: Take 1-7 mg by mouth daily   MAGNESIUM GLYCINATE PO 8/9/2024 at am  Yes Yes   Sig: Take 2 capsules by mouth 2 times daily   acetaminophen (TYLENOL) 500 MG tablet  at PRN  No Yes   Sig: Take 2 tablets (1,000 mg) by mouth 3 times daily   Patient taking differently: Take 1,000 mg by mouth 3 times daily as needed   allopurinol (ZYLOPRIM) 100 MG tablet  at PRN  Yes Yes   Sig: Take 100 mg by mouth daily as needed (elevated purines)   amLODIPine (NORVASC) 10 MG tablet 8/9/2024 at am  Yes Yes   Sig: Take 10 mg by mouth daily   aspirin 81 MG EC tablet 8/9/2024 at am  Yes Yes   Sig: Take 1 tablet (81 mg) by mouth daily   atorvastatin (LIPITOR) 40 MG tablet 8/9/2024 at am  Yes Yes   Sig: Take 40 mg by mouth daily   buPROPion (WELLBUTRIN XL) 300 MG 24 hr tablet 8/9/2024 at am  Yes Yes   Sig: Take 300 mg by mouth every morning   carvedilol (COREG) 25 MG tablet 8/9/2024 at am  No Yes   Sig: Take 1 tablet (25 mg) by mouth 2 times daily (with meals)   Patient taking differently: Take 12.5 mg by mouth 2 times daily (with meals)   cholecalciferol (VITAMIN D3) 1250 mcg (59003 units) capsule 8/2/2024  Yes Yes   Sig: Take 1,250 mcg by mouth every 7 days Fridays   cloNIDine (CATAPRES-TTS1) 0.1 MG/24HR WK patch 8/2/2024 at removed 8/9  Yes Yes   Sig: Place 1 patch onto the skin once a week Fridays   hydrALAZINE (APRESOLINE) 100 MG tablet 8/9/2024 at am  No Yes   Sig: Take 1 tablet (100 mg) by mouth every 8 hours   Patient taking differently: Take 100 mg by mouth 2  times daily   hydrOXYzine HCl (ATARAX) 10 MG tablet  at PRN  No Yes   Sig: Take 1-2 tablets (10-20 mg) by mouth 2 times daily as needed for itching or anxiety   insulin glargine (LANTUS PEN) 100 UNIT/ML pen   Yes Yes   Sig: Inject 20 Units subcutaneously at bedtime   insulin glargine (LANTUS PEN) 100 UNIT/ML pen  at am x 80 units  No Yes   Sig: Inject 15 Units Subcutaneous every morning   Patient taking differently: Inject 80 Units subcutaneously every morning   insulin lispro (HUMALOG KWIKPEN) 100 UNIT/ML (1 unit dial) KWIKPEN 8/9/2024 at am  Yes Yes   Sig: Inject 0-12 Units subcutaneously 3 times daily (before meals) Uses 4 units/15g carbohydrates. Correction dose: 4units per 25 over 100mg/dL   levothyroxine (SYNTHROID/LEVOTHROID) 50 MCG tablet 8/9/2024 at am  Yes Yes   Sig: Take 50 mcg by mouth daily   losartan (COZAAR) 50 MG tablet 8/9/2024 at am  Yes Yes   Sig: Take 50 mg by mouth daily   methocarbamol (ROBAXIN) 500 MG tablet  at PRN  No Yes   Sig: Take 2 tablets (1,000 mg) by mouth every 6 hours as needed for muscle spasms   nitroGLYcerin (NITROSTAT) 0.4 MG sublingual tablet  at PRN  Yes Yes   Sig: Place 0.4 mg under the tongue every 5 minutes as needed for chest pain For chest pain place 1 tablet under the tongue every 5 minutes for 3 doses. If symptoms persist 5 minutes after 1st dose call 911.   omeprazole (PRILOSEC) 20 MG DR capsule 8/9/2024 at am  Yes Yes   Sig: Take 20 mg by mouth daily   pregabalin (LYRICA) 50 MG capsule 8/9/2024 at am  No Yes   Sig: Take 1 capsule (50 mg) by mouth 3 times daily   Patient taking differently: Take 50 mg by mouth daily   pyridOXINE (VITAMIN B-6) 50 MG tablet 8/9/2024 at am  Yes Yes   Sig: Take 50 mg by mouth daily   red yeast rice 600 MG CAPS 8/9/2024  Yes Yes   Sig: Take 600 mg by mouth 2 times daily   senna-docusate (SENOKOT-S/PERICOLACE) 8.6-50 MG tablet  at PRN  No Yes   Sig: Take 1 tablet by mouth 2 times daily as needed for constipation   sildenafil (REVATIO) 20 MG  tablet  at PRN  Yes Yes   Sig: Take 100 mg by mouth daily as needed   tirzepatide (MOUNJARO) 10 MG/0.5ML pen 8/2/2024  Yes Yes   Sig: Inject 10 mg subcutaneously every 7 days Fridays   torsemide (DEMADEX) 20 MG tablet 8/9/2024 at am  Yes Yes   Sig: Take 40 mg by mouth daily   valACYclovir (VALTREX) 1000 mg tablet  at PRN  Yes Yes   Sig: Take 1,000 mg by mouth 2 times daily as needed X 1 day for cold sores      Facility-Administered Medications: None          Allergy:     Allergies   Allergen Reactions    Vancomycin Itching     riddle    Codeine Itching     Can take with benadryl    Morphine Itching     Can take with benadryl          Inpatient Medications:   Scheduled Meds:  Current Facility-Administered Medications   Medication Dose Route Frequency Provider Last Rate Last Admin    acetaminophen (TYLENOL) tablet 1,000 mg  1,000 mg Oral TID Rosa Allen MD        allopurinol (ZYLOPRIM) tablet 100 mg  100 mg Oral Daily Rosa Allen MD        amLODIPine (NORVASC) tablet 10 mg  10 mg Oral Daily Rosa Allen MD        atorvastatin (LIPITOR) tablet 40 mg  40 mg Oral Daily Rosa Allen MD        buPROPion (WELLBUTRIN XL) 24 hr tablet 300 mg  300 mg Oral QAM Rosa Allen MD        carvedilol (COREG) tablet 12.5 mg  12.5 mg Oral BID w/meals Rosa Allen MD        cloNIDine (CATAPRES-TTS1) 0.1 MG/24HR WK patch 1 patch  1 patch Transdermal Weekly Rosa Allen MD        And    cloNIDine (CATAPRES-TTS1) Patch in Place   Transdermal Q8H [facility] Rosa Allen MD        hydrALAZINE (APRESOLINE) tablet 100 mg  100 mg Oral BID Rosa Allen MD        insulin aspart (NovoLOG) injection (RAPID ACTING)   Subcutaneous TID w/meals All Connell MD        insulin aspart (NovoLOG) injection (RAPID ACTING)  1-7 Units Subcutaneous TID AC Rosa Allen MD        insulin aspart (NovoLOG) injection (RAPID ACTING)  1-5 Units Subcutaneous At Bedtime Rosa Allen MD        insulin glargine (LANTUS PEN) injection 80 Units  80 Units  Subcutaneous QAM AC All Connell MD   80 Units at 08/10/24 0906    levothyroxine (SYNTHROID/LEVOTHROID) tablet 50 mcg  50 mcg Oral Daily Rosa Allen MD        losartan (COZAAR) tablet 50 mg  50 mg Oral Daily Rosa Allen MD        pantoprazole (PROTONIX) EC tablet 40 mg  40 mg Oral Daily Rosa lAlen MD        pregabalin (LYRICA) capsule 50 mg  50 mg Oral TID Rosa Allen MD        sodium chloride (PF) 0.9% PF flush 3 mL  3 mL Intracatheter Q8H All Connell MD   3 mL at 08/10/24 0953     PRN Meds:   Current Facility-Administered Medications   Medication Dose Route Frequency Provider Last Rate Last Admin    acetaminophen (TYLENOL) tablet 650 mg  650 mg Oral Q4H PRAll Baker MD        Or    acetaminophen (TYLENOL) Suppository 650 mg  650 mg Rectal Q4H PRN All Connell MD        benzocaine-menthol (CHLORASEPTIC) 6-10 MG lozenge 1 lozenge  1 lozenge Buccal Q1H PRN All Connell MD        glucose gel 15-30 g  15-30 g Oral Q15 Min PRAll Baker MD        Or    dextrose 50 % injection 25-50 mL  25-50 mL Intravenous Q15 Min PRAll Baker MD        Or    glucagon injection 1 mg  1 mg Subcutaneous Q15 Min All Shore MD        diphenhydrAMINE (BENADRYL) capsule 25 mg  25 mg Oral Q6H PRN All Connell MD   25 mg at 08/10/24 0948    hydrALAZINE (APRESOLINE) tablet 10 mg  10 mg Oral Q4H PRAll Baker MD        Or    hydrALAZINE (APRESOLINE) injection 10 mg  10 mg Intravenous Q4H All Shore MD        HYDROmorphone (DILAUDID) injection 0.2 mg  0.2 mg Intravenous Q2H PRAll Baker MD        HYDROmorphone (DILAUDID) injection 0.4 mg  0.4 mg Intravenous Q2H PRN All Connell MD   0.4 mg at 08/10/24 1217    HYDROmorphone (DILAUDID) tablet 2 mg  2 mg Oral Q4H PRN All Connell MD        HYDROmorphone (DILAUDID) tablet 4 mg  4 mg Oral Q4H PRN All Connell MD   4  "mg at 08/10/24 0230    hydrOXYzine HCl (ATARAX) tablet 25 mg  25 mg Oral BID PRN Rosa Allen MD        lidocaine (LMX4) cream   Topical Q1H PRN All Connell MD        lidocaine 1 % 0.1-1 mL  0.1-1 mL Other Q1H PRN All Connell MD        methocarbamol (ROBAXIN) tablet 1,000 mg  1,000 mg Oral Q6H PRN Rosa Allen MD        naloxone (NARCAN) injection 0.2 mg  0.2 mg Intravenous Q2 Min PRN All Connell MD        Or    naloxone (NARCAN) injection 0.4 mg  0.4 mg Intravenous Q2 Min PRAll Baker MD        Or    naloxone (NARCAN) injection 0.2 mg  0.2 mg Intramuscular Q2 Min PRAll Baker MD        Or    naloxone (NARCAN) injection 0.4 mg  0.4 mg Intramuscular Q2 Min PRAll Baker MD        nitroGLYcerin (NITROSTAT) sublingual tablet 0.4 mg  0.4 mg Sublingual Q5 Min PRN Rosa Allen MD        ondansetron (ZOFRAN ODT) ODT tab 4 mg  4 mg Oral Q6H PRAll Baker MD        Or    ondansetron (ZOFRAN) injection 4 mg  4 mg Intravenous Q6H PRAll Baker MD        polyethylene glycol (MIRALAX) Packet 17 g  17 g Oral BID PRN All Connell MD        prochlorperazine (COMPAZINE) injection 10 mg  10 mg Intravenous Q6H PRAll Baker MD        Or    prochlorperazine (COMPAZINE) tablet 10 mg  10 mg Oral Q6H PRAll Baker MD        Or    prochlorperazine (COMPAZINE) suppository 25 mg  25 mg Rectal Q12H PRAll Baker MD        sendemetrius-docusate (SENOKOT-S/PERICOLACE) 8.6-50 MG per tablet 1 tablet  1 tablet Oral BID PRAll Baker MD        Or    senna-docusate (SENOKOT-S/PERICOLACE) 8.6-50 MG per tablet 2 tablet  2 tablet Oral BID PRN All Connell MD        sodium chloride (PF) 0.9% PF flush 3 mL  3 mL Intracatheter q1 min prn All Connell MD                 Physical Exam:   Physical Exam   Vitals:  Height:6' 2\"  Weight:292 lbs 1.75 oz   Temp: 97.2  F (36.2  C) Temp " src: Oral BP: (!) 155/79 Pulse: 63   Resp: 18 SpO2: 96 % O2 Device: None (Room air)    General Appearance: No acute distress, normal body habitus  Neuro Exam:  Mental Status Exam: Alert and oriented. Language and speech intact. Fund of knowledge normal.  Cranial Nerves: Extraocular movements intact. Facial strength is normal. No jaw or tongue deviation.  Motor: Upper extremities full strength and intact tone.  In the right  he has full strength to confrontational testing with no atrophy noted.  In the left  lower extremity he does have atrophy of his quadriceps with 4/5 hip flexion, 4/5 knee extension, 4/5 knee flexion, 5-/5 dorsiflexion, 4-/5 eversion, 5/5 inversion  Reflexes: 0/4 left patella and bilateral Achilles reflexes.  1/4 right patella.  Plantar signs normal.  Sensory: Decreased pinprick along the left thigh and knee, lateral calf and dorsum of the foot.  Decreased pinprick bilaterally in the plantar surface of his foot and toes.   Extremities: No clubbing, no cyanosis, no edema          Data:   ROUTINE IP LABS   CBC RESULTS:     Recent Labs   Lab 08/10/24  0832   WBC 6.2   RBC 4.26*   HGB 12.7*   HCT 37.0*        Basic Metabolic Panel:   Recent Labs   Lab Test 08/10/24  0953 08/10/24  0836 08/10/24  0832 07/11/24  1144 07/11/24  0934 05/30/24  0700 05/30/24  0616 05/29/24  0631 05/29/24  0614   NA  --   --  136  --   --   --  139  --  137   POTASSIUM  --   --  4.0  --   --   --  4.6  --  5.1   CHLORIDE  --   --  103  --   --   --  107  --  106   CO2  --   --  24  --   --   --  21*  --  21*   BUN  --   --  27.6*  --   --   --  48.0*  --  46.6*   CR  --   --  1.20*  --  1.10  --  1.27*  --  1.24*   * 161* 170*   < >  --    < > 58*   < > 197*   BELA  --   --  8.8  --   --   --  9.3  --  9.4    < > = values in this interval not displayed.     Liver panel:  No lab results found.  Lipid Profile:No lab results found.  Thyroid Panel:No lab results found.   Vitamin B12: No lab results found.         IMAGING:   Independent interpretation of the following studies by myself as part of today's encounter.   MRI lumbar with without contrast  FINDINGS: Alignment is significant for multilevel subtle grade 1 spondylolisthesis. Bone marrow demonstrates minimal degenerative endplate changes surrounding the L3-4, L4-5, L5-S1 disc joints. No high-grade marrow edema. Left laminectomy changes at L3-4. Right laminectomy changes at L4-5 and L5-S1. Conus medullaris is unremarkable, terminating at the level of the mid L1 vertebral body. Cauda equina is unremarkable. Presumed benign right renal cysts. Mild bilateral sacroiliac joint degenerative change.     L1-L2: Disc height maintained. No herniation. Normal facet joints. No foraminal or spinal canal stenosis.     L2-L3: Disc height maintained. No herniation. Mild bilateral facet arthropathy. No foraminal or spinal canal stenosis.     L3-L4: Left laminectomy changes. Small volume fluid signal is present at the laminectomy site. Mild disc height loss. Disc bulge with central disc extrusion extending slightly inferiorly within the intradural space possibly abutting the bilateral traversing L4 nerve roots within the lateral recesses (series 9, image 28), decreased in size compared to the prior. Nonspecific T1 hypointense enhancing signal within the left lateral recess encasing the traversing left L4 nerve root (series 10, image 75; series 14, image 87).     L4-L5: Right laminectomy changes. Severe disc height loss. Disc bulge possibly abutting the bilateral traversing L5 nerve roots within the lateral recesses (series 9, image 34). Mild bilateral facet arthropathy. No foraminal stenosis. Mild spinal canal stenosis. Slight asymmetric enhancement surrounding the traversing right L5 nerve root could represent scar tissue (series 15, image 3).     L5-S1: Severe disc height loss. Disc bulge with right central protrusion versus extrusion which may abut the traversing right S1 nerve root  within the lateral recess.     IMPRESSION:   1. Left laminectomy changes at L3-4.   2.  T1 hypointense enhancing tissue within the left lateral recess at L3-4 encasing the traversing left L4 nerve root, presumably postoperative changes, although superimposed extruded disc material/inflammatory change cannot be excluded. Infection not excluded.   3.  Right laminectomy changes at L4-5 and L5-S1.   4.  Other degenerative change and stenoses as detailed.     Time:  80 minutes evaluation and management.     Mar Vo M.D.  [facility]  Office [phone]

## 2024-08-10 NOTE — PHARMACY-ADMISSION MEDICATION HISTORY
Pharmacy Intern Admission Medication History    Admission medication history is complete. The information provided in this note is only as accurate as the sources available at the time of the update.    Information Source(s): Patient, Family member, and CareEverywhere/SureScripts via in-person    Pertinent Information: Wife plans to bring in Mounjaro and Dexcom, pt prefers to continue using Mounjaro while hospitalized. Okay with using insulin as ordered during stay but would like to return to PTA insulin dose once discharged, since blood glucose has been well managed previously    Changes made to PTA medication list:  Added: atorvastatin, vitamin D3  Deleted: no longer on tamsulosin, concerta, acetic acid  Changed: carvedilol 25mg --> 12.5mg BID (caused low HR, okayed by PCP), lantus 15 units QAM + 100 units QPM --> 80 units QAM + 20 units QPM, hydralazine TID --> BID, pregabalin TID prn --> QD    Allergies reviewed with patient and updates made in EHR: yes    Medication History Completed By: COSTA MCCORMICK 8/10/2024 10:58 AM    PTA Med List   Medication Sig Note Last Dose    acetaminophen (TYLENOL) 500 MG tablet Take 2 tablets (1,000 mg) by mouth 3 times daily (Patient taking differently: Take 1,000 mg by mouth 3 times daily as needed)   at PRN    allopurinol (ZYLOPRIM) 100 MG tablet Take 100 mg by mouth daily as needed (elevated purines)   at PRN    amLODIPine (NORVASC) 10 MG tablet Take 10 mg by mouth daily  8/9/2024 at am    aspirin 81 MG EC tablet Take 1 tablet (81 mg) by mouth daily  8/9/2024 at am    atorvastatin (LIPITOR) 40 MG tablet Take 40 mg by mouth daily  8/9/2024 at am    buPROPion (WELLBUTRIN XL) 300 MG 24 hr tablet Take 300 mg by mouth every morning  8/9/2024 at am    carvedilol (COREG) 25 MG tablet Take 1 tablet (25 mg) by mouth 2 times daily (with meals) (Patient taking differently: Take 12.5 mg by mouth 2 times daily (with meals))  8/9/2024 at am    CHERRY CONCENTRATE PO Take 2 tablets by mouth 2  times daily  8/9/2024 at am    cholecalciferol (VITAMIN D3) 1250 mcg (08087 units) capsule Take 1,250 mcg by mouth every 7 days Fridays 8/2/2024    CHROMIUM PICOLINATE PO Take 1 tablet by mouth 2 times daily (before meals)  8/9/2024 at am    cloNIDine (CATAPRES-TTS1) 0.1 MG/24HR WK patch Place 1 patch onto the skin once a week Fridays 8/2/2024 at removed 8/9    hydrALAZINE (APRESOLINE) 100 MG tablet Take 1 tablet (100 mg) by mouth every 8 hours (Patient taking differently: Take 100 mg by mouth 2 times daily)  8/9/2024 at am    hydrOXYzine HCl (ATARAX) 10 MG tablet Take 1-2 tablets (10-20 mg) by mouth 2 times daily as needed for itching or anxiety   at PRN    insulin glargine (LANTUS PEN) 100 UNIT/ML pen Inject 15 Units Subcutaneous every morning (Patient taking differently: Inject 80 Units subcutaneously every morning)   at am x 80 units    insulin glargine (LANTUS PEN) 100 UNIT/ML pen Inject 20 Units subcutaneously at bedtime      insulin lispro (HUMALOG KWIKPEN) 100 UNIT/ML (1 unit dial) KWIKPEN Inject 0-12 Units subcutaneously 3 times daily (before meals) Uses 4 units/15g carbohydrates. Correction dose: 4units per 25 over 100mg/dL  8/9/2024 at am    L-GLUTAMINE PO Take 5 g by mouth 2 times daily  8/9/2024 at am    Levomefolic Acid (5-MTHF) 1 MG CAPS Take 1-7 mg by mouth daily  8/9/2024 at am    levothyroxine (SYNTHROID/LEVOTHROID) 50 MCG tablet Take 50 mcg by mouth daily  8/9/2024 at am    losartan (COZAAR) 50 MG tablet Take 50 mg by mouth daily  8/9/2024 at am    MAGNESIUM GLYCINATE PO Take 2 capsules by mouth 2 times daily  8/9/2024 at am    methocarbamol (ROBAXIN) 500 MG tablet Take 2 tablets (1,000 mg) by mouth every 6 hours as needed for muscle spasms   at PRN    nitroGLYcerin (NITROSTAT) 0.4 MG sublingual tablet Place 0.4 mg under the tongue every 5 minutes as needed for chest pain For chest pain place 1 tablet under the tongue every 5 minutes for 3 doses. If symptoms persist 5 minutes after 1st dose call  911.   at PRN    omeprazole (PRILOSEC) 20 MG DR capsule Take 20 mg by mouth daily  8/9/2024 at am    pregabalin (LYRICA) 50 MG capsule Take 1 capsule (50 mg) by mouth 3 times daily (Patient taking differently: Take 50 mg by mouth daily)  8/9/2024 at am    pyridOXINE (VITAMIN B-6) 50 MG tablet Take 50 mg by mouth daily  8/9/2024 at am    red yeast rice 600 MG CAPS Take 600 mg by mouth 2 times daily  8/9/2024    senna-docusate (SENOKOT-S/PERICOLACE) 8.6-50 MG tablet Take 1 tablet by mouth 2 times daily as needed for constipation   at PRN    sildenafil (REVATIO) 20 MG tablet Take 100 mg by mouth daily as needed   at PRN    tirzepatide (MOUNJARO) 10 MG/0.5ML pen Inject 10 mg subcutaneously every 7 days Fridays 8/10/2024: Wife plans to bring in home supply 8/2/2024    torsemide (DEMADEX) 20 MG tablet Take 40 mg by mouth daily  8/9/2024 at am    valACYclovir (VALTREX) 1000 mg tablet Take 1,000 mg by mouth 2 times daily as needed X 1 day for cold sores   at PRN

## 2024-08-10 NOTE — PLAN OF CARE
Goal Outcome Evaluation:  Pt transferred from [facility] for back and left leg pain.  Alert and oriented X4. Assist of one with walker and gait belt. NPO since 0100,  Numbness and tingling on LLE. Pt uses Urimar at bedside with adequate output, pvr 295 at  0328. Pain managed with prn po dilauded. DMT2 and blood sugar check Q4 hours.

## 2024-08-10 NOTE — H&P
[facility]    History and Physical - Hospitalist Service       Date of Admission:  8/9/2024    Assessment & Plan   Mark Milton Traaseth is a 59 year old male with past medical history significant for left lumbar radiculopathy s/p recent L3 spinal surgery 7/10/24 who presents to [facility] with left leg pain and weakness, transferred for further neurosurgery evaluation. Admitted on 8/9/2024.     Left lumbar radiculopathy s/p left L3 decompressive hemilaminotomy, medial facetectomy, lateral recess decompression and microdiscectomy 7/10/24  *Presents to [facility] with low back pain and leg pain after recent fall  *MRI with T1 hypointense enhancing tissue within the left lateral recess at L3-L4 encasing the traversing left L4 nerve root, presumably postoperative changes but superimposed extruded disc material/inflammatory change and infection not excluded  *Afebrile, WBC normal, CRP 0.5 (normal). Generally denies any infectious symptoms  *Reports persistent leg weakness, numbness unchanged following surgery. Reports previous leg pain had resolved with surgery  *Case discussed with neurosurgery prior to transfer  - Neurosurgery consulted  - Neuro checks LLE q4H  - NPO until seen by neurosurgery   - Hydromorphone PO/IV PRN     Urinary retention   BPH  *Noted to have 500 ml PVR at [facility]  *Patient reports he always struggles with retention when he is hospitalized and unable to stand to urinate, has otherwise been urinating at his baseline at home   - Monitor post-void residual   - Straight cath PRN  - Resume prior to admission tamsulosin when dose confirmed by pharmacy     Coronary artery disease with prior NSTEMI s/p MAE to mLAD 5/2021  Hypertension  Hyperlipidemia  Chronic venous insufficiency   - Hold aspirin in event neurosurgical intervention required   - Resume prior to admission amlodipine, hydralazine, clonidine patch, torsemide when dose confirmed by pharmacy   -  Resume prior to admission atorvastatin when dose confirmed by pharmacy     Chronic kidney disease, stage 2  Baseline creatinine 1.2-1.4. Creatinine 1.29 at OSH.   - Currently around baseline  - Avoid nephrotoxins  - Monitor BMP     Diabetes mellitus, type 2, on long term insulin with diabetic neuropathy   HgbA1c 7.5% 6/14/24. Prior to admission reports taking glargine 80 units in the morning, 20 units in the evening, lispro 4 units per 15 grams of carbohydrates + sliding scale [needs med rec].  - Glargine 80 units qAM  - Aspart 4 unit per 15 grams of carbohydrates  - High sliding scale insulin   - Hold oral medications while hospitalized  - Hypoglycemia protocol  - Resume prior to admission pregabalin when dose confirmed by pharmacy     Depression with anxiety  - Resume prior to admission bupropion when dose confirmed by pharmacy     Obstructive sleep apnea  - Continue CPAP per home settings    GERD  - Resume prior to admission PPI when dose confirmed by pharmacy     Hypothyroidism  - Resume prior to admission levothyroxine when dose confirmed by pharmacy        Diet: NPO per Anesthesia Guidelines for Procedure/Surgery Except for: Meds   DVT Prophylaxis: Pneumatic Compression Devices  Springer Catheter: Not present  Code Status: Full Code     Disposition Plan   Admit to inpatient. Anticipate greater than or equal to 2 midnights prior to discharge.      Entered: All Connell MD 08/10/2024, 3:15 AM     The patient's care was discussed with the patient and bedside RN      Clinically Significant Risk Factors Present on Admission                # Drug Induced Platelet Defect: home medication list includes an antiplatelet medication   # Hypertension: Home medication list includes antihypertensive(s)        # DMII: A1C = 7.1 % (Ref range: <5.7 %) within past 6 months                          All Connell MD  [facility]    ______________________________________________________________________    Chief Complaint   Left leg pain, weakness, fall     History of Present Illness   Mark Milton Traaseth is a 59 year old male who presents with the above chief complaint.    History is obtained from the patient and review of medical record. The patient underwent spinal surgery as detailed in assessment and plan above on 7/10. He reports since surgery, his left leg pain had improved, however he continued to have the same weakness and numbness symptoms he had prior to surgery. He has been ambulating with a walker since surgery and his left leg has periodically given out on him. On 8/7 he sustained a fall walking down a step at his home when his leg gave out, landing on his left side. Since then he has had increased pain in his lower back, though did not specifically fall or land on his back. Since then he also reports increased pain in his left leg radiating into his left inner thigh to his knee. He otherwise feels his weakness is at baseline. He reports two episodes of stool incontinence over the past week. He has had some urinary incontinence due to not being able to get to the bathroom in time, though otherwise reports urinating normally and feeling as he is emptying his bladder. He reports whenever he is hospitalized he has difficulty emptying his bladder as he is not able to urinary unless he can stand. He denies any fevers or chills. He has been managing his pain with as needed hydromorphone at home, which he uses sparingly. He deneis any chest pain or shortness of breath.     Past Medical History    I have reviewed this patient's medical history and updated it with pertinent information if needed.   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     ASCVD (arteriosclerotic cardiovascular disease)     Attention deficit hyperactivity disorder (ADHD), predominantly  inattentive type, in remission     Cellulitis of right lower extremity     Decreased sex drive     Depression with anxiety     Diabetic macular edema of both eyes (H)     Essential hypertension     Gout     Hepatic steatosis     History of basal cell carcinoma of skin: face     Irritable bowel syndrome without diarrhea     Mixed hyperlipidemia     Motion sickness     JANAK (obstructive sleep apnea)     Personal history of malignant melanoma of skin     Stented coronary artery     Type 2 diabetes mellitus with diabetic leg ulcer (H)     Unilateral primary osteoarthritis, right hip        Past Surgical History   I have reviewed this patient's surgical history and updated it with pertinent information if needed.  Past Surgical History:   Procedure Laterality Date    5TH RAY RESECTION, APPLICATION OF ANTIBIOTIC Left 03/04/2020    APPENDECTOMY  11/16/2004    ARTHROSCOPIC SHOULDER DECOMPRESSION Right 08/20/2010    BACK SURGERY      CHONDRECTOMY OF SPINE: DISCECTOMY  09/15/2009    DECOMPRESSION LUMBAR MINIMALLY INVASIVE ONE LEVEL Left 7/10/2024    Procedure: LEFT LUMBAR 3, LUMBAR 4 LATERAL RECESS DECOMPRESSION AND MICRODISCECTOMY;  Surgeon: Dom Price MD;  Location: [facility] OR    EYE LASER TREATMENT  2014    IR LUMBAR/SACRAL TRANSFOR INJ BILATERAL Bilateral 05/28/2024    IRRIGATION AND DEBRIDEMENT LOWER EXTREMITY WITH EXCISION OSTEOMYELITIC BONE FOR BIOPSY WITH CULTURES  Right 12/02/2021    LAMINOTOMY: W/DCMPRSN NRV ROOT 1 INTRSPC LUMBR  09/15/2009    LAPAROSCOPIC CHOLECYSTECTOMY      METATARSAL RAY 2ND TOE AMPUTATION Right 05/30/2019    PHOTOCOAGULATION OF THE EYE Left     3/8/2011, 6/27/2011, 10/18/2011    PHOTOCOAGULATION OF THE EYE Left 05/25/2012    PHOTOCOAGULATION OF THE EYE Left 03/18/2013    PHOTOCOAGULATION OF THE EYES Bilateral 11/07/2012    SESAMOIDECTOMY: GREAT TOE DISTAL PHALANX SESAMOID EXCISION & TOE HALLUX AMPUTATION Right 06/21/2018    TOE AMPUTATION: GREAT TOE Right 08/09/2018    TRANSMETATARSAL AMPUTATION  OF FOOT Right 09/08/2021    VASECTOMY           Social History   I have reviewed this patient's social history and updated it with pertinent information if needed.  Social History     Tobacco Use    Smoking status: Never    Smokeless tobacco: Former     Types: Chew   Substance Use Topics    Alcohol use: Yes     Comment: 3 drinks/week    Drug use: Not Currently          Prior to Admission Medications  - Pending pharmacy reconciliation   Prior to Admission Medications   Prescriptions Last Dose Informant Patient Reported? Taking?   CHERRY CONCENTRATE PO   Yes No   Sig: Take 2 tablets by mouth 3 times daily   CHROMIUM PICOLINATE PO   Yes No   Sig: Take 1 tablet by mouth 2 times daily (before meals)   DANDELION PO   Yes No   Sig: Take 1 tablet by mouth daily   HYDROmorphone (DILAUDID) 2 MG tablet   No No   Sig: Take 1 tablet (2 mg) by mouth every 6 hours as needed for pain   Patient not taking: Reported on 7/22/2024   L-GLUTAMINE PO   Yes No   Sig: Take 5 g by mouth 2 times daily   Levomefolic Acid (5-MTHF PO)   Yes No   Levomefolic Acid (5-MTHF) 1 MG CAPS   Yes No   Sig: Take 1-7 mg by mouth daily   MAGNESIUM GLYCINATE PO   Yes No   Sig: Take 2 capsules by mouth 2 times daily   acetaminophen (TYLENOL) 500 MG tablet   No No   Sig: Take 2 tablets (1,000 mg) by mouth 3 times daily   allopurinol (ZYLOPRIM) 100 MG tablet   Yes No   Sig: Take 100 mg by mouth daily as needed (elevated purines)   amLODIPine (NORVASC) 10 MG tablet   Yes No   Sig: Take 10 mg by mouth daily   aspirin 81 MG EC tablet   Yes No   Sig: Take 1 tablet (81 mg) by mouth daily   buPROPion (WELLBUTRIN XL) 300 MG 24 hr tablet   Yes No   Sig: Take 300 mg by mouth every morning   carvedilol (COREG) 25 MG tablet   No No   Sig: Take 1 tablet (25 mg) by mouth 2 times daily (with meals)   cloNIDine (CATAPRES-TTS1) 0.1 MG/24HR WK patch   Yes No   Sig: Place 1 patch onto the skin once a week   hydrALAZINE (APRESOLINE) 100 MG tablet   No No   Sig: Take 1 tablet (100  mg) by mouth every 8 hours   hydrOXYzine HCl (ATARAX) 10 MG tablet   No No   Sig: Take 1-2 tablets (10-20 mg) by mouth 2 times daily as needed for itching or anxiety   insulin glargine (LANTUS PEN) 100 UNIT/ML pen   Yes No   Sig: Inject 100 Units Subcutaneous at bedtime   insulin glargine (LANTUS PEN) 100 UNIT/ML pen   No No   Sig: Inject 15 Units Subcutaneous every morning   insulin lispro (HUMALOG KWIKPEN) 100 UNIT/ML (1 unit dial) KWIKPEN   Yes No   Sig: Inject 0-12 Units subcutaneously 3 times daily (before meals) Hasn't been using much, but if BG>190 he takes 12 units    Also uses 1 unit for 15g carbohydrates.   insulin pen needle (30G X 8 MM) 30G X 8 MM miscellaneous   Yes No   Sig: Use 4 pen needles daily or as directed.   levothyroxine (SYNTHROID/LEVOTHROID) 50 MCG tablet   Yes No   Sig: Take 50 mcg by mouth daily   losartan (COZAAR) 50 MG tablet   Yes No   Sig: Take 50 mg by mouth daily   methocarbamol (ROBAXIN) 500 MG tablet   No No   Sig: Take 2 tablets (1,000 mg) by mouth every 6 hours as needed for muscle spasms   methylPREDNISolone (MEDROL DOSEPAK) 4 MG tablet therapy pack   No No   Sig: Follow Package Directions   nitroGLYcerin (NITROSTAT) 0.4 MG sublingual tablet   Yes No   Sig: Place 0.4 mg under the tongue every 5 minutes as needed for chest pain For chest pain place 1 tablet under the tongue every 5 minutes for 3 doses. If symptoms persist 5 minutes after 1st dose call 911.   omeprazole (PRILOSEC) 20 MG DR capsule   Yes No   Sig: Take 20 mg by mouth daily   pregabalin (LYRICA) 50 MG capsule   No No   Sig: Take 1 capsule (50 mg) by mouth 3 times daily   pyridOXINE (VITAMIN B-6) 50 MG tablet   Yes No   Sig: Take 50 mg by mouth daily   red yeast rice 600 MG CAPS   Yes No   Sig: Take 600 mg by mouth 2 times daily   senna-docusate (SENOKOT-S/PERICOLACE) 8.6-50 MG tablet   No No   Sig: Take 1 tablet by mouth 2 times daily as needed for constipation   sildenafil (REVATIO) 20 MG tablet   Yes No   Sig:  Take 100 mg by mouth daily as needed   tamsulosin (FLOMAX) 0.4 MG capsule   No No   Sig: Take 1 capsule (0.4 mg) by mouth daily   tirzepatide (MOUNJARO) 10 MG/0.5ML pen   Yes No   Sig: Inject 10 mg Subcutaneous every 7 days   torsemide (DEMADEX) 20 MG tablet   Yes No   Sig: Take 40 mg by mouth daily   valACYclovir (VALTREX) 1000 mg tablet   Yes No   Sig: Take 1,000 mg by mouth 2 times daily as needed X 1 day for cold sores   vitamin B complex with vitamin C (VITAMIN  B COMPLEX) tablet   Yes No   Sig: Take 1 tablet by mouth daily      Facility-Administered Medications: None     Allergies   Allergies   Allergen Reactions    Vancomycin Itching     riddle    Codeine Itching     Can take with benadryl    Morphine Itching     Can take with benadryl       Physical Exam   Vital Signs:                    Weight: 0 lbs 0 oz    Constitutional: Well-appearing, NAD  Respiratory: Clear to auscultation bilaterally, good air movement, normal effort   Cardiovascular: RRR, no m/r/g. No peripheral edema.   GI: Soft, non-tender, non-distended. No rebound tenderness or guarding.   Skin: Warm, dry  Neurologic: Alert. Responding to questions appropriately. Following commands. Unable to lift left leg off of bed, weakness with flexion at the hip, plantar flexion of left foot, extension of left leg at the knee.  Numbness to light touch inner left thigh and lower leg below the knee. Baseline numbness in bilateral feet  Psychiatric: Normal affect, appropriate      Data   Data reviewed today: I reviewed all medications, new labs and imaging results over the last 24 hours. I personally reviewed no images or EKG's today.    Recent Labs   Lab 08/10/24  0116   *       Recent Results (from the past 24 hour(s))   XR Knee Standing AP [place] Bilat Lat Left    Narrative    EXAM: XR KNEE BILAT AP W/LAT/SUN/PA LT  LOCATION: [facility]  DATE: 8/9/2024    INDICATION: Fell yesterday, severe pain, no bruising, s/p lumbar decompression 7/10.  Unspecified fall, initial encounter. Pain in left knee.  COMPARISON: 6/17/2024.    IMPRESSION:   LEFT KNEE: Mild patellofemoral compartment degenerative change. There is no evidence of an acute fracture or significant joint effusion.    RIGHT KNEE: Frontal view right knee is unremarkable.    Atherosclerotic vascular calcifications.   MR Lumbar Spine w/o & w Contrast    Narrative    EXAM: MR LUMBAR SPINE W/WO IV CONT  LOCATION: [facility]  DATE: 8/9/2024    INDICATION: Recent surgery, now cannot move left leg.  COMPARISON: Lumbar spine MRI 5/25/2024.  CONTRAST: Gadobutrol 1 mmol/mL IV soln 10 mL  TECHNIQUE: Routine Lumbar Spine MRI without and with IV contrast.    FINDINGS: Alignment is significant for multilevel subtle grade 1 spondylolisthesis. Bone marrow demonstrates minimal degenerative endplate changes surrounding the L3-4, L4-5, L5-S1 disc joints. No high-grade marrow edema. Left laminectomy changes at L3-4. Right laminectomy changes at L4-5 and L5-S1. Conus medullaris is unremarkable, terminating at the level of the mid L1 vertebral body. Cauda equina is unremarkable. Presumed benign right renal cysts. Mild bilateral sacroiliac joint degenerative change.    L1-L2: Disc height maintained. No herniation. Normal facet joints. No foraminal or spinal canal stenosis.    L2-L3: Disc height maintained. No herniation. Mild bilateral facet arthropathy. No foraminal or spinal canal stenosis.     L3-L4: Left laminectomy changes. Small volume fluid signal is present at the laminectomy site. Mild disc height loss. Disc bulge with central disc extrusion extending slightly inferiorly within the intradural space possibly abutting the bilateral traversing L4 nerve roots within the lateral recesses (series 9, image 28), decreased in size compared to the prior. Nonspecific T1 hypointense enhancing signal within the left lateral recess encasing the traversing left L4 nerve root (series 10, image 75; series 14, image  87).    L4-L5: Right laminectomy changes. Severe disc height loss. Disc bulge possibly abutting the bilateral traversing L5 nerve roots within the lateral recesses (series 9, image 34). Mild bilateral facet arthropathy. No foraminal stenosis. Mild spinal canal stenosis. Slight asymmetric enhancement surrounding the traversing right L5 nerve root could represent scar tissue (series 15, image 3).    L5-S1: Severe disc height loss. Disc bulge with right central protrusion versus extrusion which may abut the traversing right S1 nerve root within the lateral recess.    IMPRESSION:  1.  Left laminectomy changes at L3-4.  2.  T1 hypointense enhancing tissue within the left lateral recess at L3-4 encasing the traversing left L4 nerve root, presumably postoperative changes, although superimposed extruded disc material/inflammatory change cannot be excluded. Infection not excluded.  3.  Right laminectomy changes at L4-5 and L5-S1.  4.  Other degenerative change and stenoses as detailed.

## 2024-08-10 NOTE — CONSULTS
NEUROSURGERY CONSULT        PLAN:    Mr. Traaseth's most recent imaging does not exhibit any pathology that correlate with his subjective concerns and objective findings on his physical exam. We do not recommend surgical intervention at this time.     Based on his physical exam, imaging review, and past treatments, we feel that it would be in his best interest to obtain a thoracic spine MRI WO, left knee x-ray to include upright ap/lat views, an electromyography of the left lower extremity to evaluate the nerve conductivity, Neurology consult and physical therapy evaluation.     Once all of this has been obtained we will further discuss possible surgical interventions or other conservative therapies.      We also discussed signs of a worsening problem that he should seek being evaluated.     Please page our on-call service for any questions or concerns.     It has been a pleasure meeting Mark Milton Traaseth. Thank you for having us be involved in his care.    ______________________________________________________________________    HPI:    Mark Milton Traaseth is a pleasant 59 year old male who presented to the [facility] Emergency Department and subsequently transferred to [facility] for consultation on lumbar spine pain with left lower extremity weakness.     He is well known to our team as Dr. Price performed a left lumbar 3 and lumbar 4 lateral recess decompression and microdiscectomy on 07/10/2024.    He describes the symptoms as a dull aching low lumbar pain that initiates in the midline low lumbar region and radiates horizontally in a belt line distribution as well as distally in what sounds like the L4 distribution. He admit that the pain has improved since surgery, but is accompanied with paresthesia and weakness. He states the pain transitions to a burning sensation from his left knee down to his foot. He has fallen five times due to his leg giving out. He has also has a couple episodes of urinary  and bowel incontinence due to not getting to the bathroom soon enough. No other concerns are voiced.    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     ASCVD (arteriosclerotic cardiovascular disease)     Attention deficit hyperactivity disorder (ADHD), predominantly inattentive type, in remission     Cellulitis of right lower extremity     Decreased sex drive     Depression with anxiety     Diabetic macular edema of both eyes (H)     Essential hypertension     Gout     Hepatic steatosis     History of basal cell carcinoma of skin: face     Irritable bowel syndrome without diarrhea     Mixed hyperlipidemia     Motion sickness     JANAK (obstructive sleep apnea)     Personal history of malignant melanoma of skin     Stented coronary artery     Type 2 diabetes mellitus with diabetic leg ulcer (H)     Unilateral primary osteoarthritis, right hip        Past Surgical History:   Procedure Laterality Date    5TH RAY RESECTION, APPLICATION OF ANTIBIOTIC Left 03/04/2020    APPENDECTOMY  11/16/2004    ARTHROSCOPIC SHOULDER DECOMPRESSION Right 08/20/2010    BACK SURGERY      CHONDRECTOMY OF SPINE: DISCECTOMY  09/15/2009    DECOMPRESSION LUMBAR MINIMALLY INVASIVE ONE LEVEL Left 7/10/2024    Procedure: LEFT LUMBAR 3, LUMBAR 4 LATERAL RECESS DECOMPRESSION AND MICRODISCECTOMY;  Surgeon: Dom Price MD;  Location:  OR    EYE LASER TREATMENT  2014    IR LUMBAR/SACRAL TRANSFOR INJ BILATERAL Bilateral 05/28/2024    IRRIGATION AND DEBRIDEMENT LOWER EXTREMITY WITH EXCISION OSTEOMYELITIC BONE FOR BIOPSY WITH CULTURES  Right 12/02/2021    LAMINOTOMY: W/DCMPRSN NRV ROOT 1 INTRSPC LUMBR  09/15/2009    LAPAROSCOPIC CHOLECYSTECTOMY      METATARSAL RAY 2ND TOE AMPUTATION Right 05/30/2019    PHOTOCOAGULATION OF THE EYE Left     3/8/2011, 6/27/2011, 10/18/2011    PHOTOCOAGULATION OF THE EYE Left 05/25/2012    PHOTOCOAGULATION OF THE EYE Left 03/18/2013    PHOTOCOAGULATION OF THE  EYES Bilateral 11/07/2012    SESAMOIDECTOMY: GREAT TOE DISTAL PHALANX SESAMOID EXCISION & TOE HALLUX AMPUTATION Right 06/21/2018    TOE AMPUTATION: GREAT TOE Right 08/09/2018    TRANSMETATARSAL AMPUTATION OF FOOT Right 09/08/2021    VASECTOMY         Allergies   Allergen Reactions    Vancomycin Itching     riddle    Codeine Itching     Can take with benadryl    Morphine Itching     Can take with benadryl       Social History     Tobacco Use    Smoking status: Never    Smokeless tobacco: Former     Types: Chew   Substance Use Topics    Alcohol use: Yes     Comment: 3 drinks/week       No family history on file.    Scheduled Medications    Current Facility-Administered Medications   Medication Dose Route Frequency Provider Last Rate Last Admin    insulin aspart (NovoLOG) injection (RAPID ACTING)  1-12 Units Subcutaneous Q4H All Connell MD   4 Units at 08/10/24 0607    insulin aspart (NovoLOG) injection (RAPID ACTING)   Subcutaneous TID w/meals All Connell MD        insulin glargine (LANTUS PEN) injection 80 Units  80 Units Subcutaneous QAM AC All Connell MD   80 Units at 08/10/24 0906    sodium chloride (PF) 0.9% PF flush 3 mL  3 mL Intracatheter Q8H All Connell MD   3 mL at 08/10/24 0134       Home Medications    CHERRY CONCENTRATE PO     Yes No   Sig: Take 2 tablets by mouth 3 times daily   CHROMIUM PICOLINATE PO     Yes No   Sig: Take 1 tablet by mouth 2 times daily (before meals)   DANDELION PO     Yes No   Sig: Take 1 tablet by mouth daily   HYDROmorphone (DILAUDID) 2 MG tablet     No No   Sig: Take 1 tablet (2 mg) by mouth every 6 hours as needed for pain   Patient not taking: Reported on 7/22/2024   L-GLUTAMINE PO     Yes No   Sig: Take 5 g by mouth 2 times daily   Levomefolic Acid (5-MTHF PO)     Yes No   Levomefolic Acid (5-MTHF) 1 MG CAPS     Yes No   Sig: Take 1-7 mg by mouth daily   MAGNESIUM GLYCINATE PO     Yes No   Sig: Take 2 capsules by mouth 2 times daily    acetaminophen (TYLENOL) 500 MG tablet     No No   Sig: Take 2 tablets (1,000 mg) by mouth 3 times daily   allopurinol (ZYLOPRIM) 100 MG tablet     Yes No   Sig: Take 100 mg by mouth daily as needed (elevated purines)   amLODIPine (NORVASC) 10 MG tablet     Yes No   Sig: Take 10 mg by mouth daily   aspirin 81 MG EC tablet     Yes No   Sig: Take 1 tablet (81 mg) by mouth daily   buPROPion (WELLBUTRIN XL) 300 MG 24 hr tablet     Yes No   Sig: Take 300 mg by mouth every morning   carvedilol (COREG) 25 MG tablet     No No   Sig: Take 1 tablet (25 mg) by mouth 2 times daily (with meals)   cloNIDine (CATAPRES-TTS1) 0.1 MG/24HR WK patch     Yes No   Sig: Place 1 patch onto the skin once a week   hydrALAZINE (APRESOLINE) 100 MG tablet     No No   Sig: Take 1 tablet (100 mg) by mouth every 8 hours   hydrOXYzine HCl (ATARAX) 10 MG tablet     No No   Sig: Take 1-2 tablets (10-20 mg) by mouth 2 times daily as needed for itching or anxiety   insulin glargine (LANTUS PEN) 100 UNIT/ML pen     Yes No   Sig: Inject 100 Units Subcutaneous at bedtime   insulin glargine (LANTUS PEN) 100 UNIT/ML pen     No No   Sig: Inject 15 Units Subcutaneous every morning   insulin lispro (HUMALOG KWIKPEN) 100 UNIT/ML (1 unit dial) KWIKPEN     Yes No   Sig: Inject 0-12 Units subcutaneously 3 times daily (before meals) Hasn't been using much, but if BG>190 he takes 12 units     Also uses 1 unit for 15g carbohydrates.   insulin pen needle (30G X 8 MM) 30G X 8 MM miscellaneous     Yes No   Sig: Use 4 pen needles daily or as directed.   levothyroxine (SYNTHROID/LEVOTHROID) 50 MCG tablet     Yes No   Sig: Take 50 mcg by mouth daily   losartan (COZAAR) 50 MG tablet     Yes No   Sig: Take 50 mg by mouth daily   methocarbamol (ROBAXIN) 500 MG tablet     No No   Sig: Take 2 tablets (1,000 mg) by mouth every 6 hours as needed for muscle spasms   methylPREDNISolone (MEDROL DOSEPAK) 4 MG tablet therapy pack     No No   Sig: Follow Package Directions    nitroGLYcerin (NITROSTAT) 0.4 MG sublingual tablet     Yes No   Sig: Place 0.4 mg under the tongue every 5 minutes as needed for chest pain For chest pain place 1 tablet under the tongue every 5 minutes for 3 doses. If symptoms persist 5 minutes after 1st dose call 911.   omeprazole (PRILOSEC) 20 MG DR capsule     Yes No   Sig: Take 20 mg by mouth daily   pregabalin (LYRICA) 50 MG capsule     No No   Sig: Take 1 capsule (50 mg) by mouth 3 times daily   pyridOXINE (VITAMIN B-6) 50 MG tablet     Yes No   Sig: Take 50 mg by mouth daily   red yeast rice 600 MG CAPS     Yes No   Sig: Take 600 mg by mouth 2 times daily   senna-docusate (SENOKOT-S/PERICOLACE) 8.6-50 MG tablet     No No   Sig: Take 1 tablet by mouth 2 times daily as needed for constipation   sildenafil (REVATIO) 20 MG tablet     Yes No   Sig: Take 100 mg by mouth daily as needed   tamsulosin (FLOMAX) 0.4 MG capsule     No No   Sig: Take 1 capsule (0.4 mg) by mouth daily   tirzepatide (MOUNJARO) 10 MG/0.5ML pen     Yes No   Sig: Inject 10 mg Subcutaneous every 7 days   torsemide (DEMADEX) 20 MG tablet     Yes No   Sig: Take 40 mg by mouth daily   valACYclovir (VALTREX) 1000 mg tablet     Yes No   Sig: Take 1,000 mg by mouth 2 times daily as needed X 1 day for cold sores   vitamin B complex with vitamin C (VITAMIN  B COMPLEX) tablet     Yes No   Sig: Take 1 tablet by mouth daily     PRN Medications    Current Facility-Administered Medications   Medication Dose Route Frequency Provider Last Rate Last Admin    acetaminophen (TYLENOL) tablet 650 mg  650 mg Oral Q4H PRN All Connell MD        Or    acetaminophen (TYLENOL) Suppository 650 mg  650 mg Rectal Q4H PRN All Connell MD        benzocaine-menthol (CHLORASEPTIC) 6-10 MG lozenge 1 lozenge  1 lozenge Buccal Q1H PRN All Connell MD        glucose gel 15-30 g  15-30 g Oral Q15 Min PRN All Connell MD        Or    dextrose 50 % injection 25-50 mL  25-50 mL Intravenous  Q15 Min PRN All Connell MD        Or    glucagon injection 1 mg  1 mg Subcutaneous Q15 Min PRAll Baker MD        diphenhydrAMINE (BENADRYL) capsule 25 mg  25 mg Oral Q6H PRN All Connell MD   25 mg at 08/10/24 0948    hydrALAZINE (APRESOLINE) tablet 10 mg  10 mg Oral Q4H PRN All Connell MD        Or    hydrALAZINE (APRESOLINE) injection 10 mg  10 mg Intravenous Q4H PRN All Connell MD        HYDROmorphone (DILAUDID) injection 0.2 mg  0.2 mg Intravenous Q2H PRN All Connell MD        HYDROmorphone (DILAUDID) injection 0.4 mg  0.4 mg Intravenous Q2H PRN All Connell MD   0.4 mg at 08/10/24 0952    HYDROmorphone (DILAUDID) tablet 2 mg  2 mg Oral Q4H PRN All Connell MD        HYDROmorphone (DILAUDID) tablet 4 mg  4 mg Oral Q4H PRN All Connell MD   4 mg at 08/10/24 0230    lidocaine (LMX4) cream   Topical Q1H PRN All Connell MD        lidocaine 1 % 0.1-1 mL  0.1-1 mL Other Q1H PRAll Baker MD        naloxone (NARCAN) injection 0.2 mg  0.2 mg Intravenous Q2 Min PRAll Baker MD        Or    naloxone (NARCAN) injection 0.4 mg  0.4 mg Intravenous Q2 Min PRAll Baker MD        Or    naloxone (NARCAN) injection 0.2 mg  0.2 mg Intramuscular Q2 Min PRAll Baker MD        Or    naloxone (NARCAN) injection 0.4 mg  0.4 mg Intramuscular Q2 Min All Shore MD        ondansetron (ZOFRAN ODT) ODT tab 4 mg  4 mg Oral Q6H PRN All Connell MD        Or    ondansetron (ZOFRAN) injection 4 mg  4 mg Intravenous Q6H PRN All Connell MD        polyethylene glycol (MIRALAX) Packet 17 g  17 g Oral BID PRN All Connell MD        prochlorperazine (COMPAZINE) injection 10 mg  10 mg Intravenous Q6H PRN All Connell MD        Or    prochlorperazine (COMPAZINE) tablet 10 mg  10 mg Oral Q6H PRN All Connell MD        Or     prochlorperazine (COMPAZINE) suppository 25 mg  25 mg Rectal Q12H PRN All Connell MD        senna-docusate (SENOKOT-S/PERICOLACE) 8.6-50 MG per tablet 1 tablet  1 tablet Oral BID PRN All Connell MD        Or    senna-docusate (SENOKOT-S/PERICOLACE) 8.6-50 MG per tablet 2 tablet  2 tablet Oral BID All Shore MD        sodium chloride (PF) 0.9% PF flush 3 mL  3 mL Intracatheter q1 min prAll Villafuerte MD           ROS: 10 point ROS neg other than the symptoms noted above in the HPI.    Vitals:    BP (!) 155/79 (BP Location: Right arm)   Pulse 63   Temp 97.2  F (36.2  C) (Oral)   Resp 18   SpO2 96%   There is no height or weight on file to calculate BMI.  I/O last 3 completed shifts:  In: -   Out: 950 [Urine:950]    Exam:    Patient appears comfortable, conversational, and in no apparent distress.   Head: Normocephalic, without obvious abnormality, atraumatic, no facial asymmetry.   Eyes: conjunctivae/corneas clear. PERRL, EOM's intact.   Throat: lips, mucosa, and tongue normal; teeth and gums normal.   Neck: supple, symmetrical, trachea midline, no adenopathy and thyroid: not enlarged, symmetric, no tenderness/mass/nodules.   Lungs: clear to auscultation bilaterally.   Heart: regular rate and rhythm.   Abdomen: soft, non-tender; bowel sounds normal; no masses, no organomegaly.   Pulses: 2+ and symmetric.   Skin: Skin color, texture, turgor normal. No rashes or lesions.     CN II-XII grossly intact, alert and appropriate with conversation and following commands.   Cervical spine is non tender to palpation. Appropriate range of motion of neck, not concerning for lhermitte's phenomenon.   Bilateral bicep 2/4 and tricep reflexes 1/4. Sensation intact throughout upper extremities.     UE muscle strength  Right  Left    Deltoid  5/5  5/5    Biceps  5/5  5/5    Triceps  5/5  5/5    Hand intrinsics  5/5  5/5    Hand grasp  5/5  5/5    Quinonez signs  neg  neg      Lumbar  spine is slightly tender to palpation.   Diminished sensation throughout left worse than right lower extremities.   Bilateral patellar 1/4 and achilles reflex 1/4.     LE muscle strength  Right  Left    Iliopsoas (hip flexion)  5/5  2/5    Quad (knee extension)  5/5  3+/5    Hamstring (knee flexion)  5/5  5/5    Gastrocnemius (PF)  5/5  5/5    Tibialis Ant. (DF)  5/5  5/5    EHL  5/5  5/5      Negative Babinski bilaterally. Negative for clonus.   Calves are soft and non-tender bilaterally.     Imaging: No imaging available, just report. Staff working on obtaining imaging.   Impression per radiology read - I have personally reviewed the images with the patient.    MR LUMBAR SPINE W/WO IV CONT - 8/9/2024     COMPARISON: Lumbar spine MRI 5/25/2024.     FINDINGS: Alignment is significant for multilevel subtle grade 1 spondylolisthesis. Bone marrow demonstrates minimal degenerative endplate changes surrounding the L3-4, L4-5, L5-S1 disc joints. No high-grade marrow edema. Left laminectomy changes at L3-4. Right laminectomy changes at L4-5 and L5-S1. Conus medullaris is unremarkable, terminating at the level of the mid L1 vertebral body. Cauda equina is unremarkable. Presumed benign right renal cysts. Mild bilateral sacroiliac joint degenerative change.     L1-L2: Disc height maintained. No herniation. Normal facet joints. No foraminal or spinal canal stenosis.     L2-L3: Disc height maintained. No herniation. Mild bilateral facet arthropathy. No foraminal or spinal canal stenosis.     L3-L4: Left laminectomy changes. Small volume fluid signal is present at the laminectomy site. Mild disc height loss. Disc bulge with central disc extrusion extending slightly inferiorly within the intradural space possibly abutting the bilateral traversing L4 nerve roots within the lateral recesses (series 9, image 28), decreased in size compared to the prior. Nonspecific T1 hypointense enhancing signal within the left lateral recess  "encasing the traversing left L4 nerve root (series 10, image 75; series 14, image 87).     L4-L5: Right laminectomy changes. Severe disc height loss. Disc bulge possibly abutting the bilateral traversing L5 nerve roots within the lateral recesses (series 9, image 34). Mild bilateral facet arthropathy. No foraminal stenosis. Mild spinal canal stenosis. Slight asymmetric enhancement surrounding the traversing right L5 nerve root could represent scar tissue (series 15, image 3).     L5-S1: Severe disc height loss. Disc bulge with right central protrusion versus extrusion which may abut the traversing right S1 nerve root within the lateral recess.     IMPRESSION:   1. Left laminectomy changes at L3-4.   2.  T1 hypointense enhancing tissue within the left lateral recess at L3-4 encasing the traversing left L4 nerve root, presumably postoperative changes, although superimposed extruded disc material/inflammatory change cannot be excluded. Infection not excluded.   3.  Right laminectomy changes at L4-5 and L5-S1.   4.  Other degenerative change and stenoses as detailed.     Available labs at time of consult:       Recent Labs   Lab 08/10/24  0832   WBC 6.2   HGB 12.7*   HCT 37.0*   MCV 87        Recent Labs   Lab 08/10/24  0832   WBC 6.2   HGB 12.7*   HCT 37.0*   MCV 87        No results for input(s): \"SED\", \"CRP\" in the last 168 hours.  Recent Labs   Lab 08/10/24  0832   HGB 12.7*     No results for input(s): \"INR\" in the last 168 hours.  No results for input(s): \"LIPASE\" in the last 168 hours.  Recent Labs   Lab 08/10/24  0832        Recent Labs   Lab 08/10/24  0832   WBC 6.2         Respectfully,    Nadir Olivo, MPAS, SUMA  [facility] Neurosurgery  [facility]     Tel: [phone]      All imaging, physical findings, and the above plan have been reviewed with Dr. Price.    "

## 2024-08-11 ENCOUNTER — APPOINTMENT (OUTPATIENT)
Dept: PHYSICAL THERAPY | Facility: CLINIC | Age: 59
DRG: 556 | End: 2024-08-11
Attending: INTERNAL MEDICINE
Payer: COMMERCIAL

## 2024-08-11 VITALS
WEIGHT: 292.11 LBS | BODY MASS INDEX: 37.49 KG/M2 | TEMPERATURE: 97.7 F | HEART RATE: 64 BPM | HEIGHT: 74 IN | RESPIRATION RATE: 18 BRPM | DIASTOLIC BLOOD PRESSURE: 75 MMHG | SYSTOLIC BLOOD PRESSURE: 168 MMHG | OXYGEN SATURATION: 95 %

## 2024-08-11 LAB
GLUCOSE BLDC GLUCOMTR-MCNC: 135 MG/DL (ref 70–99)
GLUCOSE BLDC GLUCOMTR-MCNC: 175 MG/DL (ref 70–99)
GLUCOSE BLDC GLUCOMTR-MCNC: 180 MG/DL (ref 70–99)
POTASSIUM SERPL-SCNC: 4.7 MMOL/L (ref 3.4–5.3)

## 2024-08-11 PROCEDURE — G0378 HOSPITAL OBSERVATION PER HR: HCPCS

## 2024-08-11 PROCEDURE — 250N000013 HC RX MED GY IP 250 OP 250 PS 637: Performed by: INTERNAL MEDICINE

## 2024-08-11 PROCEDURE — 97116 GAIT TRAINING THERAPY: CPT | Mod: GP | Performed by: PHYSICAL THERAPIST

## 2024-08-11 PROCEDURE — 99239 HOSP IP/OBS DSCHRG MGMT >30: CPT | Performed by: INTERNAL MEDICINE

## 2024-08-11 PROCEDURE — 36415 COLL VENOUS BLD VENIPUNCTURE: CPT | Performed by: INTERNAL MEDICINE

## 2024-08-11 PROCEDURE — 84132 ASSAY OF SERUM POTASSIUM: CPT | Performed by: INTERNAL MEDICINE

## 2024-08-11 PROCEDURE — 97161 PT EVAL LOW COMPLEX 20 MIN: CPT | Mod: GP | Performed by: PHYSICAL THERAPIST

## 2024-08-11 PROCEDURE — 97530 THERAPEUTIC ACTIVITIES: CPT | Mod: GP | Performed by: PHYSICAL THERAPIST

## 2024-08-11 RX ADMIN — ACETAMINOPHEN 650 MG: 325 TABLET ORAL at 14:34

## 2024-08-11 RX ADMIN — BUPROPION HYDROCHLORIDE 300 MG: 300 TABLET, EXTENDED RELEASE ORAL at 08:33

## 2024-08-11 RX ADMIN — INSULIN GLARGINE 80 UNITS: 100 INJECTION, SOLUTION SUBCUTANEOUS at 10:10

## 2024-08-11 RX ADMIN — ATORVASTATIN CALCIUM 40 MG: 40 TABLET, FILM COATED ORAL at 08:33

## 2024-08-11 RX ADMIN — PANTOPRAZOLE SODIUM 40 MG: 40 TABLET, DELAYED RELEASE ORAL at 08:33

## 2024-08-11 RX ADMIN — LOSARTAN POTASSIUM 50 MG: 50 TABLET, FILM COATED ORAL at 08:33

## 2024-08-11 RX ADMIN — LEVOTHYROXINE SODIUM 50 MCG: 25 TABLET ORAL at 06:47

## 2024-08-11 RX ADMIN — ACETAMINOPHEN 1000 MG: 500 TABLET, FILM COATED ORAL at 08:32

## 2024-08-11 RX ADMIN — PREGABALIN 50 MG: 50 CAPSULE ORAL at 08:33

## 2024-08-11 RX ADMIN — CARVEDILOL 12.5 MG: 12.5 TABLET, FILM COATED ORAL at 08:33

## 2024-08-11 RX ADMIN — AMLODIPINE BESYLATE 10 MG: 10 TABLET ORAL at 08:33

## 2024-08-11 RX ADMIN — ALLOPURINOL 100 MG: 100 TABLET ORAL at 08:33

## 2024-08-11 RX ADMIN — HYDRALAZINE HYDROCHLORIDE 100 MG: 50 TABLET ORAL at 08:33

## 2024-08-11 ASSESSMENT — ACTIVITIES OF DAILY LIVING (ADL)
ADLS_ACUITY_SCORE: 38
ADLS_ACUITY_SCORE: 39
ADLS_ACUITY_SCORE: 39
ADLS_ACUITY_SCORE: 38
ADLS_ACUITY_SCORE: 38
ADLS_ACUITY_SCORE: 39
ADLS_ACUITY_SCORE: 38

## 2024-08-11 NOTE — PLAN OF CARE
Date/Time: 8/11  Summary: L Lumbar Radiculopathy  Precautions: Fall  Cognitive Concerns/Orientation: A&O  Behavior and Aggression Color: Calm & cooperative  ABNL VS/O2: WDL's, RA  CMS: Generalized weakness, LLE active ROM moderately impaired  Edema: BLE +1, mild  Mobility: 1A w/FWW/gait belt  Pain Management: pt w/7/10 pain (scheduled Tylenol, refusing prn pain meds)  Diet: Cons carb  Bowel/Bladder: WDL's  ABNL Labs/BG: AC/HS  Drain/Devices: SL PIV  Telemetry Rhythm: -  Skin: L knee abrasion  Tests/Procedures: EMG in outpt setting  Discharge Date: TBD  Other Info:

## 2024-08-11 NOTE — PROGRESS NOTES
"Neurosurgery Progress     Dx:     Dr. Price performed a left lumbar 3 and lumbar 4 lateral recess decompression and microdiscectomy on 07/10/2024.    Returned on 08/09 for lumbar spine pain with left lower extremity weakness.    Neuro stable.     TODAY'S PLAN:     -Knee XR - normal.  -Knee MRI normal.  -T-spine MR normal.  -L-spine normal.   -EMG to be done as outpatient.   -Continue physical therapy as an outpatient.   -Already had MDP.   -Anticipate discharge today.   -We will cancel his follow-up appointment with us scheduled for tomorrow.   -Advance activity as tolerated.  -Continue supportive and symptomatic treatment.  -Pain control measures.  -Advance diet as tolerated.  -Routine wound care.    In the event that patient's symptoms worsen or change we would appreciate being contacted. We did discuss signs of a worsening problem that he should seek being evaluated.     We did review the above information with the patient whom agrees with the plan and did verbalize understanding.   ________________________________________________________________     BP (!) 168/75 (BP Location: Right arm)   Pulse 64   Temp 97.7  F (36.5  C) (Oral)   Resp 18   Ht 1.88 m (6' 2\")   Wt 132.5 kg (292 lb 1.8 oz)   SpO2 95%   BMI 37.50 kg/m       Pt in bed. Appears comfortable and in no apparent distress, moving all extremities.   CN II-XII intact, alert and appropriate with conversation and following commands.   Bilateral upper with appropriate strength.   Left quad 3/5. DTR's WNL. Spine is non tender to palpation throughout. . Sensation intact throughout. Acceptable ROM.   Calves soft and non-tender bilaterally.     All pertinent labs and updated imaging reviewed in EPIC.     Nadir Olivo PA-C   Neurosurgery     Reviewed with Dr. Price    "

## 2024-08-11 NOTE — PLAN OF CARE
Physical Therapy Discharge Summary    Reason for therapy discharge:    Discharged to home with outpatient therapy.    Progress towards therapy goal(s). See goals on Care Plan in [facility] electronic health record for goal details.  Goals not met.  Barriers to achieving goals:   discharge from facility.    Therapy recommendation(s):    Continued therapy is recommended.  Rationale/Recommendations:  Pt will benefit from OP PT in order to progress strength and decrease fall risk.

## 2024-08-11 NOTE — PLAN OF CARE
Goal Outcome Evaluation:    Shift Summary [id number]    Admitting Diagnosis: post-op complications  Left lumbar radiculopathy   Vitals Vital signs:  Temp: 98  F (36.7  C) Temp src: Oral BP: (!) 148/76 Pulse: 58   Resp: 18 SpO2: 98 % O2 Device: None (Room air)   Pain 8/10. Taking Dilaudid PRN.   A&Ox4  Voiding Continent, uses urinal  Mobility A1  CMS Baseline numbness on LE  Lung Sounds Clear on All bases  GI BS +4    Orders Placed This Encounter      Moderate Consistent Carb (60 g CHO per Meal) Diet       Plan:

## 2024-08-11 NOTE — PLAN OF CARE
Goal Outcome Evaluation:     Alert and oriented X4. Assist of one with walker and gait belt. Regular diet.  Numbness and tingling on LLE. Pt uses Urinal at bedside with adequate output. Denies pain.  DMT2 and blood sugar check Q4 hours.

## 2024-08-11 NOTE — PROGRESS NOTES
08/11/24 1100   Appointment Info   Signing Clinician's Name / Credentials (PT) Mar Medina DPT   Living Environment   People in Home spouse   Current Living Arrangements house   Home Accessibility stairs to enter home;stairs within home   Number of Stairs, Main Entrance 3   Stair Railings, Main Entrance none   Number of Stairs, Within Home, Primary greater than 10 stairs  (15)   Stair Railings, Within Home, Primary railing on left side (ascending)   Transportation Anticipated car, drives self   Living Environment Comments Prior to surgery 7/10, pt was independent.   Self-Care   Usual Activity Tolerance good   Current Activity Tolerance moderate   Equipment Currently Used at Home walker, rolling   Fall history within last six months yes   Number of times patient has fallen within last six months 5   Activity/Exercise/Self-Care Comment Pt owns FWW.   General Information   Onset of Illness/Injury or Date of Surgery 08/09/24   Referring Physician Rosa Allen MD   Patient/Family Therapy Goals Statement (PT) Pt is frustrated with hospital course. Pt reports he plans to discharge today.   Pertinent History of Current Problem (include personal factors and/or comorbidities that impact the POC) 58 y/o male, direct admit from [facility] for further neurosurgery evaluation d/t left leg pain and weakness. PMH including Left lumbar radiculopathy s/p left L3 decompressive hemilaminotomy, medial facetectomy, lateral recess decompression and microdiscectomy on 7/10/24, diabetes mellitus type 2 insulin-dependent last HbA1c of 7.5, hypertension, coronary artery disease s/p coronary stents x 2 on 5/11/2021 on 81 mg aspirin, obesity BMI of 37.67, JANAK, ADHD, depression, anxiety, spinal stenosis s/p lumbar 4 lumbar 5 decompression 20 years ago.   Existing Precautions/Restrictions fall   General Observations Pt standing EOB upon arrival of therapist.   Cognition   Affect/Mental Status (Cognition) WFL   Orientation  Status (Cognition) oriented x 3   Follows Commands (Cognition) WFL   Pain Assessment   Patient Currently in Pain   (Pt reports L knee pain at rest and with movement.)   Posture    Posture Comments Noted forward head and shoulder posture sitting EOB and standing at FWW.   Range of Motion (ROM)   ROM Comment B LEs WFL.   Strength (Manual Muscle Testing)   Strength Comments Not formally assessed, noted significant weakness of L hip flexors and quad muslce. Pt unable to complete L SLR and seated hip flexion. R LE WFL.   Bed Mobility   Comment, (Bed Mobility) NT.   Transfers   Comment, (Transfers) Sit <> stand with FWW and CGA.   Gait/Stairs (Locomotion)   Comment, (Gait/Stairs) Pt amb 10' with FWW and CGA. Noted L LE weakness with step-to gait pattern   Balance   Balance Comments Noted good seated balance and fair standing/dynamic balance d/t L LE weakness.   Clinical Impression   Criteria for Skilled Therapeutic Intervention Yes, treatment indicated   PT Diagnosis (PT) Difficulty with funcitonal mobility.   Influenced by the following impairments Pain, L LE weakness, Impaired balance, Decreased activity tolerance   Functional limitations due to impairments Limited functional mobility requiring AD and assist.   Clinical Presentation (PT Evaluation Complexity) stable   Clinical Presentation Rationale Based on PMH, current presentation, and social support.   Clinical Decision Making (Complexity) low complexity   Planned Therapy Interventions (PT) balance training;bed mobility training;gait training;ROM (range of motion);stair training;strengthening;transfer training   Risk & Benefits of therapy have been explained patient   PT Total Evaluation Time   PT Eval, Low Complexity Minutes (43616) 10   Physical Therapy Goals   PT Frequency Daily   PT Predicted Duration/Target Date for Goal Attainment 08/16/24   PT: Bed Mobility Supervision/stand-by assist;Supine to/from sit;Within precautions   PT: Transfers Supervision/stand-by  assist;Sit to/from stand;Assistive device;Within precautions   PT: Gait Supervision/stand-by assist;Rolling walker;150 feet;Within precautions   PT: Stairs Supervision/stand-by assist;8 stairs;Rail on left   Therapeutic Activity   Therapeutic Activities: dynamic activities to improve functional performance Minutes (39952) 9   Symptoms Noted During/After Treatment Fatigue;Increased pain   Treatment Detail/Skilled Intervention PT: Pt standing at EOB upon arrival of therapist, pt reports frustration regarding hospital stay and not getting answers regarding weakness in L LE. Pt performed sit <> stand with FWW and CGA, cues provided for hand placement and upright posture upon standing. Pt sitting up in chair at end of session, chair alarm on and needs within reach. Discussed with pt option for OP PT, pt in agreement.   Gait Training   Gait Training Minutes (78624) 15   Symptoms Noted During/After Treatment (Gait Training) fatigue;increased pain   Treatment Detail/Skilled Intervention PT: Gait training of 10' with FWW and CGA, noted gaurded amb with step-to gait pattern as pt reports feeling as though L LE will buckle. Applied knee immobilizer to L LE, pt amb an additional 75' and 30' with FWW and SBA, noted improved mobility with KI. Pt navigated 4 stairs with 1 railing and CGA progress to SBA.   Distance in Feet 10', 75' and 30'   PT Discharge Planning   PT Plan Progress transfers, ambulation and gait with FWW   PT Discharge Recommendation (DC Rec) home with assist;home with outpatient physical therapy   PT Rationale for DC Rec Pt presents with L LE weakness limiting mobility. Noted improvement in mobility with use of knee immobilizer. Discussed benefits of OP PT, pt in agreement.   PT Brief overview of current status SBA with use of FWW and KI on L LE   Total Session Time   Timed Code Treatment Minutes 24   Total Session Time (sum of timed and untimed services) 34

## 2024-08-11 NOTE — DISCHARGE SUMMARY
"[facility]  Hospitalist Discharge Summary      Date of Admission:  8/9/2024  Date of Discharge:  8/11/2024  Discharging Provider: Rosa Allen MD  Discharge Service: Hospitalist Service    Discharge Diagnoses     Left lower extremity pain and weakness, possibly secondary to left lumbar radiculopathy versus left peroneal nerve neuropathy.  S/P left L3 decompressive hemilaminotomy, medial facetectomy, lateral recess decompression and microdiscectomy 7/10/24   History of urinary retention during hospitalization, no current issues.  Benign prostate hyperplasia.  History of coronary artery disease with prior NSTEMI s/p MAE to mid LAD in May 2021.  Essential hypertension.  Hyperlipidemia.  Chronic venous insufficiency.  Chronic kidney disease, stage II.  Diabetes mellitus, type II.  Depression with anxiety.  Obstructive sleep apnea.  GERD.  Hypothyroidism.  Gout.    Clinically Significant Risk Factors     # DMII: A1C = 7.1 % (Ref range: <5.7 %) within past 6 months  # Obesity: Estimated body mass index is 37.5 kg/m  as calculated from the following:    Height as of this encounter: 1.88 m (6' 2\").    Weight as of this encounter: 132.5 kg (292 lb 1.8 oz).       Follow-ups Needed After Discharge   Follow-up Appointments     Follow-up and recommended labs and tests       Follow up with primary care provider, True Nogueira, within 7 days   for hospital follow- up.  No follow up labs or test are needed.  Patient   will need outpatient EMG ordered.  Follow-up with neurosurgery and neurology as recommended.            Unresulted Labs Ordered in the Past 30 Days of this Admission       No orders found from 7/10/2024 to 8/10/2024.            Discharge Disposition   Discharged to home  Condition at discharge: Stable    Hospital Course     Mark Milton Traaseth is a 59 year old male with past medical history significant for left lumbar radiculopathy s/p recent L3 spinal surgery 7/10/24 who presented to " [facility] with left leg pain and weakness, transferred for further neurosurgery evaluation. Admitted on 8/9/2024.     Here re further details regarding his current hospitalization:     Left lumbar radiculopathy s/p left L3 decompressive hemilaminotomy, medial facetectomy, lateral recess decompression and microdiscectomy 7/10/24  *Presents to [facility] with low back pain and leg pain after recent fall  *MRI with T1 hypointense enhancing tissue within the left lateral recess at L3-L4 encasing the traversing left L4 nerve root, presumably postoperative changes but superimposed extruded disc material/inflammatory change and infection not excluded  *Afebrile, WBC normal, CRP 0.5 (normal). Generally denies any infectious symptoms  *Reports persistent leg weakness, numbness unchanged following surgery. Reports previous leg pain had resolved with surgery  *Case discussed with neurosurgery prior to transfer     --Patient was admitted for further evaluation and management.  --After hospitalization, patient was evaluated by neurosurgery and neurochecks were done every 4 hours.  --Patient was maintained on pain medications including p.o./IV hydromorphone, scheduled Tylenol and Lyrica 50 mg p.o. 3 times a day.  --Patient was also continued on PTA Robaxin 1000 mg every 6 hours as needed.  --MRI lumbar spine was completed, reviewed by Dr. Price who has discussed with patient regarding the findings explaining that his left L3-L4 lateral recesses were decompressed with postoperative changes.  There is no evidence of residual compression which explain his ongoing weakness.  --Thoracic spine MRI, left x-ray was also completed and came back inconclusive.  --Patient was also evaluated by general neurology  --Patient was also evaluated by general neurology who were also concerning for possible peroneal nerve neuropathy.  --Patient will benefit from outpatient EMG, to be ordered by PCP.  --Continue patient on current  medications.  --Patient has been evaluated by physical therapy and is discharge with outpatient physical therapy.        History of urinary retention during hospitalization  BPH  *Noted to have 500 ml PVR at [facility]  *Patient reports he always struggles with retention when he is hospitalized and unable to stand to urinate, has otherwise been urinating at his baseline at home     -- Patient was monitored closely, no issues with urinary retention.     Coronary artery disease with prior NSTEMI s/p MAE to mLAD 5/2021  Hypertension  Hyperlipidemia  Chronic venous insufficiency     -- Patient will be continued on PTA aspirin, amlodipine 10 mg p.o. daily, atorvastatin 40 mg at bedtime, carvedilol 12.5 mg p.o. twice daily, although ordered 25 mg twice daily, patient will be continued on his PTA clonidine patch, hydralazine, losartan and torsemide after discharge.       Chronic kidney disease, stage 2  Baseline creatinine 1.2-1.4. Creatinine 1.29 at OSH.   - Currently around baseline  - Avoid nephrotoxins     Diabetes mellitus, type 2, on long term insulin with diabetic neuropathy   HgbA1c 7.5% 6/14/24. Prior to admission reports taking glargine 80 units in the morning, 20 units in the evening, lispro 4 units per 15 grams of carbohydrates + sliding scale [needs med rec].    --Patient has been using 80 units of glargine in the morning which was given in hospital.  --Discussed with patient regarding asking PCP to update his PTA medication list as his PTA medication list does not reflect accurate dosing of his insulin.  --Discussed with patient regarding taking Lyrica 50 mg 3 times a day which would be helpful in current neuropathic pain in left lower extremity as above.     Depression with anxiety  -- Continue patient on PTA bupropion 300 mg every morning     Obstructive sleep apnea  - -Continue CPAP per home settings     GERD  -- Continue PTA PPI     Hypothyroidism  -- Continue PTA levothyroxine 50 mcg daily.      Gout  -- Continue PTA allopurinol 100 mg daily    Patient was seen and examined on the day of discharge , he is feeling well, does not have any complaints , I did review the discharge medications and instructions with the patient and plan for him to follow up with the PCP after the hospitalization .patient was in agreement , he is discharged in stable condition back to his home.      Consultations This Hospital Stay   NEUROSURGERY IP CONSULT  PHYSICAL THERAPY ADULT IP CONSULT  NEUROLOGY IP CONSULT    Code Status   Full Code    Time Spent on this Encounter   I, Rosa Allen MD, personally saw the patient today and spent greater than 30 minutes discharging this patient.       Rosa Allen MD  [facility]  [address] [address]  Phone: [phone]  Fax: [phone]  ______________________________________________________________________    Physical Exam   Vital Signs: Temp: 97.7  F (36.5  C) Temp src: Oral BP: (!) 168/75 Pulse: 64   Resp: 18 SpO2: 95 % O2 Device: None (Room air)    Weight: 292 lbs 1.75 oz    Physical Exam  Vitals and nursing note reviewed.   Constitutional:       Appearance: He is well-developed.   HENT:      Head: Normocephalic and atraumatic.   Eyes:      Pupils: Pupils are equal, round, and reactive to light.   Neck:      Thyroid: No thyromegaly.   Cardiovascular:      Rate and Rhythm: Normal rate and regular rhythm.      Heart sounds: Normal heart sounds.   Pulmonary:      Effort: Pulmonary effort is normal. No respiratory distress.      Breath sounds: Normal breath sounds.   Abdominal:      General: Bowel sounds are normal. There is no distension.      Palpations: Abdomen is soft.   Musculoskeletal:         General: No tenderness. Normal range of motion.      Cervical back: Normal range of motion and neck supple.   Skin:     General: Skin is warm and dry.   Neurological:      Mental Status: He is alert and oriented to person, place, and  time.   Psychiatric:         Behavior: Behavior normal.              Primary Care Physician   True Nogueira    Discharge Orders      Physical Therapy  Referral      Reason for your hospital stay    He was admitted to the hospital secondary to left leg discomfort, you have been evaluated by neurosurgery and neurology and have undergone extensive workup.  You have been recommended to undergo outpatient EMG studies.     Follow-up and recommended labs and tests     Follow up with primary care provider, True Nogueira, within 7 days for hospital follow- up.  No follow up labs or test are needed.  Patient will need outpatient EMG ordered.  Follow-up with neurosurgery and neurology as recommended.     Activity    Your activity upon discharge: activity as tolerated and no driving for today     Discharge Instructions    You were admitted to the hospital secondary to left lower extremity discomfort, you have undergone extensive workup and you have been evaluated by neurosurgery and neurology so far there is concern for possibility of L4 dermatomal weakness versus peroneal neuropathy.  You are advised to undergo EMG in an outpatient setting.  Your primary care physician should be able to order it on hospital follow-up.  You will benefit from taking Lyrica 3 times a day which should be able to help your symptoms.  Please go over all your medications with primary care physician as you seem to be taking more Lantus than what is documented in your prior to hospital admission medications.  Keep your follow-up appointment with neurosurgery.     Full Code     Diet    Follow this diet upon discharge: Orders Placed This Encounter      Moderate Consistent Carb (60 g CHO per Meal) Diet         Significant Results and Procedures   Results for orders placed or performed during the hospital encounter of 08/09/24   MR Thoracic Spine w/o Contrast    Narrative    EXAM: MR THORACIC SPINE W/O CONTRAST  LOCATION: [facility]  DATE: 8/10/2024    INDICATION: Left lower extremtiy weakness and numbness  COMPARISON: Chest CTA: 5/15/2021.  TECHNIQUE: Routine Thoracic Spine MRI without IV contrast.    FINDINGS:   Vertebral body heights are maintained. No suspicious focal marrow replacing lesions. No focal marrow edema. Mild dextrocurvature of the midthoracic spine. No substantial listhesis.    Multilevel degenerative disc disease involving the mid to lower thoracic spine with generally mild disc height loss with scattered areas of undulating endplate irregularity/Schmorl node formation. Scattered small ventrally and laterally directed   osteophytes are seen in the mid to lower thoracic spine, largest ventrally on the right at T9-T10. Multilevel facet arthropathy, most pronounced and moderate bilaterally at T10-T11, otherwise generally mild. Scattered areas of minimal posterior disc   bulging/tiny central zone disc protrusions are noted throughout the thoracic spine, most pronounced at T8-T9. There is prominent dorsal epidural lipomatosis throughout the thoracic spine. Spinal canal stenosis is greatest and mild at T8-T9. No high-grade   neural foraminal stenosis.    No cord signal abnormality.    No extraspinal abnormality.      Impression    IMPRESSION:    1.  Generally mild multilevel thoracic spondylosis with superimposed dorsal epidural lipomatosis. Spinal canal stenosis is greatest and mild at T8-T9. No high-grade neural foraminal stenosis.  2.  No cord signal abnormality.   MR Knee Left w/o Contrast    Narrative    EXAM: MR KNEE LEFT W/O CONTRAST  LOCATION: [facility]  DATE: 8/10/2024    INDICATION: Knee pain, x-ray negative, also weakness concerning for peroneal neuropathy, assess for compression.  COMPARISON: 8/9/2024 radiographs.  TECHNIQUE: Unenhanced.    FINDINGS:    MEDIAL COMPARTMENT:   -Meniscus: Normal.  -Cartilage: Normal.    LATERAL COMPARTMENT:  -Meniscus: Normal.   -Cartilage:  Normal.    PATELLOFEMORAL COMPARTMENT:   -Alignment: Patella midline. No subluxation or tilting.   -Cartilage: Normal.    CRUCIATE LIGAMENTS:   -ACL: Normal.  -PCL: Normal.    COLLATERAL LIGAMENTS:   -Medial collateral ligament: Superficial and deep fibers are normal.  -Lateral collateral ligament: Normal.    POSTEROMEDIAL CORNER:  -Distal semimembranosus tendon is normal.   -Pes anserine tendons are normal. Posteromedial corner complex ligaments are intact.    POSTEROLATERAL CORNER:   -Popliteal tendon is intact. No tendinopathy.  -Biceps femoris tendon and posterolateral corner complex ligaments are intact.    EXTENSOR MECHANISM:   -Quadriceps tendon: Normal.  -Patellar tendon: Normal.  -Patellofemoral ligaments and retinacula: Intact.    JOINT:   -Small effusion. No synovitis or loose bodies.    BONES:  -No fracture or concerning marrow replacing lesion.    SOFT TISSUES:   -No popliteal cyst. Mild global atrophy of the surrounding knee musculature as well as the proximal calf musculature. The neurovascular structures are intact.       Impression    IMPRESSION:  1.  Nothing to account for compressive neuropathy. No evidence of acute injury.    2.  Mild global atrophy of the knee and proximal calf musculature.       Discharge Medications   Current Discharge Medication List        CONTINUE these medications which have NOT CHANGED    Details   acetaminophen (TYLENOL) 500 MG tablet Take 2 tablets (1,000 mg) by mouth 3 times daily  Qty: 100 tablet, Refills: 0    Associated Diagnoses: Intractable low back pain      allopurinol (ZYLOPRIM) 100 MG tablet Take 100 mg by mouth daily as needed (elevated purines)      amLODIPine (NORVASC) 10 MG tablet Take 10 mg by mouth daily      aspirin 81 MG EC tablet Take 1 tablet (81 mg) by mouth daily      atorvastatin (LIPITOR) 40 MG tablet Take 40 mg by mouth daily      buPROPion (WELLBUTRIN XL) 300 MG 24 hr tablet Take 300 mg by mouth every morning      carvedilol (COREG) 25 MG  tablet Take 1 tablet (25 mg) by mouth 2 times daily (with meals)  Qty: 60 tablet, Refills: 0    Associated Diagnoses: Benign essential hypertension      SELLERS CONCENTRATE PO Take 2 tablets by mouth 2 times daily      cholecalciferol (VITAMIN D3) 1250 mcg (45399 units) capsule Take 1,250 mcg by mouth every 7 days Fridays      CHROMIUM PICOLINATE PO Take 1 tablet by mouth 2 times daily (before meals)      cloNIDine (CATAPRES-TTS1) 0.1 MG/24HR WK patch Place 1 patch onto the skin once a week Fridays      hydrALAZINE (APRESOLINE) 100 MG tablet Take 1 tablet (100 mg) by mouth every 8 hours  Qty: 90 tablet, Refills: 0    Associated Diagnoses: Benign essential hypertension      hydrOXYzine HCl (ATARAX) 10 MG tablet Take 1-2 tablets (10-20 mg) by mouth 2 times daily as needed for itching or anxiety  Qty: 40 tablet, Refills: 0    Associated Diagnoses: History of spinal surgery      !! insulin glargine (LANTUS PEN) 100 UNIT/ML pen Inject 15 Units Subcutaneous every morning  Qty: 15 mL, Refills: 0    Comments: If Lantus is not covered by insurance, may substitute Basaglar or Semglee or other insulin glargine product per insurance preference at same dose and frequency.    Associated Diagnoses: Type 2 diabetes mellitus with other specified complication, without long-term current use of insulin (H)      !! insulin glargine (LANTUS PEN) 100 UNIT/ML pen Inject 20 Units subcutaneously at bedtime      insulin lispro (HUMALOG KWIKPEN) 100 UNIT/ML (1 unit dial) KWIKPEN Inject 0-12 Units subcutaneously 3 times daily (before meals) Uses 4 units/15g carbohydrates. Correction dose: 4units per 25 over 100mg/dL      L-GLUTAMINE PO Take 5 g by mouth 2 times daily      Levomefolic Acid (5-MTHF) 1 MG CAPS Take 1-7 mg by mouth daily      levothyroxine (SYNTHROID/LEVOTHROID) 50 MCG tablet Take 50 mcg by mouth daily      losartan (COZAAR) 50 MG tablet Take 50 mg by mouth daily      MAGNESIUM GLYCINATE PO Take 2 capsules by mouth 2 times daily       methocarbamol (ROBAXIN) 500 MG tablet Take 2 tablets (1,000 mg) by mouth every 6 hours as needed for muscle spasms  Qty: 60 tablet, Refills: 0    Associated Diagnoses: Type 2 diabetes mellitus with other specified complication, without long-term current use of insulin (H)      nitroGLYcerin (NITROSTAT) 0.4 MG sublingual tablet Place 0.4 mg under the tongue every 5 minutes as needed for chest pain For chest pain place 1 tablet under the tongue every 5 minutes for 3 doses. If symptoms persist 5 minutes after 1st dose call 911.      omeprazole (PRILOSEC) 20 MG DR capsule Take 20 mg by mouth daily      pregabalin (LYRICA) 50 MG capsule Take 1 capsule (50 mg) by mouth 3 times daily  Qty: 90 capsule, Refills: 0    Associated Diagnoses: Intractable low back pain      pyridOXINE (VITAMIN B-6) 50 MG tablet Take 50 mg by mouth daily      red yeast rice 600 MG CAPS Take 600 mg by mouth 2 times daily      senna-docusate (SENOKOT-S/PERICOLACE) 8.6-50 MG tablet Take 1 tablet by mouth 2 times daily as needed for constipation  Qty: 40 tablet, Refills: 0    Associated Diagnoses: History of spinal surgery      sildenafil (REVATIO) 20 MG tablet Take 100 mg by mouth daily as needed      tirzepatide (MOUNJARO) 10 MG/0.5ML pen Inject 10 mg subcutaneously every 7 days Fridays      torsemide (DEMADEX) 20 MG tablet Take 40 mg by mouth daily      valACYclovir (VALTREX) 1000 mg tablet Take 1,000 mg by mouth 2 times daily as needed X 1 day for cold sores       !! - Potential duplicate medications found. Please discuss with provider.        Allergies   Allergies   Allergen Reactions    Vancomycin Itching     riddle    Codeine Itching     Can take with benadryl    Morphine Itching     Can take with benadryl

## 2024-08-11 NOTE — PROGRESS NOTES
[facility]  Neurology Daily Note      Admission Date:8/9/2024   Date of service: 08/11/2024   Hospital Day: 3                                                   Assessment and Plan:   #. #.  left leg weakness  -- Leg weakness fits with a L4 dermatomal weakness with numbness also fitting that pattern.  I suspect most of his weakness is secondary to his atrophy noted on exam that likely occurred from before surgery L4 injury.  Encouraged physical therapy to work on improvement of the left leg strength.  However, in addition he does have eversion weakness and reportedly some foot drop on occasion, inversion seems relatively strong.  Other areas of numbness could fit with a peroneal neuropathy versus L5 radiculopathy.  MRI did show slight enhancement surrounding the right L5 nerve root which could represent scar tissue, no significant abnormality noted the left L5 nerve root.   No significant dorsiflexion weakness on exam.  With his knee pain will want to MRI his knee as well as evaluate his peroneal nerve.  Would recommend EMG in the outpatient setting.  -MRI knee without contrast negative for any compression or abnormality to explain pain.   -EMG of the left leg, unable to done over the weekend, could be done in the outpatient setting.  Patient would like this done at [facility].  Please place order upon discharge.  -Recommend neurology follow-up, patient wants to follow-up with [facility].  Please place neurology referral to [facility] for the patient upon discharge  -PT while admitted recommend PT in the outpatient setting    General Neurology service will sign off as no additional neurologic evaluation or management from our service is required at this time.  Please contact General Neurology service if questions related to neurologic status or follow up needed during this hospitalization.        Interval History:   Patient is feeling better today, he was able to see PT and got a brace for his  knee and leg.  He states he is able to walk with it.  No new symptoms.         Medications:   Scheduled Meds:  Current Facility-Administered Medications   Medication Dose Route Frequency Provider Last Rate Last Admin    acetaminophen (TYLENOL) tablet 1,000 mg  1,000 mg Oral TID Rosa Allen MD   1,000 mg at 08/11/24 0832    allopurinol (ZYLOPRIM) tablet 100 mg  100 mg Oral Daily Rosa Allen MD   100 mg at 08/11/24 0833    amLODIPine (NORVASC) tablet 10 mg  10 mg Oral Daily Rosa Allen MD   10 mg at 08/11/24 0833    atorvastatin (LIPITOR) tablet 40 mg  40 mg Oral Daily Rosa Allen MD   40 mg at 08/11/24 0833    buPROPion (WELLBUTRIN XL) 24 hr tablet 300 mg  300 mg Oral QAM Rosa Allen MD   300 mg at 08/11/24 0833    carvedilol (COREG) tablet 12.5 mg  12.5 mg Oral BID w/meals Rosa Allen MD   12.5 mg at 08/11/24 0833    cloNIDine (CATAPRES-TTS1) 0.1 MG/24HR WK patch 1 patch  1 patch Transdermal Weekly Rosa Allen MD   1 patch at 08/10/24 1340    And    cloNIDine (CATAPRES-TTS1) Patch in Place   Transdermal Q8H RANJANA Rosa Allen MD        hydrALAZINE (APRESOLINE) tablet 100 mg  100 mg Oral BID Rosa Allen MD   100 mg at 08/11/24 0833    insulin aspart (NovoLOG) injection (RAPID ACTING)   Subcutaneous TID w/meals All Connell MD   16 Units at 08/10/24 1814    insulin aspart (NovoLOG) injection (RAPID ACTING)  1-7 Units Subcutaneous TID AC Rosa Allen MD   1 Units at 08/11/24 0846    insulin aspart (NovoLOG) injection (RAPID ACTING)  1-5 Units Subcutaneous At Bedtime Rosa Allen MD        insulin glargine (LANTUS PEN) injection 80 Units  80 Units Subcutaneous QAM AC All Connell MD   80 Units at 08/10/24 0906    levothyroxine (SYNTHROID/LEVOTHROID) tablet 50 mcg  50 mcg Oral Daily Rosa Allen MD   50 mcg at 08/11/24 0647    losartan (COZAAR) tablet 50 mg  50 mg Oral Daily AllenRosa nelson MD   50 mg at 08/11/24 0833    pantoprazole (PROTONIX) EC tablet 40 mg  40 mg Oral Daily Allen,  MD Rosa   40 mg at 08/11/24 0833    pregabalin (LYRICA) capsule 50 mg  50 mg Oral TID AllenRosa MD   50 mg at 08/11/24 0833    sodium chloride (PF) 0.9% PF flush 3 mL  3 mL Intracatheter Q8H All Connell MD   3 mL at 08/11/24 0837     PRN Meds:   Current Facility-Administered Medications   Medication Dose Route Frequency Provider Last Rate Last Admin    acetaminophen (TYLENOL) tablet 650 mg  650 mg Oral Q4H PRN All Connell MD        Or    acetaminophen (TYLENOL) Suppository 650 mg  650 mg Rectal Q4H PRN All Connell MD        benzocaine-menthol (CHLORASEPTIC) 6-10 MG lozenge 1 lozenge  1 lozenge Buccal Q1H PRAll Baker MD        glucose gel 15-30 g  15-30 g Oral Q15 Min PRAll Baker MD        Or    dextrose 50 % injection 25-50 mL  25-50 mL Intravenous Q15 Min PRAll Baker MD        Or    glucagon injection 1 mg  1 mg Subcutaneous Q15 Min PRAll Baker MD        diphenhydrAMINE (BENADRYL) capsule 25 mg  25 mg Oral Q6H PRN All Connell MD   25 mg at 08/10/24 1636    hydrALAZINE (APRESOLINE) tablet 10 mg  10 mg Oral Q4H PRAll Baker MD        Or    hydrALAZINE (APRESOLINE) injection 10 mg  10 mg Intravenous Q4H PRAll Baker MD        HYDROmorphone (DILAUDID) injection 0.2 mg  0.2 mg Intravenous Q2H PRAll Baker MD        HYDROmorphone (DILAUDID) injection 0.4 mg  0.4 mg Intravenous Q2H PRAll Baker MD   0.4 mg at 08/10/24 2335    HYDROmorphone (DILAUDID) tablet 2 mg  2 mg Oral Q4H All Shore MD   2 mg at 08/10/24 1823    HYDROmorphone (DILAUDID) tablet 4 mg  4 mg Oral Q4H PRN All Connell MD   4 mg at 08/10/24 0230    hydrOXYzine HCl (ATARAX) tablet 25 mg  25 mg Oral BID PRN Rosa Allen MD        lidocaine (LMX4) cream   Topical Q1H PRN All Connell MD        lidocaine 1 % 0.1-1 mL  0.1-1 mL Other Q1H PRN All Connell  MD Robbie        methocarbamol (ROBAXIN) tablet 1,000 mg  1,000 mg Oral Q6H PRN Rosa Allen MD   1,000 mg at 08/10/24 2335    naloxone (NARCAN) injection 0.2 mg  0.2 mg Intravenous Q2 Min PRN All Connell MD        Or    naloxone (NARCAN) injection 0.4 mg  0.4 mg Intravenous Q2 Min PRN All Connell MD        Or    naloxone (NARCAN) injection 0.2 mg  0.2 mg Intramuscular Q2 Min PRN All Connell MD        Or    naloxone (NARCAN) injection 0.4 mg  0.4 mg Intramuscular Q2 Min PRN All Connell MD        nitroGLYcerin (NITROSTAT) sublingual tablet 0.4 mg  0.4 mg Sublingual Q5 Min PRN Rosa Allen MD        ondansetron (ZOFRAN ODT) ODT tab 4 mg  4 mg Oral Q6H PRN All Connell MD        Or    ondansetron (ZOFRAN) injection 4 mg  4 mg Intravenous Q6H PRN All Connell MD        polyethylene glycol (MIRALAX) Packet 17 g  17 g Oral BID PRN All Connell MD        prochlorperazine (COMPAZINE) injection 10 mg  10 mg Intravenous Q6H PRAll Baker MD        Or    prochlorperazine (COMPAZINE) tablet 10 mg  10 mg Oral Q6H PRAll Baker MD        Or    prochlorperazine (COMPAZINE) suppository 25 mg  25 mg Rectal Q12H PRN All Connell MD        sendemetrius-docusate (SENOKOT-S/PERICOLACE) 8.6-50 MG per tablet 1 tablet  1 tablet Oral BID PRAll Baker MD        Or    senna-docusate (SENOKOT-S/PERICOLACE) 8.6-50 MG per tablet 2 tablet  2 tablet Oral BID PRAll Baker MD        sodium chloride (PF) 0.9% PF flush 3 mL  3 mL Intracatheter q1 min prn All Connell MD               Physical Exam:   Vitals: Temp: 97.7  F (36.5  C) Temp src: Oral BP: (!) 168/75 Pulse: 64   Resp: 18 SpO2: 95 % O2 Device: None (Room air)    Vital Signs with Ranges: Temp:  [97.7  F (36.5  C)-98.4  F (36.9  C)] 97.7  F (36.5  C)  Pulse:  [58-64] 64  Resp:  [18] 18  BP: (134-168)/(72-78) 168/75  SpO2:  [95 %-98 %] 95 %    General  Appearance:  No acute distress  Neuro:           Motor: In the left lower extremity he does have atrophy of his quadriceps with 4/5 hip flexion, 4-/5 knee extension, but also some 4+/5 knee flexion, 5-/5 dorsiflexion, 4-/5 eversion, 5/5 inversion.   Reflexes: 0/4 left patella and bilateral Achilles reflexes.  1/4 right patella.  Plantar signs normal.  Sensory: Decreased pinprick along the left thigh and knee, lateral calf and dorsum of the foot.    Extremities: No clubbing, no cyanosis, no edema       Data:   ROUTINE IP LABS (Last 3results)  CBC RESULTS:     Recent Labs   Lab Test 08/10/24  0832 05/25/24  0940 04/25/19  1339   WBC 6.2 8.5 12.3*   RBC 4.26* 5.03 4.87   HGB 12.7* 14.6 15.1   HCT 37.0* 43.8 44.2    349 283     Basic Metabolic Panel:  Recent Labs   Lab Test 08/11/24  0846 08/11/24  0500 08/10/24  2131 08/10/24  0836 08/10/24  0832 07/11/24  1144 07/11/24  0934 05/30/24  0700 05/30/24  0616 05/29/24  0631 05/29/24  0614   NA  --   --   --   --  136  --   --   --  139  --  137   POTASSIUM  --   --   --   --  4.0  --   --   --  4.6  --  5.1   CHLORIDE  --   --   --   --  103  --   --   --  107  --  106   CO2  --   --   --   --  24  --   --   --  21*  --  21*   BUN  --   --   --   --  27.6*  --   --   --  48.0*  --  46.6*   CR  --   --   --   --  1.20*  --  1.10  --  1.27*  --  1.24*   * 135* 101*   < > 170*   < >  --    < > 58*   < > 197*   BELA  --   --   --   --  8.8  --   --   --  9.3  --  9.4    < > = values in this interval not displayed.     Liver panel:  No lab results found.  Thyroid Panel:No lab results found.   Vitamin B12: No lab results found.   Ammonia: No lab results found.     IMAGING:   MRI lumbar with without contrast 8/9/24  FINDINGS: Alignment is significant for multilevel subtle grade 1 spondylolisthesis. Bone marrow demonstrates minimal degenerative endplate changes surrounding the L3-4, L4-5, L5-S1 disc joints. No high-grade marrow edema. Left laminectomy changes at L3-4.  Right laminectomy changes at L4-5 and L5-S1. Conus medullaris is unremarkable, terminating at the level of the mid L1 vertebral body. Cauda equina is unremarkable. Presumed benign right renal cysts. Mild bilateral sacroiliac joint degenerative change.     L1-L2: Disc height maintained. No herniation. Normal facet joints. No foraminal or spinal canal stenosis.     L2-L3: Disc height maintained. No herniation. Mild bilateral facet arthropathy. No foraminal or spinal canal stenosis.     L3-L4: Left laminectomy changes. Small volume fluid signal is present at the laminectomy site. Mild disc height loss. Disc bulge with central disc extrusion extending slightly inferiorly within the intradural space possibly abutting the bilateral traversing L4 nerve roots within the lateral recesses (series 9, image 28), decreased in size compared to the prior. Nonspecific T1 hypointense enhancing signal within the left lateral recess encasing the traversing left L4 nerve root (series 10, image 75; series 14, image 87).     L4-L5: Right laminectomy changes. Severe disc height loss. Disc bulge possibly abutting the bilateral traversing L5 nerve roots within the lateral recesses (series 9, image 34). Mild bilateral facet arthropathy. No foraminal stenosis. Mild spinal canal stenosis. Slight asymmetric enhancement surrounding the traversing right L5 nerve root could represent scar tissue (series 15, image 3).     L5-S1: Severe disc height loss. Disc bulge with right central protrusion versus extrusion which may abut the traversing right S1 nerve root within the lateral recess.     IMPRESSION:   1. Left laminectomy changes at L3-4.   2.  T1 hypointense enhancing tissue within the left lateral recess at L3-4 encasing the traversing left L4 nerve root, presumably postoperative changes, although superimposed extruded disc material/inflammatory change cannot be excluded. Infection not excluded.   3.  Right laminectomy changes at L4-5 and L5-S1.    4.  Other degenerative change and stenoses as detailed.     MRI left knee without contrast 8/9/2024  IMPRESSION:  1.  Nothing to account for compressive neuropathy. No evidence of acute injury.     2.  Mild global atrophy of the knee and proximal calf musculature.        TIME     36 minutes Evaluation/management time     Mar Vo M.D.  Neurologist  [facility]  Office [phone]

## 2024-08-12 ENCOUNTER — PATIENT OUTREACH (OUTPATIENT)
Dept: CARE COORDINATION | Facility: CLINIC | Age: 59
End: 2024-08-12

## 2024-08-12 NOTE — PROGRESS NOTES
[facility]    Background: Transitional Care Management program identified per system criteria and reviewed by [facility] team for possible outreach.    Assessment: Upon chart review, CCR Team member will not proceed with patient outreach related to this episode of Transitional Care Management program due to reason below:    Patient has a follow up appointment with an appropriate provider today for hospital discharge    Patient has an appointment today with an MD in Neurological Surgery from [facility]. Patient's appointment today is appropriate for ED/Hospital discharge and follow-up. No [facility] outreach call needed at this time.    Plan: Transitional Care Management episode addressed appropriately per reason noted above.      ABDI Nielson  [phone]  [facility]     *Connected Care Resource Team does NOT follow patient ongoing. Referrals are identified based on internal discharge reports and the outreach is to ensure patient has an understanding of their discharge instructions.

## 2024-08-19 DIAGNOSIS — R29.898 LEFT LEG WEAKNESS: ICD-10-CM

## 2024-08-19 DIAGNOSIS — M54.16 LUMBAR RADICULOPATHY: Primary | ICD-10-CM

## 2024-08-22 ENCOUNTER — OFFICE VISIT (OUTPATIENT)
Dept: PHYSICAL MEDICINE AND REHAB | Facility: CLINIC | Age: 59
End: 2024-08-22
Attending: NEUROLOGICAL SURGERY
Payer: COMMERCIAL

## 2024-08-22 DIAGNOSIS — R29.898 LEFT LEG WEAKNESS: ICD-10-CM

## 2024-08-22 DIAGNOSIS — M54.16 LUMBAR RADICULOPATHY: ICD-10-CM

## 2024-08-22 PROCEDURE — 95886 MUSC TEST DONE W/N TEST COMP: CPT | Performed by: PHYSICAL MEDICINE & REHABILITATION

## 2024-08-22 PROCEDURE — 95885 MUSC TST DONE W/NERV TST LIM: CPT | Mod: XS | Performed by: PHYSICAL MEDICINE & REHABILITATION

## 2024-08-22 PROCEDURE — 95909 NRV CNDJ TST 5-6 STUDIES: CPT | Performed by: PHYSICAL MEDICINE & REHABILITATION

## 2024-08-22 NOTE — PATIENT INSTRUCTIONS
Thank you for choosing the [facility] for your EMG testing.    The ordering provider will receive your final EMG results within the next few days.  Please follow up with your provider for the results and further treatment recommendations.

## 2024-08-22 NOTE — PROGRESS NOTES
[facility]  [address] [place] [address] [address]  [place] [address]  Office: [phone] Fax: [phone]    Electromyography and Nerve Conduction Study Report        Indication:  Patient presents at the request of Dr. Dom Price for left lower extremity EMG.  He has history of left lower extremity pain paresthesias and weakness of the hip flexors knee extensors with paresthesias to the anterior left shin.  Recent hemilaminectomy.  History of diabetes mellitus with neuropathy.  On exam, he has decree sensation to light touch left medial malleolus more so than on the right.  0 patellar and Achilles reflexes bilaterally.  3/5 strength left hip flexors knee extensors, 5/5 knee flexors ankle plantar flexors dorsiflexors and left EHL.  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        Pt Exam Discussion (Communication Barriers):  Electromyography and nerve conduction testing, including associated discomfort, risks, benefits, and alternatives was discussed with the patient prior to the procedure.  No learning/ communication barriers; patient verbalized understanding of procedure.  Informed consent was obtained.           Pt Assessment:  Testing was successfully completed; patient tolerated testing well.       Blood Thinners: ASA Skin Temperature: Warmed 31.4                   EMG/NCS  results:     Nerve Conduction Studies  Motor Sites      Segment Distal Latency Neg. Amp CV F-Latency F-Estimate Comment   Site  (ms) (mV) (m/s) (ms) (ms)    Left Fibular (EDB) Motor   Ankle Ankle-EDB 5.1 0.08       Fib Head Fib Head-Ankle 15.2 0.09 34      Knee Knee-Ankle 17.7 *0.05 *34      Left Fibular (TA) Motor   Fib Head Fib Head-Tib anterior 1.75 3.0       Knee Knee-Fib Head 4.4 *3.1 *37      Left Tibial (AH) Motor   Ankle Ankle-AH *NR *NR       Right Tibial (AH) Motor   Ankle Ankle-AH *NR *NR         Sensory Sites      Onset Lat Peak  Lat Amp CV Comment   Site (ms) (ms) ( V) (m/s)    Left Sural Sensory   B-Ankle *NR *NR *NR *NR    Right Sural Sensory   B-Ankle *NR *NR *NR *NR        NCS Waveforms:    Motor                Sensory           Electromyography     Side Muscle Nerve Root Ins Act Fibs Psw Fasc Recrt Dur Amp Poly Comment   Left AntTibialis Dp Br Fibular L4-5 *Incr *2+ *2+ Nml Nml *>12ms *Incr *1+    Left Gastroc Tibial S1-2 *Incr *1+ *1+ Nml Nml Nml Nml 0    Left Fibularis Long Sup Br Fibular L5-S1 Nml Nml Nml Nml Nml Nml Nml 0    Left VastusLat Femoral L2-4 Nml Nml Nml Nml Nml Nml Nml 0    Left AdductorLong Obturator L2-4 *Incr *1+ *1+ Nml Nml Nml Nml 0    Left Iliopsoas Femoral L2-3 Nml Nml Nml Nml Nml Nml Nml 0    Left RectFemoris Femoral L2-4 Nml Nml Nml Nml Nml Nml Nml 0    Left TensorFascLat SupGluteal L4-5, S1 Nml Nml Nml Nml Nml Nml Nml 0    Left GluteusMax InfGluteal L5-S2 Nml Nml Nml Nml Nml Nml Nml 0    Right AntTibialis Dp Br Fibular L4-5 Nml Nml Nml Nml Nml Nml Nml 0    Right Gastroc Tibial S1-2 *Incr *1+ *1+ Nml Nml Nml Nml 0          Comment NCS: Abnormal study  1.  Absent bilateral sural SNAPs.  2.  Absent bilateral tibial CMAP's today H.  3.  Diffuse low amplitude left peroneal CMAP to the EDB without amplitude drop or slowing across the fibular neck.  4.  Normal latency and amplitude of the left peroneal CMAP to the tibialis anterior with borderline conduction velocity.    Comment EMG: Abnormal study  Increased insertional activity with positive waves and fibrillation potentials of the left tibialis anterior and adductor longus with prolonged motor unit potential duration and a few polyphasic units of the tibialis anterior.  Remainder left lower extremity needle EMG with the exception of left medial gastroc as mentioned below.  Lumbar paraspinals not tested secondary to prior lumbar surgery.  Increased insertional activity with a few positive waves and fibrillation potentials bilateral medial gastrocnemius muscles.   Normal left tibialis anterior.    Interpretation: Abnormal study: There is electrodiagnostic evidence of:    1.  Electrodiagnostic findings are consistent with a left L4 radiculopathy, active with some signs of reinnervation.  There is a somewhat patchy distribution of denervation with normal needle EMG of the femoral nerve innervated muscles.  I cannot completely exclude a lumbar plexopathy such as diabetic amyotrophy, but clinical correlation is recommended.    2.  Electrodiagnostic findings are consistent with but not confirmatory for a severe sensorimotor peripheral polyneuropathy, predominantly axonal which would be consistent with his history of diabetes mellitus.        The testing was completed in its entirety by the physician.      It was our pleasure caring for your patient today, if there any questions or concerns please do not hesitate to contact us.

## 2024-08-22 NOTE — LETTER
8/22/2024      Mark Milton Traaseth  [address] [facility] [address]      Dear Colleague,    Thank you for referring your patient, Mark Milton Traaseth, to the [facility]. Please see a copy of my visit note below.    [facility]  [address] [place] [address] [address]  [place] [address]  Office: [phone] Fax: [phone]    Electromyography and Nerve Conduction Study Report        Indication:  Patient presents at the request of Dr. Dom Price for left lower extremity EMG.  He has history of left lower extremity pain paresthesias and weakness of the hip flexors knee extensors with paresthesias to the anterior left shin.  Recent hemilaminectomy.  History of diabetes mellitus with neuropathy.  On exam, he has decree sensation to light touch left medial malleolus more so than on the right.  0 patellar and Achilles reflexes bilaterally.  3/5 strength left hip flexors knee extensors, 5/5 knee flexors ankle plantar flexors dorsiflexors and left EHL.  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        Pt Exam Discussion (Communication Barriers):  Electromyography and nerve conduction testing, including associated discomfort, risks, benefits, and alternatives was discussed with the patient prior to the procedure.  No learning/ communication barriers; patient verbalized understanding of procedure.  Informed consent was obtained.           Pt Assessment:  Testing was successfully completed; patient tolerated testing well.       Blood Thinners: ASA Skin Temperature: Warmed 31.4                   EMG/NCS  results:     Nerve Conduction Studies  Motor Sites      Segment Distal Latency Neg. Amp CV F-Latency F-Estimate Comment   Site  (ms) (mV) (m/s) (ms) (ms)    Left Fibular (EDB) Motor   Ankle Ankle-EDB 5.1 0.08       Fib Head Fib Head-Ankle 15.2 0.09 34      Knee Knee-Ankle 17.7 *0.05 *34      Left  Fibular (TA) Motor   Fib Head Fib Head-Tib anterior 1.75 3.0       Knee Knee-Fib Head 4.4 *3.1 *37      Left Tibial (AH) Motor   Ankle Ankle-AH *NR *NR       Right Tibial (AH) Motor   Ankle Ankle-AH *NR *NR         Sensory Sites      Onset Lat Peak Lat Amp CV Comment   Site (ms) (ms) ( V) (m/s)    Left Sural Sensory   B-Ankle *NR *NR *NR *NR    Right Sural Sensory   B-Ankle *NR *NR *NR *NR        NCS Waveforms:    Motor                Sensory           Electromyography     Side Muscle Nerve Root Ins Act Fibs Psw Fasc Recrt Dur Amp Poly Comment   Left AntTibialis Dp Br Fibular L4-5 *Incr *2+ *2+ Nml Nml *>12ms *Incr *1+    Left Gastroc Tibial S1-2 *Incr *1+ *1+ Nml Nml Nml Nml 0    Left Fibularis Long Sup Br Fibular L5-S1 Nml Nml Nml Nml Nml Nml Nml 0    Left VastusLat Femoral L2-4 Nml Nml Nml Nml Nml Nml Nml 0    Left AdductorLong Obturator L2-4 *Incr *1+ *1+ Nml Nml Nml Nml 0    Left Iliopsoas Femoral L2-3 Nml Nml Nml Nml Nml Nml Nml 0    Left RectFemoris Femoral L2-4 Nml Nml Nml Nml Nml Nml Nml 0    Left TensorFascLat SupGluteal L4-5, S1 Nml Nml Nml Nml Nml Nml Nml 0    Left GluteusMax InfGluteal L5-S2 Nml Nml Nml Nml Nml Nml Nml 0    Right AntTibialis Dp Br Fibular L4-5 Nml Nml Nml Nml Nml Nml Nml 0    Right Gastroc Tibial S1-2 *Incr *1+ *1+ Nml Nml Nml Nml 0          Comment NCS: Abnormal study  1.  Absent bilateral sural SNAPs.  2.  Absent bilateral tibial CMAP's today H.  3.  Diffuse low amplitude left peroneal CMAP to the EDB without amplitude drop or slowing across the fibular neck.  4.  Normal latency and amplitude of the left peroneal CMAP to the tibialis anterior with borderline conduction velocity.    Comment EMG: Abnormal study  Increased insertional activity with positive waves and fibrillation potentials of the left tibialis anterior and adductor longus with prolonged motor unit potential duration and a few polyphasic units of the tibialis anterior.  Remainder left lower extremity needle EMG with the  exception of left medial gastroc as mentioned below.  Lumbar paraspinals not tested secondary to prior lumbar surgery.  Increased insertional activity with a few positive waves and fibrillation potentials bilateral medial gastrocnemius muscles.  Normal left tibialis anterior.    Interpretation: Abnormal study: There is electrodiagnostic evidence of:    1.  Electrodiagnostic findings are consistent with a left L4 radiculopathy, active with some signs of reinnervation.  There is a somewhat patchy distribution of denervation with normal needle EMG of the femoral nerve innervated muscles.  I cannot completely exclude a lumbar plexopathy such as diabetic amyotrophy, but clinical correlation is recommended.    2.  Electrodiagnostic findings are consistent with but not confirmatory for a severe sensorimotor peripheral polyneuropathy, predominantly axonal which would be consistent with his history of diabetes mellitus.        The testing was completed in its entirety by the physician.      It was our pleasure caring for your patient today, if there any questions or concerns please do not hesitate to contact us.      Again, thank you for allowing me to participate in the care of your patient.        Sincerely,        Jluis Hickey, DO

## 2024-08-26 ENCOUNTER — OFFICE VISIT (OUTPATIENT)
Dept: NEUROSURGERY | Facility: CLINIC | Age: 59
End: 2024-08-26
Payer: COMMERCIAL

## 2024-08-26 VITALS
DIASTOLIC BLOOD PRESSURE: 72 MMHG | HEIGHT: 74 IN | OXYGEN SATURATION: 97 % | SYSTOLIC BLOOD PRESSURE: 151 MMHG | HEART RATE: 65 BPM | BODY MASS INDEX: 37.6 KG/M2 | WEIGHT: 293 LBS

## 2024-08-26 DIAGNOSIS — M54.16 LUMBAR RADICULOPATHY: ICD-10-CM

## 2024-08-26 DIAGNOSIS — G62.9 POLYNEUROPATHY: Primary | ICD-10-CM

## 2024-08-26 DIAGNOSIS — R29.898 LEFT LEG WEAKNESS: ICD-10-CM

## 2024-08-26 PROCEDURE — 99024 POSTOP FOLLOW-UP VISIT: CPT | Performed by: NEUROLOGICAL SURGERY

## 2024-08-26 ASSESSMENT — PAIN SCALES - GENERAL: PAINLEVEL: NO PAIN (0)

## 2024-08-26 NOTE — NURSING NOTE
"Mark Milton Traaseth is a 59 year old male who presents for:  Chief Complaint   Patient presents with    Surgical Followup     6 week lumbar postop. Patient reports pain in knee about a level 5, only numbness in back.        Initial Vitals:  BP (!) 151/72   Pulse 65   Ht 6' 2\" (1.88 m)   Wt 293 lb (132.9 kg)   SpO2 97%   BMI 37.62 kg/m   Estimated body mass index is 37.62 kg/m  as calculated from the following:    Height as of this encounter: 6' 2\" (1.88 m).    Weight as of this encounter: 293 lb (132.9 kg). Body surface area is 2.63 meters squared. BP completed using cuff size: large  No Pain (0)        Ang Landry    "

## 2024-08-26 NOTE — PROGRESS NOTES
"NEUROSURGERY FOLLOWUP  NOTE    Mark Milton Traaseth comes today in f/u following hospital admission at the [facility] from 8/9/2024 to 8/11/2020 for when he presented with left lower extremity proximal primary quadriceps weakness.  Patient is s/p 7/10/2020 for left L3-4 lateral recess decompression and microlumbar discectomy    Patient is in the clinic today with his spouse with EMG/NCV studies.    Patient reports continuing left proximal leg weakness with no improvement since the hospitalization.  However he reports minimal pain in the distribution of L4 dermatome.  He reports tingling around the knee and radiating proximal up to mid thigh and distally up to the ankle.  He denies weakness involving ankle.  Patient is able to ambulate with a walker.  He denies any bladder or bowel symptoms.    8/22/2024: EMG/NCV studies:  Interpretation: Abnormal study: There is electrodiagnostic evidence of:     1.  Electrodiagnostic findings are consistent with a left L4 radiculopathy, active with some signs of reinnervation.  There is a somewhat patchy distribution of denervation with normal needle EMG of the femoral nerve innervated muscles.  I cannot completely exclude a lumbar plexopathy such as diabetic amyotrophy, but clinical correlation is recommended.     2.  Electrodiagnostic findings are consistent with but not confirmatory for a severe sensorimotor peripheral polyneuropathy, predominantly axonal which would be consistent with his history of diabetes mellitus.      PHYSICAL EXAM:   Constitutional: BP (!) 151/72   Pulse 65   Ht 6' 2\" (1.88 m)   Wt 293 lb (132.9 kg)   SpO2 97%   BMI 37.62 kg/m       Mental Status: A & O in no acute distress.  Affect is appropriate.  Speech is fluent.  Recent and remote memory are intact.  Attention span and concentration are normal.     Motor: No pronator drift of upper extremity.   Normal bulk and tone all muscle groups of upper and lower extremities.       Delt Bi Tri Hand " Flex/  Ext Iliopsoas Quadriceps Tibialis Anterior EHL Gastroc     C5 C6 C7 C8/T1 L2 L3 L4 L5 S1   R 5 5 5 5 5 5 5 5 5   L 5 5 5 5 -4 3 5 5 5        Sensory: Sensation decreased to touch along the medial aspect of the leg.     Coordination: Patient ambulates with a walker.  Impaired tandem gait.     Reflexes; supinator, biceps, triceps, knee/ ankle jerk intact.  No hoffmans/   no babinski/ clonus.    Incision: Healed well    IMAGING:   I personally reviewed all radiographic images and agree with the radiology report of left L3-4 hemilaminotomy with lateral recess decompression postoperative changes.      8/9/2024 MRI lumbar spine:  IMPRESSION:   1. Left laminectomy changes at L3-4.   2.  T1 hypointense enhancing tissue within the left lateral recess at L3-4 encasing the traversing left L4 nerve root, presumably postoperative changes, although superimposed extruded disc material/inflammatory change cannot be excluded. Infection not excluded.   3.  Right laminectomy changes at L4-5 and L5-S1.   4.  Other degenerative change and stenoses as detailed.      CONSULTATION ASSESSMENT AND PLAN:    Mark Milton Traaseth comes today in f/u following hospital admission at the [facility] from 8/9/2024 to 8/11/2020 for when he presented with left lower extremity proximal primary quadriceps weakness. Patient is s/p 7/10/2020 for left L3-4 lateral recess decompression and microlumbar discectomy.     Patient is in the clinic today with his spouse.  He reports no significant change in his left proximal leg weakness since the discharge.  He denies any new onset neurological symptoms during his clinic today since the discharge.  He is scheduled to start physical therapy this week.    I discussed the EMG/NCV findings of left L4 radiculopathy with signs of reinnervation which could be postoperative following decompression.  I also explained to him that diabetic amyotrophy and lumbar plexopathy cannot be ruled out.  He also has  severe sensorimotor peripheral polyneuropathy predominantly axonal given his history of diabetes mellitus (last Hb1Ac: 7.1%).    I also explained the postoperative MRI lumbar spine done on 8/9/2024 and showed him the images.  I explained to him that there is satisfactory decompression at L3-4 compared to his preoperative MRI.    I also explained to him since patient reports improvement in pain following the decompression with ongoing weakness I am not sure that repeat decompression is likely to help him with his symptoms.    I also discussed the role of repeat decompression and decompression with fusion.  However since he has degenerative disc disease at lumbar 4 lumbar 5 and lumbar 5 sacral 1, he is likely to need multilevel fusion.Patient is also not willing for any surgical intervention at this point.  He would like to try physical therapy prior to any intervention.  He is scheduled to start physical therapy this week.    I will also refer him to the spine medicine team and will also discuss his case in the spine case conference this week.  I will also refer him to the neurologist for their opinion regarding peripheral neuropathy.  I will follow him in 6 weeks or so to reassess his clinical progress and further management.    Patient agreed with the plan.  All the questions were answered and patient sounded understanding.  He can contact us if there are any further questions or concerns or worsening neurological deficits.I spent more than 20 minutes in this apt, examining the pt, reviewing the scans, reviewing notes from chart, discussing treatment options with risks and benefits and coordinating care. This note was created in part by the use of Dragon voice recognition system. Inadvertent grammatical errors and typographical errors may have occurred due to inherent limitation of voice recognition software.  Reasonable attempts made to avoid errors, but this document may contain an error not identified before  finalizing.  Please contact me for any clarification needed.        Dom Price MD      CC:     Isac Kruse  [id number] [id number]

## 2024-08-26 NOTE — LETTER
"8/26/2024      Mark Milton Traaseth  [address] [address]      Dear Colleague,    Thank you for referring your patient, Mark Milton Traaseth, to the [facility]. Please see a copy of my visit note below.    NEUROSURGERY FOLLOWUP  NOTE    Mark Milton Traaseth comes today in f/u following hospital admission at the [facility] from 8/9/2024 to 8/11/2020 for when he presented with left lower extremity proximal primary quadriceps weakness.  Patient is s/p 7/10/2020 for left L3-4 lateral recess decompression and microlumbar discectomy    Patient is in the clinic today with his spouse with EMG/NCV studies.    Patient reports continuing left proximal leg weakness with no improvement since the hospitalization.  However he reports minimal pain in the distribution of L4 dermatome.  He reports tingling around the knee and radiating proximal up to mid thigh and distally up to the ankle.  He denies weakness involving ankle.  Patient is able to ambulate with a walker.  He denies any bladder or bowel symptoms.    8/22/2024: EMG/NCV studies:  Interpretation: Abnormal study: There is electrodiagnostic evidence of:     1.  Electrodiagnostic findings are consistent with a left L4 radiculopathy, active with some signs of reinnervation.  There is a somewhat patchy distribution of denervation with normal needle EMG of the femoral nerve innervated muscles.  I cannot completely exclude a lumbar plexopathy such as diabetic amyotrophy, but clinical correlation is recommended.     2.  Electrodiagnostic findings are consistent with but not confirmatory for a severe sensorimotor peripheral polyneuropathy, predominantly axonal which would be consistent with his history of diabetes mellitus.      PHYSICAL EXAM:   Constitutional: BP (!) 151/72   Pulse 65   Ht 6' 2\" (1.88 m)   Wt 293 lb (132.9 kg)   SpO2 97%   BMI 37.62 kg/m       Mental Status: A & O in no acute distress.  Affect is " appropriate.  Speech is fluent.  Recent and remote memory are intact.  Attention span and concentration are normal.     Motor: No pronator drift of upper extremity.   Normal bulk and tone all muscle groups of upper and lower extremities.       Delt Bi Tri Hand Flex/  Ext Iliopsoas Quadriceps Tibialis Anterior EHL Gastroc     C5 C6 C7 C8/T1 L2 L3 L4 L5 S1   R 5 5 5 5 5 5 5 5 5   L 5 5 5 5 -4 3 5 5 5        Sensory: Sensation decreased to touch along the medial aspect of the leg.     Coordination: Patient ambulates with a walker.  Impaired tandem gait.     Reflexes; supinator, biceps, triceps, knee/ ankle jerk intact.  No hoffmans/   no babinski/ clonus.    Incision: Healed well    IMAGING:   I personally reviewed all radiographic images and agree with the radiology report of left L3-4 hemilaminotomy with lateral recess decompression postoperative changes.      8/9/2024 MRI lumbar spine:  IMPRESSION:   1. Left laminectomy changes at L3-4.   2.  T1 hypointense enhancing tissue within the left lateral recess at L3-4 encasing the traversing left L4 nerve root, presumably postoperative changes, although superimposed extruded disc material/inflammatory change cannot be excluded. Infection not excluded.   3.  Right laminectomy changes at L4-5 and L5-S1.   4.  Other degenerative change and stenoses as detailed.      CONSULTATION ASSESSMENT AND PLAN:    Mark Milton Traaseth comes today in f/u following hospital admission at the [facility] from 8/9/2024 to 8/11/2020 for when he presented with left lower extremity proximal primary quadriceps weakness. Patient is s/p 7/10/2020 for left L3-4 lateral recess decompression and microlumbar discectomy.     Patient is in the clinic today with his spouse.  He reports no significant change in his left proximal leg weakness since the discharge.  He denies any new onset neurological symptoms during his clinic today since the discharge.  He is scheduled to start physical therapy  this week.    I discussed the EMG/NCV findings of left L4 radiculopathy with signs of reinnervation which could be postoperative following decompression.  I also explained to him that diabetic amyotrophy and lumbar plexopathy cannot be ruled out.  He also has severe sensorimotor peripheral polyneuropathy predominantly axonal given his history of diabetes mellitus (last Hb1Ac: 7.1%).    I also explained the postoperative MRI lumbar spine done on 8/9/2024 and showed him the images.  I explained to him that there is satisfactory decompression at L3-4 compared to his preoperative MRI.    I also explained to him since patient reports improvement in pain following the decompression with ongoing weakness I am not sure that repeat decompression is likely to help him with his symptoms.    I also discussed the role of repeat decompression and decompression with fusion.  However since he has degenerative disc disease at lumbar 4 lumbar 5 and lumbar 5 sacral 1, he is likely to need multilevel fusion.Patient is also not willing for any surgical intervention at this point.  He would like to try physical therapy prior to any intervention.  He is scheduled to start physical therapy this week.    I will also refer him to the spine medicine team and will also discuss his case in the spine case conference this week.  I will also refer him to the neurologist for their opinion regarding peripheral neuropathy.  I will follow him in 6 weeks or so to reassess his clinical progress and further management.    Patient agreed with the plan.  All the questions were answered and patient sounded understanding.  He can contact us if there are any further questions or concerns or worsening neurological deficits.I spent more than 20 minutes in this apt, examining the pt, reviewing the scans, reviewing notes from chart, discussing treatment options with risks and benefits and coordinating care. This note was created in part by the use of Dragon voice  recognition system. Inadvertent grammatical errors and typographical errors may have occurred due to inherent limitation of voice recognition software.  Reasonable attempts made to avoid errors, but this document may contain an error not identified before finalizing.  Please contact me for any clarification needed.        Dom Price MD      CC:     Isac Kruse  [address] [facility] [id number]      Again, thank you for allowing me to participate in the care of your patient.        Sincerely,        Dom Price MD

## 2024-09-22 ENCOUNTER — HEALTH MAINTENANCE LETTER (OUTPATIENT)
Age: 59
End: 2024-09-22

## 2025-01-12 ENCOUNTER — HEALTH MAINTENANCE LETTER (OUTPATIENT)
Age: 60
End: 2025-01-12

## 2025-01-15 PROBLEM — N18.2 STAGE 2 CHRONIC KIDNEY DISEASE: Status: ACTIVE | Noted: 2020-08-16

## 2025-01-15 PROBLEM — E11.42 DIABETIC SENSORIMOTOR POLYNEUROPATHY (H): Status: ACTIVE | Noted: 2025-01-15

## 2025-01-15 PROBLEM — E11.311: Status: ACTIVE | Noted: 2018-08-13

## 2025-01-15 PROBLEM — M54.59 INTRACTABLE LOW BACK PAIN: Status: RESOLVED | Noted: 2024-05-25 | Resolved: 2025-01-15

## 2025-01-15 PROBLEM — E03.9 HYPOTHYROIDISM: Status: ACTIVE | Noted: 2020-08-16

## 2025-01-15 PROBLEM — Z89.431 S/P TRANSMETATARSAL AMPUTATION OF FOOT, RIGHT (H): Status: ACTIVE | Noted: 2021-09-08

## 2025-01-15 PROBLEM — G47.33 OBSTRUCTIVE SLEEP APNEA SYNDROME: Status: ACTIVE | Noted: 2018-08-13

## 2025-01-15 PROBLEM — I25.10 ARTERIOSCLEROSIS OF CORONARY ARTERY: Status: ACTIVE | Noted: 2020-08-16

## 2025-04-26 ENCOUNTER — HEALTH MAINTENANCE LETTER (OUTPATIENT)
Age: 60
End: 2025-04-26

## 2025-08-09 ENCOUNTER — HEALTH MAINTENANCE LETTER (OUTPATIENT)
Age: 60
End: 2025-08-09

## (undated) DEVICE — SU VICRYL 2-0 CT-2 CR 8X18" J726D

## (undated) DEVICE — DRSG KERLIX FLUFFS X5

## (undated) DEVICE — MANIFOLD NEPTUNE 4 PORT 700-20

## (undated) DEVICE — SPONGE COTTONOID 1/2X3" 80-1407

## (undated) DEVICE — PREP CHLORAPREP 26ML TINTED HI-LITE ORANGE 930815

## (undated) DEVICE — NDL SPINAL 18GA 3.5" 405184

## (undated) DEVICE — PACK UNIVERSAL SPLIT 29131

## (undated) DEVICE — LINEN TOWEL PACK X5 5464

## (undated) DEVICE — DRAPE SHEET REV FOLD 3/4 9349

## (undated) DEVICE — ESU GROUND PAD UNIVERSAL W/O CORD

## (undated) DEVICE — SYR BULB IRRIG DOVER 60 ML LATEX FREE 67000

## (undated) DEVICE — RX SURGIFLO HEMOSTATIC MATRIX 10ML 199102S

## (undated) DEVICE — DRSG GAUZE 4X4" 3033

## (undated) DEVICE — SURGICEL HEMOSTAT 2X3" 1953

## (undated) DEVICE — ESU PENCIL W/HOLSTER E2350H

## (undated) DEVICE — POSITIONER PT PRONESAFE HEAD REST W/DERMAPROX INSERT 40599

## (undated) DEVICE — GLOVE BIOGEL PI MICRO SZ 7.0 48570

## (undated) DEVICE — SU MONOCRYL 3-0 SH 27" UND Y416H

## (undated) DEVICE — DRAPE MAYO STAND 23X54 8337

## (undated) DEVICE — SUCTION MANIFOLD NEPTUNE SGL

## (undated) DEVICE — DRAPE MICROSCOPE LEICA 54X150" AR8033650

## (undated) DEVICE — GLOVE BIOGEL PI MICRO SZ 7.5 48575

## (undated) DEVICE — TOOL DISSECT MIDAS MR8 12CM TELESC MATCH 2.5 MR8-T12MH25

## (undated) DEVICE — SOL WATER IRRIG 1000ML BOTTLE 2F7114

## (undated) DEVICE — TAPE MEDIPORE 6"X10YD 2966

## (undated) DEVICE — SPONGE SURGIFOAM 50

## (undated) DEVICE — PACK SPINE SM CUSTOM SNE15SSFSK

## (undated) DEVICE — GLOVE BIOGEL PI MICRO INDICATOR UNDERGLOVE SZ 7.5 48975

## (undated) RX ORDER — FENTANYL CITRATE 50 UG/ML
INJECTION, SOLUTION INTRAMUSCULAR; INTRAVENOUS
Status: DISPENSED
Start: 2024-07-10

## (undated) RX ORDER — EPINEPHRINE 1 MG/ML
INJECTION, SOLUTION INTRAMUSCULAR; SUBCUTANEOUS
Status: DISPENSED
Start: 2024-07-10

## (undated) RX ORDER — DEXAMETHASONE SODIUM PHOSPHATE 10 MG/ML
INJECTION, SOLUTION INTRAMUSCULAR; INTRAVENOUS
Status: DISPENSED
Start: 2024-05-28

## (undated) RX ORDER — ONDANSETRON 2 MG/ML
INJECTION INTRAMUSCULAR; INTRAVENOUS
Status: DISPENSED
Start: 2019-04-25

## (undated) RX ORDER — FENTANYL CITRATE 0.05 MG/ML
INJECTION, SOLUTION INTRAMUSCULAR; INTRAVENOUS
Status: DISPENSED
Start: 2024-07-10

## (undated) RX ORDER — ONDANSETRON 2 MG/ML
INJECTION INTRAMUSCULAR; INTRAVENOUS
Status: DISPENSED
Start: 2024-07-10

## (undated) RX ORDER — BUPIVACAINE HYDROCHLORIDE 5 MG/ML
INJECTION, SOLUTION EPIDURAL; INTRACAUDAL
Status: DISPENSED
Start: 2024-07-10

## (undated) RX ORDER — HYDRALAZINE HYDROCHLORIDE 20 MG/ML
INJECTION INTRAMUSCULAR; INTRAVENOUS
Status: DISPENSED
Start: 2024-07-10

## (undated) RX ORDER — GABAPENTIN 300 MG/1
CAPSULE ORAL
Status: DISPENSED
Start: 2024-07-10

## (undated) RX ORDER — LIDOCAINE HYDROCHLORIDE 10 MG/ML
INJECTION, SOLUTION EPIDURAL; INFILTRATION; INTRACAUDAL; PERINEURAL
Status: DISPENSED
Start: 2024-05-28

## (undated) RX ORDER — PROPOFOL 10 MG/ML
INJECTION, EMULSION INTRAVENOUS
Status: DISPENSED
Start: 2024-07-10

## (undated) RX ORDER — ACETAMINOPHEN 325 MG/1
TABLET ORAL
Status: DISPENSED
Start: 2024-07-10